# Patient Record
Sex: MALE | Race: WHITE | Employment: OTHER | URBAN - METROPOLITAN AREA
[De-identification: names, ages, dates, MRNs, and addresses within clinical notes are randomized per-mention and may not be internally consistent; named-entity substitution may affect disease eponyms.]

---

## 2017-06-28 RX ORDER — SIMVASTATIN 40 MG
TABLET ORAL
Qty: 90 TABLET | Refills: 3 | Status: SHIPPED | OUTPATIENT
Start: 2017-06-28 | End: 2018-07-03 | Stop reason: SDUPTHER

## 2017-08-11 ENCOUNTER — OFFICE VISIT (OUTPATIENT)
Dept: CARDIOLOGY CLINIC | Age: 47
End: 2017-08-11

## 2017-08-11 VITALS
DIASTOLIC BLOOD PRESSURE: 80 MMHG | HEART RATE: 70 BPM | BODY MASS INDEX: 20.05 KG/M2 | HEIGHT: 72 IN | OXYGEN SATURATION: 98 % | SYSTOLIC BLOOD PRESSURE: 130 MMHG | WEIGHT: 148 LBS

## 2017-08-11 DIAGNOSIS — E78.2 MIXED HYPERLIPIDEMIA: ICD-10-CM

## 2017-08-11 DIAGNOSIS — I73.9 PAD (PERIPHERAL ARTERY DISEASE) (HCC): ICD-10-CM

## 2017-08-11 DIAGNOSIS — I10 ESSENTIAL HYPERTENSION: ICD-10-CM

## 2017-08-11 DIAGNOSIS — I25.10 CORONARY ARTERY DISEASE INVOLVING NATIVE CORONARY ARTERY OF NATIVE HEART WITHOUT ANGINA PECTORIS: Primary | ICD-10-CM

## 2017-08-11 PROCEDURE — 99214 OFFICE O/P EST MOD 30 MIN: CPT | Performed by: INTERNAL MEDICINE

## 2017-08-11 PROCEDURE — 93000 ELECTROCARDIOGRAM COMPLETE: CPT | Performed by: INTERNAL MEDICINE

## 2017-08-11 RX ORDER — LISINOPRIL 10 MG/1
10 TABLET ORAL DAILY
Qty: 30 TABLET | Refills: 6
Start: 2017-08-11 | End: 2019-04-22

## 2017-08-11 RX ORDER — METOCLOPRAMIDE 5 MG/1
5 TABLET ORAL PRN
COMMUNITY
End: 2019-11-15 | Stop reason: CLARIF

## 2018-07-03 RX ORDER — SIMVASTATIN 40 MG
TABLET ORAL
Qty: 90 TABLET | Refills: 3 | Status: SHIPPED | OUTPATIENT
Start: 2018-07-03 | End: 2019-04-22 | Stop reason: SDUPTHER

## 2018-07-16 ENCOUNTER — OFFICE VISIT (OUTPATIENT)
Dept: CARDIOLOGY CLINIC | Age: 48
End: 2018-07-16

## 2018-07-16 VITALS
WEIGHT: 159 LBS | OXYGEN SATURATION: 94 % | HEIGHT: 72 IN | BODY MASS INDEX: 21.54 KG/M2 | DIASTOLIC BLOOD PRESSURE: 82 MMHG | SYSTOLIC BLOOD PRESSURE: 174 MMHG | HEART RATE: 84 BPM

## 2018-07-16 DIAGNOSIS — E78.2 MIXED HYPERLIPIDEMIA: ICD-10-CM

## 2018-07-16 DIAGNOSIS — I10 ESSENTIAL HYPERTENSION: ICD-10-CM

## 2018-07-16 DIAGNOSIS — I73.9 PERIPHERAL VASCULAR DISEASE (HCC): ICD-10-CM

## 2018-07-16 DIAGNOSIS — I25.10 CORONARY ARTERY DISEASE INVOLVING NATIVE CORONARY ARTERY OF NATIVE HEART WITHOUT ANGINA PECTORIS: Primary | ICD-10-CM

## 2018-07-16 PROCEDURE — 99215 OFFICE O/P EST HI 40 MIN: CPT | Performed by: INTERNAL MEDICINE

## 2018-07-16 RX ORDER — AMLODIPINE BESYLATE 5 MG/1
5 TABLET ORAL DAILY
Qty: 30 TABLET | Refills: 6 | Status: SHIPPED | OUTPATIENT
Start: 2018-07-16 | End: 2019-01-30 | Stop reason: SDUPTHER

## 2018-07-16 RX ORDER — NITROGLYCERIN 0.4 MG/1
0.4 TABLET SUBLINGUAL EVERY 5 MIN PRN
Qty: 25 TABLET | Refills: 3 | Status: SHIPPED | OUTPATIENT
Start: 2018-07-16 | End: 2019-04-22 | Stop reason: SDUPTHER

## 2018-07-16 RX ORDER — CLOPIDOGREL BISULFATE 75 MG/1
75 TABLET ORAL DAILY
Qty: 30 TABLET | Refills: 6 | Status: SHIPPED | OUTPATIENT
Start: 2018-07-16 | End: 2019-01-30 | Stop reason: SDUPTHER

## 2018-07-16 ASSESSMENT — ENCOUNTER SYMPTOMS
ABDOMINAL DISTENTION: 0
WHEEZING: 0
COUGH: 0
EYE REDNESS: 0
BLOOD IN STOOL: 0
SHORTNESS OF BREATH: 0

## 2018-07-16 NOTE — PROGRESS NOTES
Ameena Garza is a 57 y.o. male    Reason for Visit: f/u CAD  CC: \"I am having foot and leg pain\"    HPI  Ameena Garza states that he is having left foot and distal left leg pain and swelling. He is only able to walk about 100 feet due to the pain. He also has left leg pain at night when sleeping with his leg elevated. Dangling his foot over the side of the bed improves his pain. His BP has also been elevated due to the pain. He denies chest pain, palpitations, dizziness, syncope, and increased dyspnea. Review of Systems   Constitutional: Negative for activity change, appetite change, chills, fatigue, fever and unexpected weight change. HENT: Negative for congestion, nosebleeds and tinnitus. Eyes: Negative for redness and visual disturbance. Respiratory: Negative for cough, shortness of breath and wheezing. Cardiovascular: Positive for leg swelling. Negative for chest pain and palpitations. Gastrointestinal: Negative for abdominal distention and blood in stool. Genitourinary: Negative for dysuria and hematuria. Musculoskeletal: Negative for gait problem and myalgias. Leg pain   Neurological: Negative for dizziness and speech difficulty. Hematological: Does not bruise/bleed easily. Psychiatric/Behavioral: Negative for behavioral problems and confusion. All other systems reviewed and are negative. Physical Exam   Constitutional: He is oriented to person, place, and time. He appears well-developed and well-nourished. HENT:   Head: Normocephalic and atraumatic. Eyes: Conjunctivae are normal. Right eye exhibits no discharge. Left eye exhibits no discharge. Neck: Normal range of motion. Neck supple. Cardiovascular: Normal rate, regular rhythm, normal heart sounds and intact distal pulses. Pulmonary/Chest: Effort normal and breath sounds normal.   Abdominal: Soft. Bowel sounds are normal.   Musculoskeletal: Normal range of motion. He exhibits no edema.    LLE cool to touch   Neurological: He is alert and oriented to person, place, and time. Skin: Skin is warm and dry. Psychiatric: He has a normal mood and affect. His behavior is normal.   Nursing note and vitals reviewed. Wt Readings from Last 3 Encounters:   07/16/18 159 lb (72.1 kg)   08/11/17 148 lb (67.1 kg)   06/10/16 156 lb 12.8 oz (71.1 kg)     BP Readings from Last 3 Encounters:   07/16/18 (!) 174/82   08/11/17 130/80   06/10/16 130/82     Pulse Readings from Last 3 Encounters:   07/16/18 84   08/11/17 70   06/10/16 75         Current Outpatient Prescriptions:     simvastatin (ZOCOR) 40 MG tablet, TAKE 1 TABLET NIGHTLY, Disp: 90 tablet, Rfl: 3    metoclopramide (REGLAN) 5 MG tablet, Take 5 mg by mouth as needed, Disp: , Rfl:     lisinopril (PRINIVIL;ZESTRIL) 10 MG tablet, Take 1 tablet by mouth daily (Patient taking differently: Take 10 mg by mouth as needed ), Disp: 30 tablet, Rfl: 6    methocarbamol (ROBAXIN) 750 MG tablet, Take 750 mg by mouth as needed, Disp: , Rfl:     topiramate (TOPAMAX) 50 MG tablet, Take 50 mg by mouth as needed, Disp: , Rfl:     metoprolol (LOPRESSOR) 25 MG tablet, Take 1 tablet by mouth 2 times daily, Disp: 180 tablet, Rfl: 3    buPROPion (WELLBUTRIN XL) 300 MG XL tablet, Take 300 mg by mouth every morning, Disp: , Rfl:     traZODone (DESYREL) 100 MG tablet, Take 100 mg by mouth as needed. , Disp: , Rfl:     aspirin 325 MG tablet, Take 325 mg by mouth daily. , Disp: , Rfl:     Past Medical History:   Diagnosis Date    Acute MI     inferior wall    Alcohol abuse     history     [facility]     CAD (coronary artery disease)     S/P IWMIi 4/10 w/ RCA FIONA X3, normal LVEF. Cath 11/10 nonobstructive LAD dz, RCA stent with moderate instent restenosis.  s/p OM fiona X2 12/10    Closed fracture of cervical spine ([facility]     2000; C2-3; fell off ladder    Depression     not on meds    GERD (gastroesophageal reflux disease)     managed by PCP    Hyperlipidemia     rec semi annual lipids with LDL goal <70    Hypertension     controlled    Nausea & vomiting     with anesthesia    PAD (peripheral artery disease) (HCC)     Aortogram 90% SFA followed by Dr. Saige Kunz user     cessation discussed; past 25 years; 0.5 ppd       Social History     Social History    Marital status:      Spouse name: N/A    Number of children: N/A    Years of education: N/A     Social History Main Topics    Smoking status: Former Smoker     Packs/day: 0.25     Years: 25.00     Quit date: 3/5/2014    Smokeless tobacco: Never Used      Comment: smokes a couple times a week    Alcohol use 21.0 oz/week     35 Cans of beer per week      Comment: 3 - 5 beers per day @ least.    Drug use: No    Sexual activity: Yes     Partners: Female     Other Topics Concern    None     Social History Narrative    None       Past Surgical History:   Procedure Laterality Date    CORONARY ANGIOPLASTY WITH STENT PLACEMENT  jan 2011    stent x 1    CORONARY ANGIOPLASTY WITH STENT PLACEMENT      IR FEMORAL POPLITEAL BYPASS GRAFT  7/21/11    Left    LEG SURGERY      stent  x 2; vascular dz       Family History   Problem Relation Age of Onset    Heart Disease Mother     Diabetes Father     High Blood Pressure Father     High Blood Pressure Brother     High Blood Pressure Paternal Uncle     Heart Disease Paternal Uncle     Stroke Paternal Uncle     Diabetes Paternal Uncle        No Known Allergies    Recent Labs and Imaging reviewed    Assessment        CAD (coronary artery disease)      S/P IWMI 4/10 w/ RCA SISSY X3, normal LVEF. Cath 11/10 nonobstructive LAD dz, RCA stent with moderate instent restenosis. s/p OM SISSY X2 12/10. Cardiac cath 3/2014 : revealed severe left main trunk stenosis sent immediately for CABGx3 on  3/11/2014 LIMA-LAD, SVG-OM, SVG-PDA    HYPERTENSION      uncontrolled. Taking ACE prn only due to hyperkalemia.      Hyperlipidemia       On statin, unable to tolerate high

## 2018-07-16 NOTE — LETTER
for management of PAD. Ongoing risk factor modification also discussed. Fasting lipid profile, CMP soon and prior to next visit. If you have questions, please do not hesitate to call me. I look forward to following Yani Bejarano along with you.     Sincerely,        Emiliana Levi MD

## 2018-07-19 NOTE — COMMUNICATION BODY
Assessment:       Plan:     HPI  Edwin Hamm states that he is having left foot and distal left leg pain and swelling. He is only able to walk about 100 feet due to the pain. He also has left leg pain at night when sleeping with his leg elevated. Dangling his foot over the side of the bed improves his pain. His BP has also been elevated due to the pain. He denies chest pain, palpitations, dizziness, syncope, and increased dyspnea. Assessment        CAD (coronary artery disease)      S/P IWMI 4/10 w/ RCA SISSY X3, normal LVEF. Cath 11/10 nonobstructive LAD dz, RCA stent with moderate instent restenosis. s/p OM SISSY X2 12/10. Cardiac cath 3/2014 : revealed severe left main trunk stenosis sent immediately for CABGx3 on  3/11/2014 LIMA-LAD, SVG-OM, SVG-PDA    HYPERTENSION      uncontrolled. Taking ACE prn only due to hyperkalemia.  Hyperlipidemia       On statin, unable to tolerate high intensity statin. LFT's elevated in past. No recent lipids.  PAD (peripheral artery disease) [place] dz      Aortogram 90% L SFA s/p stent. s/p vascular surgery left leg 7/11 Dr. Ddoie Bui     ETOH abuse in past       avoidance discussed    Tobacco user      Encouraged continued cessation discussed - quit smoking 2014     GERD (gastroesophageal reflux disease)      managed by PCP     Plan   No evidence of CHF. No angina. In view of uncontrolled HTN, will start norvasc 5mg daily. Continue ASA, statin and BB. In view of PAD, will start plavix 75mg daily. Recommend arterial doppler studies of lower extremities to evaluate claudication/progression of disease. Will refer to Dr. Ambika Short for management of PAD. Ongoing risk factor modification also discussed. Fasting lipid profile, CMP soon and prior to next visit.

## 2018-07-20 ENCOUNTER — OFFICE VISIT (OUTPATIENT)
Dept: SURGERY | Age: 48
End: 2018-07-20

## 2018-07-20 VITALS
HEIGHT: 72 IN | SYSTOLIC BLOOD PRESSURE: 164 MMHG | DIASTOLIC BLOOD PRESSURE: 80 MMHG | WEIGHT: 159 LBS | BODY MASS INDEX: 21.54 KG/M2

## 2018-07-20 DIAGNOSIS — I25.10 CORONARY ARTERY DISEASE INVOLVING NATIVE CORONARY ARTERY OF NATIVE HEART WITHOUT ANGINA PECTORIS: ICD-10-CM

## 2018-07-20 DIAGNOSIS — E78.2 MIXED HYPERLIPIDEMIA: ICD-10-CM

## 2018-07-20 DIAGNOSIS — I77.9 PAOD (PERIPHERAL ARTERIAL OCCLUSIVE DISEASE) (HCC): ICD-10-CM

## 2018-07-20 DIAGNOSIS — I10 ESSENTIAL HYPERTENSION: ICD-10-CM

## 2018-07-20 DIAGNOSIS — T82.868A THROMBOSIS OF FEMORO-POPLITEAL BYPASS GRAFT, INITIAL ENCOUNTER (HCC): Primary | ICD-10-CM

## 2018-07-20 DIAGNOSIS — I70.322 ATHEROSCLEROSIS OF BYPASS GRAFT OF LEFT LOWER EXTREMITY WITH REST PAIN (HCC): ICD-10-CM

## 2018-07-20 PROCEDURE — 99205 OFFICE O/P NEW HI 60 MIN: CPT | Performed by: SURGERY

## 2018-07-20 RX ORDER — OXYCODONE HYDROCHLORIDE AND ACETAMINOPHEN 5; 325 MG/1; MG/1
1 TABLET ORAL EVERY 6 HOURS PRN
Qty: 28 TABLET | Refills: 0 | Status: SHIPPED | OUTPATIENT
Start: 2018-07-20 | End: 2018-07-31 | Stop reason: SDUPTHER

## 2018-07-20 ASSESSMENT — ENCOUNTER SYMPTOMS
CHEST TIGHTNESS: 0
SHORTNESS OF BREATH: 0
ABDOMINAL PAIN: 0

## 2018-07-20 NOTE — Clinical Note
I saw Colby Allen today. He has an acutely ischemic left foot with thrombosis of a bypass graft that was constructed in 2011. Arrangements are being made for him to undergo an angiogram and if possible endovascular correction of the blood flow issues to the left leg and foot.   Will keep you updated Mariangel Abreu

## 2018-07-20 NOTE — PATIENT INSTRUCTIONS
The pain and numbness of the left leg and foot is from arterial bypass clotting and blocked arteries. Keep left foot dependent and down  Keep it wrapped in blanket or sock to keep it warm  Do not worry about the swelling in the foot and ankle  You will be scheduled for an angiogram at [facility] on Tuesday 7/24/18.   Stand by for notification of when to show up

## 2018-07-23 ENCOUNTER — TELEPHONE (OUTPATIENT)
Dept: SURGERY | Age: 48
End: 2018-07-23

## 2018-07-26 PROCEDURE — APPNB15 APP NON BILLABLE TIME 0-15 MINS: Performed by: NURSE PRACTITIONER

## 2018-07-27 ENCOUNTER — HOSPITAL ENCOUNTER (OUTPATIENT)
Dept: CARDIAC CATH/INVASIVE PROCEDURES | Age: 48
Discharge: HOME | End: 2018-07-27
Attending: SURGERY | Admitting: SURGERY

## 2018-07-27 VITALS — WEIGHT: 157.19 LBS | HEIGHT: 72 IN | BODY MASS INDEX: 21.29 KG/M2

## 2018-07-27 DIAGNOSIS — I73.9 PERIPHERAL VASCULAR DISEASE (HCC): Primary | ICD-10-CM

## 2018-07-27 LAB
ANION GAP SERPL CALCULATED.3IONS-SCNC: 12 MMOL/L (ref 3–16)
BUN BLDV-MCNC: 11 MG/DL (ref 7–20)
CALCIUM SERPL-MCNC: 9.2 MG/DL (ref 8.3–10.6)
CHLORIDE BLD-SCNC: 91 MMOL/L (ref 99–110)
CO2: 24 MMOL/L (ref 21–32)
CREAT SERPL-MCNC: 1 MG/DL (ref 0.9–1.3)
GFR AFRICAN AMERICAN: >60
GFR NON-AFRICAN AMERICAN: >60
GLUCOSE BLD-MCNC: 93 MG/DL (ref 70–99)
HCT VFR BLD CALC: 40.1 % (ref 40.5–52.5)
HEMOGLOBIN: 14 G/DL (ref 13.5–17.5)
MCH RBC QN AUTO: 34.8 PG (ref 26–34)
MCHC RBC AUTO-ENTMCNC: 35 G/DL (ref 31–36)
MCV RBC AUTO: 99.6 FL (ref 80–100)
PDW BLD-RTO: 13 % (ref 12.4–15.4)
PLATELET # BLD: 214 K/UL (ref 135–450)
PMV BLD AUTO: 6.4 FL (ref 5–10.5)
POTASSIUM SERPL-SCNC: 4.2 MMOL/L (ref 3.5–5.1)
RBC # BLD: 4.03 M/UL (ref 4.2–5.9)
SODIUM BLD-SCNC: 127 MMOL/L (ref 136–145)
WBC # BLD: 7.2 K/UL (ref 4–11)

## 2018-07-27 PROCEDURE — 75774 ARTERY X-RAY EACH VESSEL: CPT | Performed by: SURGERY

## 2018-07-27 PROCEDURE — 76937 US GUIDE VASCULAR ACCESS: CPT | Performed by: SURGERY

## 2018-07-27 PROCEDURE — 37221 PR REVSC OPN/PRQ ILIAC ART W/STNT PLMT & ANGIOPLSTY: CPT | Performed by: SURGERY

## 2018-07-27 PROCEDURE — 75625 CONTRAST EXAM ABDOMINL AORTA: CPT | Performed by: SURGERY

## 2018-07-27 PROCEDURE — 37184 PRIM ART M-THRMBC 1ST VSL: CPT | Performed by: SURGERY

## 2018-07-27 PROCEDURE — 75716 ARTERY X-RAYS ARMS/LEGS: CPT | Performed by: SURGERY

## 2018-07-27 PROCEDURE — 37227 PR REVSC OPN/PRQ FEM/POP W/STNT/ATHRC/ANGIOP SM VSL: CPT | Performed by: SURGERY

## 2018-07-27 PROCEDURE — 37228 PR REVSC OPN/PRQ TIB/PERO W/ANGIOPLASTY UNI: CPT | Performed by: SURGERY

## 2018-07-27 RX ORDER — 0.9 % SODIUM CHLORIDE 0.9 %
500 INTRAVENOUS SOLUTION INTRAVENOUS PRN
Status: DISCONTINUED | OUTPATIENT
Start: 2018-07-27 | End: 2018-07-28 | Stop reason: HOSPADM

## 2018-07-27 RX ORDER — ACETAMINOPHEN 325 MG/1
650 TABLET ORAL EVERY 4 HOURS PRN
Status: DISCONTINUED | OUTPATIENT
Start: 2018-07-27 | End: 2018-07-28 | Stop reason: HOSPADM

## 2018-07-27 RX ORDER — FENTANYL CITRATE 50 UG/ML
25 INJECTION, SOLUTION INTRAMUSCULAR; INTRAVENOUS
Status: ACTIVE | OUTPATIENT
Start: 2018-07-27 | End: 2018-07-27

## 2018-07-27 RX ORDER — SODIUM CHLORIDE 9 MG/ML
INJECTION, SOLUTION INTRAVENOUS CONTINUOUS
Status: DISCONTINUED | OUTPATIENT
Start: 2018-07-27 | End: 2018-07-28 | Stop reason: HOSPADM

## 2018-07-27 RX ORDER — SODIUM CHLORIDE 0.9 % (FLUSH) 0.9 %
10 SYRINGE (ML) INJECTION PRN
Status: DISCONTINUED | OUTPATIENT
Start: 2018-07-27 | End: 2018-07-27 | Stop reason: ALTCHOICE

## 2018-07-27 RX ORDER — SODIUM CHLORIDE 0.9 % (FLUSH) 0.9 %
10 SYRINGE (ML) INJECTION EVERY 12 HOURS SCHEDULED
Status: DISCONTINUED | OUTPATIENT
Start: 2018-07-27 | End: 2018-07-27 | Stop reason: ALTCHOICE

## 2018-07-27 RX ORDER — SODIUM CHLORIDE 9 MG/ML
INJECTION, SOLUTION INTRAVENOUS CONTINUOUS
Status: DISCONTINUED | OUTPATIENT
Start: 2018-07-27 | End: 2018-07-27 | Stop reason: ALTCHOICE

## 2018-07-27 RX ORDER — ALPRAZOLAM 0.25 MG/1
0.5 TABLET ORAL
Status: DISCONTINUED | OUTPATIENT
Start: 2018-07-27 | End: 2018-07-27 | Stop reason: ALTCHOICE

## 2018-07-27 RX ORDER — ONDANSETRON 2 MG/ML
4 INJECTION INTRAMUSCULAR; INTRAVENOUS EVERY 6 HOURS PRN
Status: DISCONTINUED | OUTPATIENT
Start: 2018-07-27 | End: 2018-07-28 | Stop reason: HOSPADM

## 2018-07-27 RX ORDER — MORPHINE SULFATE 10 MG/ML
2 INJECTION, SOLUTION INTRAMUSCULAR; INTRAVENOUS
Status: ACTIVE | OUTPATIENT
Start: 2018-07-27 | End: 2018-07-27

## 2018-07-27 NOTE — PROGRESS NOTES
disease      Assessment:     Patient Active Problem List   Diagnosis    CAD (coronary artery disease)    HTN (hypertension)    Hyperlipidemia    Peripheral vascular disease ([facility]    Mechanical complication of other vascular device, implant, and graft    Chest pain    Dyspnea       Arterial Exam Lower Extremity:  pedal pulses decreased or absent bilateral    Location Target Vessel Side: left    Vascular Exam of Planned Intervention: CLI - Ischemic Pain at Rest (Jhonny 4), Pt reported max distance: <200 meters (2 blocks), CLI Rest Pain: Yes, CLI Tissue Loss Location: Forefoot and Toes       Aneurysm below the Aortic Bifurcation: Aneurysm: No    Plan:     Planned procedure Angiogram of the aorta with possible revascularization. Written Patient Dismissal Instructions Given    The patient verbalized understanding of the risks associated with Peripheral Vascular Intervention and has agreed to the above plan.         Electronically signed by Mick Manzo MD on 7/27/2018 at 7:54 AM

## 2018-07-27 NOTE — H&P
Patient ID: Yesenia Card is a 57 y.o. male. Chief Complaint   Patient presents with    New Patient       Pt is here today, referred by Dr Jenelle Davidson, leg swelling.       HPI He has a painful, swelling left leg. In 2011 he had a left leg fem pop bypass graft using vein from the left leg. About one month ago he started having ambulatory discomfort in the left calf and also had radiation of pain into the left shin area. Over the past week the pain is much worse and runs along the left shin and ankle region and into the foot  There is numbness of the left foot but this has been present since the bypass graft in 2011 ( [address] Vikram Le)  He describes typical ischemic rest pain requiring him to hang the left leg over the bed to get some sleep at night. He has been requested to have peripheral vascular studies done and these have not yet been scheduled. He is getting desperate and is seeking for more urgent evaluation  In the office today he is accompanied by his wife, daughter, and a friend. Being treated for hypertension, hyperlipidemia, coronary artery disease  Stopped smoking cigarettes 4 years ago.     Review of Systems   Constitutional: Positive for activity change. Respiratory: Negative for chest tightness and shortness of breath. Cardiovascular: Positive for leg swelling (left foot and ankle). Negative for chest pain. Gastrointestinal: Negative for abdominal pain. Musculoskeletal:        Leg spasms; restless legs   Neurological: Positive for headaches (history of cluster headaches). Psychiatric/Behavioral: Positive for agitation (anxiety).       Objective:   Physical Exam   Constitutional: He appears well-developed and well-nourished. HENT:   Head: Normocephalic. Eyes: Pupils are equal, round, and reactive to light. Neck: Normal range of motion. Neck supple. Carotid bruit is not present. Cardiovascular: Normal rate, regular rhythm and normal heart sounds.     Pulses:       Carotid pulses are 2+ on the right side, and 2+ on the left side. Radial pulses are 2+ on the right side, and 2+ on the left side. Femoral pulses are 1+ on the right side, and 1+ on the left side with bruit. Popliteal pulses are 0 on the right side, and 0 on the left side. Dorsalis pedis pulses are 0 on the right side, and 0 on the left side. Posterior tibial pulses are 0 on the right side, and 0 on the left side. Doppler signals in right DP and PT with waveforms obtained  Very faint monophasic DP doppler signal in left foot  Left PT signal not audible  Swelling and dependent rubor to left foot and ankle  Skin remains intact  Varicose veins at the left ankle region   Pulmonary/Chest: Effort normal and breath sounds normal. No respiratory distress. He has no wheezes. He has no rales. Abdominal: He exhibits no distension and no mass. There is no tenderness. There is no guarding. Musculoskeletal: He exhibits no edema or tenderness. Neurological: No cranial nerve deficit or sensory deficit. Coordination and gait normal.   Skin: Skin is warm. Vitals reviewed.        Assessment:     Diagnosis Orders   1. Thrombosis of femoro-popliteal bypass graft, initial encounter ([facility]  oxyCODONE-acetaminophen (PERCOCET) 5-325 MG per tablet   2. Atherosclerosis of bypass graft of left lower extremity with rest pain (HCC)  oxyCODONE-acetaminophen (PERCOCET) 5-325 MG per tablet   3. PAOD (peripheral arterial occlusive disease) (HCC)      4. Essential hypertension      5. Mixed hyperlipidemia      6. Coronary artery disease involving native coronary artery of native heart without angina pectoris                     Discussed with Dr Sol Lewis about diagnostic and therapeutic angiogram.  Exploring possibility of intervening on Tuesday 7/24/2018  Plan:   The pain and numbness of the left leg and foot is from arterial bypass clotting and blocked arteries.   Keep left foot dependent and down  Keep it wrapped in blanket or sock to keep it warm  Do not worry about the swelling in the foot and ankle  You will be scheduled for an angiogram at [facility] on Tuesday 7/24/18.   Stand by for notification of when to show up  Rx for Percocet #28

## 2018-07-27 NOTE — PRE SEDATION
metoprolol (LOPRESSOR) 25 MG tablet Take 1 tablet by mouth 2 times daily 6/10/16  Yes Anh Pineda MD   buPROPion (WELLBUTRIN XL) 300 MG XL tablet Take 300 mg by mouth every morning   Yes Historical Provider, MD   aspirin 325 MG tablet Take 325 mg by mouth daily. Yes Historical Provider, MD   nitroGLYCERIN (NITROSTAT) 0.4 MG SL tablet Place 1 tablet under the tongue every 5 minutes as needed for Chest pain 7/16/18   Anh Pineda MD   metoclopramide (REGLAN) 5 MG tablet Take 5 mg by mouth as needed    Historical Provider, MD   lisinopril (PRINIVIL;ZESTRIL) 10 MG tablet Take 1 tablet by mouth daily  Patient taking differently: Take 10 mg by mouth as needed  8/11/17   Anh Pineda MD   methocarbamol (ROBAXIN) 750 MG tablet Take 750 mg by mouth as needed    Historical Provider, MD   topiramate (TOPAMAX) 50 MG tablet Take 50 mg by mouth as needed    Historical Provider, MD   traZODone (DESYREL) 100 MG tablet Take 100 mg by mouth as needed. Historical Provider, MD         Pre-Sedation Documentation and Exam:   I have personally completed a history, physical exam & review of systems for this patient (see notes).     Mallampati Airway Assessment:  normal    Prior History of Anesthesia Complications:   none    ASA Classification:  Class 3 - A patient with severe systemic disease that limits activity but is not incapacitating    Sedation/ Anesthesia Plan:   intravenous sedation    Medications Planned:   midazolam (Versed) intravenously and fentanyl intravenously    Patient is an appropriate candidate for plan of sedation: yes    Electronically signed by Russell Fitzpatrick MD on 7/27/2018 at 7:53 AM

## 2018-07-28 NOTE — PROCEDURES
[address] [place] [address]                                  PROCEDURE NOTE    PATIENT NAME: Jarad Renteria                   :        1970  MED REC NO:   [id number]                          ROOM:  ACCOUNT NO:   [de-identified]                          ADMIT DATE: 2018  PROVIDER:     Jorge Caro MD    ANGIOGRAPHY    DATE OF PROCEDURE:  2018    PREPROCEDURE DIAGNOSES:  1. Lower extremity atherosclerosis with rest pain of the left foot. 2.  Peripheral arterial disease. 3.  Arterial occlusion, left lower extremity. POSTPROCEDURE DIAGNOSES:  1. Lower extremity atherosclerosis with rest pain of the left foot. 2.  Peripheral arterial disease. 3.  Arterial occlusion, left lower extremity. PROCEDURES PERFORMED:  1. Ultrasound-guided access to the right common femoral artery. 2.  Abdominal aortogram.  3.  Angiogram of the bilateral lower extremities. 4.  Selective angiography of the left femoral artery bifurcation. 5.  Selective angiography of the left posterior tibial artery. 6.  Stent placement, left external iliac artery. 7.  Primary percutaneous mechanical arterial thrombectomy, left superficial  femoral and popliteal arteries. 8.  Laser atherectomy with stent placement, left superficial femoral and  popliteal arteries. 9.  Angioplasty of the left posterior tibial artery. SURGEON:  Jorge Caro MD    ANESTHESIA:  Local with IV sedation. INDICATIONS:  The patient is a 70-year-old male with severe peripheral  arterial disease and critical limb ischemia with ischemic rest pain of the  left foot. Angiography is indicated to determine if he is a candidate for  revascularization. Endovascular revascularization is indicated to provide  limb preservation and alleviate his symptoms of debilitating peripheral  artery disease.   He is aware of the risks, benefits, and alternatives of  the This was followed by laser atherectomy with a 2-mm  atherectomy device. A balloon angioplasty of the posterior tibial artery was performed with a 3 mm angioplasty balloon. Pre-angioplasty stenosis was 70% and post angioplasty stenosis was 0%. Post-laser atherectomy, plain balloon angioplasty was performed with a 5-mm  angioplasty balloon. A flow-limiting dissection was noted in the proximal  superficial femoral artery and this was stented with a 7 mm x 12 cm Zilver  PTX stent. Residual stenosis of greater than 50% was noted at the P2  segment of the popliteal artery. Balloon angioplasty was performed at this  location and this was followed by stenting of the popliteal artery with a  5.5 mm x 12 cm Supera stent. Completion angiogram revealed zero residual  stenosis. Mild extravasation of the popliteal artery was noted from  localized perforation. This was treated by giving the patient protamine  and prolonged balloon inflation of the popliteal artery. Completion  angiogram revealed minimal extravasation. Manual compression of the  popliteal fossa revealed no swelling and manual compression was used to  gain extra hemostasis. The pedal access sheath and the right groin sheath were removed. [employer]  technique was used to gain hemostasis at the right groin. The patient's  left foot had returned to normal color and had detectable Doppler signals. The Doppler signals on the left were not detectable preoperatively. FINDINGS:    Aorta: The celiac and superior mesenteric arteries are widely patent. The  right renal artery has a 90% ostial stenosis. The left renal artery is  widely patent. The infrarenal abdominal aorta is normal appearing. Right Lower Extremity:  The right common and internal iliac arteries are  patent. The external iliac artery has a 70% stenosis throughout. The  common and deep femoral arteries are patent.   The superficial femoral  artery has calcified 90% stenosis in the distal thigh. There is a stent  present in the mid thigh, which is patent, but has a 50% recurrent  stenosis. There is a stent in the popliteal artery, which is patent, but  with a residual 50% stenosis. There is diffuse tibial artery level  disease. Left Lower Extremity:  The left common and internal iliac arteries are  normal.  The external iliac artery has a 90% stenosis. The common and deep  femoral arteries are patent. The left femoral-popliteal bypass graft is  occluded. The native superficial femoral and popliteal arteries are  occluded. The runoff to the foot is via the posterior tibial and anterior  tibial arteries. IMPRESSION:  Good angiographic result post restoration of patency of the  native left superficial femoral and popliteal arteries.         Cat Francis MD    D: 07/27/2018 10:42:09       T: 07/27/2018 10:44:50     [email or web address]  Job#: [id number]     Doc#: [id number]    CC:

## 2018-07-30 ENCOUNTER — TELEPHONE (OUTPATIENT)
Dept: SURGERY | Age: 48
End: 2018-07-30

## 2018-07-31 ENCOUNTER — TELEPHONE (OUTPATIENT)
Dept: SURGERY | Age: 48
End: 2018-07-31

## 2018-07-31 DIAGNOSIS — I70.322 ATHEROSCLEROSIS OF BYPASS GRAFT OF LEFT LOWER EXTREMITY WITH REST PAIN (HCC): ICD-10-CM

## 2018-07-31 DIAGNOSIS — Z98.62 STATUS POST PERIPHERAL ARTERY ANGIOPLASTY: ICD-10-CM

## 2018-07-31 DIAGNOSIS — G89.18 PAIN FOLLOWING SURGERY OR PROCEDURE: Primary | ICD-10-CM

## 2018-07-31 DIAGNOSIS — T82.868A THROMBOSIS OF FEMORO-POPLITEAL BYPASS GRAFT, INITIAL ENCOUNTER (HCC): ICD-10-CM

## 2018-07-31 RX ORDER — OXYCODONE HYDROCHLORIDE AND ACETAMINOPHEN 5; 325 MG/1; MG/1
1 TABLET ORAL EVERY 6 HOURS PRN
Qty: 28 TABLET | Refills: 0 | Status: SHIPPED | OUTPATIENT
Start: 2018-07-31 | End: 2018-08-07

## 2018-08-13 ENCOUNTER — OFFICE VISIT (OUTPATIENT)
Dept: SURGERY | Age: 48
End: 2018-08-13

## 2018-08-13 VITALS
HEIGHT: 73 IN | RESPIRATION RATE: 16 BRPM | OXYGEN SATURATION: 98 % | BODY MASS INDEX: 20.46 KG/M2 | HEART RATE: 72 BPM | WEIGHT: 154.4 LBS | SYSTOLIC BLOOD PRESSURE: 168 MMHG | DIASTOLIC BLOOD PRESSURE: 84 MMHG

## 2018-08-13 DIAGNOSIS — E78.2 MIXED HYPERLIPIDEMIA: ICD-10-CM

## 2018-08-13 DIAGNOSIS — Z98.62 STATUS POST PERIPHERAL ARTERY ANGIOPLASTY: ICD-10-CM

## 2018-08-13 DIAGNOSIS — I73.9 PERIPHERAL VASCULAR DISEASE (HCC): Primary | ICD-10-CM

## 2018-08-13 DIAGNOSIS — I10 ESSENTIAL HYPERTENSION: ICD-10-CM

## 2018-08-13 DIAGNOSIS — G89.18 PAIN FOLLOWING SURGERY OR PROCEDURE: ICD-10-CM

## 2018-08-13 LAB
POC ACT LR: 338 SEC
POC ACT LR: >400 SEC
POC ACT LR: >400 SEC

## 2018-08-13 PROCEDURE — 99213 OFFICE O/P EST LOW 20 MIN: CPT | Performed by: SURGERY

## 2018-08-13 NOTE — PROGRESS NOTES
Musculoskeletal: He exhibits no edema or tenderness. Left lower leg: He exhibits swelling. Left foot: There is swelling. Neurological: A sensory deficit (hypersensitive from revascularization effect) is present. No cranial nerve deficit. Coordination and gait normal.   Skin: Skin is warm. Vitals reviewed. Assessment:       Diagnosis Orders   1. Peripheral vascular disease ([facility] [address]     2. Status post peripheral artery angioplasty     3. Pain following surgery or procedure     4. Essential hypertension     5. Mixed hyperlipidemia             Plan:      Left leg looks excellent  Swelling and shooting pains will resolve  Try wrapping the left leg with ACE bandage from forefoot to the knee to help to reduce the swelling  Remove wrap at night during sleep  Ok to try skin ointments to reduce discomfort but if they do not work, stop using them   Consider Aspercreme for pain reduction  Follow up visit in 1 month when we can discuss treatment on the right leg arteries.         Dino Samson MD

## 2018-09-17 ENCOUNTER — OFFICE VISIT (OUTPATIENT)
Dept: SURGERY | Age: 48
End: 2018-09-17

## 2018-09-17 VITALS
DIASTOLIC BLOOD PRESSURE: 91 MMHG | WEIGHT: 157 LBS | HEART RATE: 74 BPM | BODY MASS INDEX: 20.71 KG/M2 | SYSTOLIC BLOOD PRESSURE: 172 MMHG

## 2018-09-17 DIAGNOSIS — Z98.62 STATUS POST PERIPHERAL ARTERY ANGIOPLASTY: Primary | ICD-10-CM

## 2018-09-17 DIAGNOSIS — E78.2 MIXED HYPERLIPIDEMIA: ICD-10-CM

## 2018-09-17 DIAGNOSIS — I25.10 CORONARY ARTERY DISEASE INVOLVING NATIVE CORONARY ARTERY OF NATIVE HEART WITHOUT ANGINA PECTORIS: ICD-10-CM

## 2018-09-17 DIAGNOSIS — I10 ESSENTIAL HYPERTENSION: ICD-10-CM

## 2018-09-17 DIAGNOSIS — I73.9 PERIPHERAL VASCULAR DISEASE (HCC): ICD-10-CM

## 2018-09-17 PROCEDURE — 99213 OFFICE O/P EST LOW 20 MIN: CPT | Performed by: SURGERY

## 2018-09-18 ENCOUNTER — TELEPHONE (OUTPATIENT)
Dept: SURGERY | Age: 48
End: 2018-09-18

## 2018-09-18 DIAGNOSIS — I73.9 PAD (PERIPHERAL ARTERY DISEASE) (HCC): Primary | ICD-10-CM

## 2018-09-18 DIAGNOSIS — Z98.62 STATUS POST PERIPHERAL ARTERY ANGIOPLASTY: ICD-10-CM

## 2018-09-18 NOTE — TELEPHONE ENCOUNTER
Called spoke with patient and set up angiogram and advised patient to come have his blood drawn as well before the procedure.  Orders placed in epic

## 2018-09-21 ENCOUNTER — TELEPHONE (OUTPATIENT)
Dept: SURGERY | Age: 48
End: 2018-09-21

## 2018-10-08 PROCEDURE — APPNB30 APP NON BILLABLE TIME 0-30 MINS: Performed by: NURSE PRACTITIONER

## 2018-10-09 ENCOUNTER — HOSPITAL ENCOUNTER (OUTPATIENT)
Dept: CARDIAC CATH/INVASIVE PROCEDURES | Age: 48
Discharge: HOME | End: 2018-10-09
Payer: COMMERCIAL

## 2018-10-09 VITALS — BODY MASS INDEX: 20.93 KG/M2 | WEIGHT: 154.5 LBS | HEIGHT: 72 IN

## 2018-10-09 DIAGNOSIS — I73.9 PAD (PERIPHERAL ARTERY DISEASE) (HCC): Primary | ICD-10-CM

## 2018-10-09 LAB
CALCIUM IONIZED: 1.14 MMOL/L (ref 1.12–1.32)
GFR AFRICAN AMERICAN: >60
GFR NON-AFRICAN AMERICAN: >60
GLUCOSE BLD-MCNC: 105 MG/DL (ref 70–99)
HCT VFR BLD CALC: 41.7 % (ref 40.5–52.5)
HEMOGLOBIN: 14.4 G/DL (ref 13.5–17.5)
MCH RBC QN AUTO: 34.7 PG (ref 26–34)
MCHC RBC AUTO-ENTMCNC: 34.5 G/DL (ref 31–36)
MCV RBC AUTO: 100.4 FL (ref 80–100)
PDW BLD-RTO: 13 % (ref 12.4–15.4)
PERFORMED ON: ABNORMAL
PLATELET # BLD: 193 K/UL (ref 135–450)
PMV BLD AUTO: 6.3 FL (ref 5–10.5)
POC ACT LR: 336 SEC
POC CHLORIDE: 100 MMOL/L (ref 99–110)
POC CREATININE: 1 MG/DL (ref 0.9–1.3)
POC POTASSIUM: 4.3 MMOL/L (ref 3.5–5.1)
POC SAMPLE TYPE: ABNORMAL
POC SODIUM: 136 MMOL/L (ref 136–145)
RBC # BLD: 4.15 M/UL (ref 4.2–5.9)
WBC # BLD: 7.5 K/UL (ref 4–11)

## 2018-10-09 PROCEDURE — 82947 ASSAY GLUCOSE BLOOD QUANT: CPT

## 2018-10-09 PROCEDURE — 2709999900 HC NON-CHARGEABLE SUPPLY

## 2018-10-09 PROCEDURE — C1769 GUIDE WIRE: HCPCS

## 2018-10-09 PROCEDURE — 85027 COMPLETE CBC AUTOMATED: CPT

## 2018-10-09 PROCEDURE — 2780000010 HC IMPLANT OTHER

## 2018-10-09 PROCEDURE — 75710 ARTERY X-RAYS ARM/LEG: CPT | Performed by: SURGERY

## 2018-10-09 PROCEDURE — C1724 CATH, TRANS ATHEREC,ROTATION: HCPCS

## 2018-10-09 PROCEDURE — 37227 HC FEM/POPL REVASC STNT & ATHER: CPT | Performed by: SURGERY

## 2018-10-09 PROCEDURE — 99153 MOD SED SAME PHYS/QHP EA: CPT | Performed by: SURGERY

## 2018-10-09 PROCEDURE — 76937 US GUIDE VASCULAR ACCESS: CPT | Performed by: SURGERY

## 2018-10-09 PROCEDURE — 99152 MOD SED SAME PHYS/QHP 5/>YRS: CPT | Performed by: SURGERY

## 2018-10-09 PROCEDURE — C1887 CATHETER, GUIDING: HCPCS

## 2018-10-09 PROCEDURE — 84132 ASSAY OF SERUM POTASSIUM: CPT

## 2018-10-09 PROCEDURE — 82330 ASSAY OF CALCIUM: CPT

## 2018-10-09 PROCEDURE — C2623 CATH, TRANSLUMIN, DRUG-COAT: HCPCS

## 2018-10-09 PROCEDURE — 37229 HC TIB PER TERRITORY ATHER: CPT | Performed by: SURGERY

## 2018-10-09 PROCEDURE — C1874 STENT, COATED/COV W/DEL SYS: HCPCS

## 2018-10-09 PROCEDURE — 85347 COAGULATION TIME ACTIVATED: CPT

## 2018-10-09 PROCEDURE — 37227 PR REVSC OPN/PRQ FEM/POP W/STNT/ATHRC/ANGIOP SM VSL: CPT | Performed by: SURGERY

## 2018-10-09 PROCEDURE — 75774 ARTERY X-RAY EACH VESSEL: CPT | Performed by: SURGERY

## 2018-10-09 PROCEDURE — 82565 ASSAY OF CREATININE: CPT

## 2018-10-09 PROCEDURE — 37221 PR REVSC OPN/PRQ ILIAC ART W/STNT PLMT & ANGIOPLSTY: CPT | Performed by: SURGERY

## 2018-10-09 PROCEDURE — 37221 HC ILIAC TERRITORY PLASTY STENT: CPT | Performed by: SURGERY

## 2018-10-09 PROCEDURE — 82435 ASSAY OF BLOOD CHLORIDE: CPT

## 2018-10-09 PROCEDURE — 6360000004 HC RX CONTRAST MEDICATION: Performed by: SURGERY

## 2018-10-09 PROCEDURE — C1894 INTRO/SHEATH, NON-LASER: HCPCS

## 2018-10-09 PROCEDURE — 84295 ASSAY OF SERUM SODIUM: CPT

## 2018-10-09 PROCEDURE — C1725 CATH, TRANSLUMIN NON-LASER: HCPCS

## 2018-10-09 PROCEDURE — 37229 PR REVSC OPN/PRQ TIB/PERO W/ATHRC/ANGIOP SM VSL: CPT | Performed by: SURGERY

## 2018-10-09 RX ORDER — ALPRAZOLAM 0.5 MG/1
0.5 TABLET ORAL
Status: DISCONTINUED | OUTPATIENT
Start: 2018-10-09 | End: 2018-10-09 | Stop reason: ALTCHOICE

## 2018-10-09 RX ORDER — 0.9 % SODIUM CHLORIDE 0.9 %
500 INTRAVENOUS SOLUTION INTRAVENOUS PRN
Status: DISCONTINUED | OUTPATIENT
Start: 2018-10-09 | End: 2018-10-10 | Stop reason: HOSPADM

## 2018-10-09 RX ORDER — ONDANSETRON 2 MG/ML
4 INJECTION INTRAMUSCULAR; INTRAVENOUS EVERY 6 HOURS PRN
Status: DISCONTINUED | OUTPATIENT
Start: 2018-10-09 | End: 2018-10-10 | Stop reason: HOSPADM

## 2018-10-09 RX ORDER — ACETAMINOPHEN 325 MG/1
650 TABLET ORAL EVERY 4 HOURS PRN
Status: DISCONTINUED | OUTPATIENT
Start: 2018-10-09 | End: 2018-10-10 | Stop reason: HOSPADM

## 2018-10-09 RX ORDER — SODIUM CHLORIDE 9 MG/ML
INJECTION, SOLUTION INTRAVENOUS CONTINUOUS
Status: DISCONTINUED | OUTPATIENT
Start: 2018-10-09 | End: 2018-10-10 | Stop reason: HOSPADM

## 2018-10-09 RX ORDER — MORPHINE SULFATE 10 MG/ML
2 INJECTION, SOLUTION INTRAMUSCULAR; INTRAVENOUS
Status: ACTIVE | OUTPATIENT
Start: 2018-10-09 | End: 2018-10-09

## 2018-10-09 RX ORDER — SODIUM CHLORIDE 9 MG/ML
INJECTION, SOLUTION INTRAVENOUS CONTINUOUS
Status: DISCONTINUED | OUTPATIENT
Start: 2018-10-09 | End: 2018-10-09 | Stop reason: ALTCHOICE

## 2018-10-09 RX ORDER — FENTANYL CITRATE 50 UG/ML
25 INJECTION, SOLUTION INTRAMUSCULAR; INTRAVENOUS
Status: ACTIVE | OUTPATIENT
Start: 2018-10-09 | End: 2018-10-09

## 2018-10-09 RX ORDER — SODIUM CHLORIDE 0.9 % (FLUSH) 0.9 %
10 SYRINGE (ML) INJECTION PRN
Status: DISCONTINUED | OUTPATIENT
Start: 2018-10-09 | End: 2018-10-10 | Stop reason: HOSPADM

## 2018-10-09 RX ORDER — SODIUM CHLORIDE 0.9 % (FLUSH) 0.9 %
10 SYRINGE (ML) INJECTION EVERY 12 HOURS SCHEDULED
Status: DISCONTINUED | OUTPATIENT
Start: 2018-10-09 | End: 2018-10-10 | Stop reason: HOSPADM

## 2018-10-09 RX ADMIN — IOPAMIDOL 90 ML: 755 INJECTION, SOLUTION INTRAVENOUS at 09:24

## 2018-10-09 NOTE — H&P
Patient ID: Dia Emanuel is a 57 y.o. male. Chief Complaint   Patient presents with    Follow Up After Procedure       angiogram/plasty of the left leg. states that he continues to have some spasms in the left leg.        HPI  Patient notes that he is doing reasonably well. Walking comfortably but not overdoing it. Sleeping at night without foot pain. Some swelling still occurs in the left leg post revascularization     Review of Systems   Cardiovascular: Positive for leg swelling (left leg swelling). All other systems reviewed and are negative.        Objective:   Physical Exam   Constitutional: He appears well-developed and well-nourished. HENT:   Head: Normocephalic. Eyes: Pupils are equal, round, and reactive to light. Neck: Normal range of motion. Neck supple. Carotid bruit is not present. Cardiovascular: Normal rate, regular rhythm and normal heart sounds. Pulses:       Carotid pulses are 2+ on the right side, and 2+ on the left side. Radial pulses are 2+ on the right side, and 2+ on the left side. Femoral pulses are 1+ on the right side, and 2+ on the left side with bruit. Popliteal pulses are 1+ on the right side, and 2+ on the left side. Dorsalis pedis pulses are 0 on the right side, and 2+ on the left side. Posterior tibial pulses are 1+ on the right side, and 0 on the left side.    Doppler signals in right DP and PT with waveforms obtained  Normal multiphasic DP doppler signal in left foot  Left PT signal also multiphasic  Swelling of revascularization to left foot and ankle and lower leg  Skin remains intact  Varicose veins at the left ankle region      Ankle Brachial Index Report 8/13/18                                      Right               Left  Arm                             167                  168   Posterior Tibial          94                    WNO   Dorsalis Pedis           102                  160        LISA:

## 2018-10-10 NOTE — PROCEDURES
MD    D: 10/09/2018 9:15:09       T: 10/09/2018 11:48:15     [email or web address]  Job#: [id number]     Doc#: [id number]    CC:  MD TASH Ball MD

## 2019-01-30 RX ORDER — CLOPIDOGREL BISULFATE 75 MG/1
TABLET ORAL
Qty: 30 TABLET | Refills: 0 | Status: SHIPPED | OUTPATIENT
Start: 2019-01-30 | End: 2019-02-28 | Stop reason: SDUPTHER

## 2019-01-30 RX ORDER — AMLODIPINE BESYLATE 5 MG/1
TABLET ORAL
Qty: 30 TABLET | Refills: 6 | Status: SHIPPED | OUTPATIENT
Start: 2019-01-30 | End: 2019-04-22 | Stop reason: SDUPTHER

## 2019-02-28 RX ORDER — CLOPIDOGREL BISULFATE 75 MG/1
TABLET ORAL
Qty: 30 TABLET | Refills: 1 | Status: SHIPPED | OUTPATIENT
Start: 2019-02-28 | End: 2019-04-22 | Stop reason: SDUPTHER

## 2019-04-08 ENCOUNTER — TELEPHONE (OUTPATIENT)
Dept: CARDIOLOGY CLINIC | Age: 49
End: 2019-04-08

## 2019-04-22 ENCOUNTER — OFFICE VISIT (OUTPATIENT)
Dept: CARDIOLOGY CLINIC | Age: 49
End: 2019-04-22
Payer: COMMERCIAL

## 2019-04-22 VITALS
SYSTOLIC BLOOD PRESSURE: 130 MMHG | WEIGHT: 167 LBS | DIASTOLIC BLOOD PRESSURE: 80 MMHG | HEART RATE: 78 BPM | BODY MASS INDEX: 22.62 KG/M2 | OXYGEN SATURATION: 96 % | HEIGHT: 72 IN

## 2019-04-22 DIAGNOSIS — I10 ESSENTIAL HYPERTENSION: ICD-10-CM

## 2019-04-22 DIAGNOSIS — I25.10 CORONARY ARTERY DISEASE INVOLVING NATIVE CORONARY ARTERY OF NATIVE HEART WITHOUT ANGINA PECTORIS: Primary | ICD-10-CM

## 2019-04-22 DIAGNOSIS — E78.2 MIXED HYPERLIPIDEMIA: ICD-10-CM

## 2019-04-22 PROCEDURE — 99213 OFFICE O/P EST LOW 20 MIN: CPT | Performed by: INTERNAL MEDICINE

## 2019-04-22 RX ORDER — CLOPIDOGREL BISULFATE 75 MG/1
TABLET ORAL
Qty: 90 TABLET | Refills: 3 | Status: SHIPPED | OUTPATIENT
Start: 2019-04-22 | End: 2020-03-31 | Stop reason: SDUPTHER

## 2019-04-22 RX ORDER — NITROGLYCERIN 0.4 MG/1
0.4 TABLET SUBLINGUAL EVERY 5 MIN PRN
Qty: 25 TABLET | Refills: 3 | Status: SHIPPED | OUTPATIENT
Start: 2019-04-22

## 2019-04-22 RX ORDER — AMLODIPINE BESYLATE 5 MG/1
TABLET ORAL
Qty: 90 TABLET | Refills: 3 | Status: SHIPPED | OUTPATIENT
Start: 2019-04-22 | End: 2020-04-28 | Stop reason: SDUPTHER

## 2019-04-22 RX ORDER — SIMVASTATIN 40 MG
TABLET ORAL
Qty: 90 TABLET | Refills: 3 | Status: SHIPPED | OUTPATIENT
Start: 2019-04-22 | End: 2020-04-29 | Stop reason: SDUPTHER

## 2019-04-22 ASSESSMENT — ENCOUNTER SYMPTOMS
SHORTNESS OF BREATH: 0
ABDOMINAL DISTENTION: 0
COUGH: 0
EYE REDNESS: 0
BLOOD IN STOOL: 0
WHEEZING: 0

## 2019-04-22 NOTE — PATIENT INSTRUCTIONS
Patient Education        Learning About Coronary Artery Disease (CAD)  What is coronary artery disease? Coronary artery disease (CAD) occurs when plaque builds up in the arteries that bring oxygen-rich blood to your heart. Plaque is a fatty substance made of cholesterol, calcium, and other substances in the blood. This process is called hardening of the arteries, or atherosclerosis. What happens when you have coronary artery disease? · Plaque may narrow the coronary arteries. Narrowed arteries cause poor blood flow. This can lead to angina symptoms such as chest pain or discomfort. If blood flow is completely blocked, you could have a heart attack. · You can slow CAD and reduce the risk of future problems by making changes in your lifestyle. These include quitting smoking and eating heart-healthy foods. · Treatments for CAD, along with changes in your lifestyle, can help you live a longer and healthier life. How can you prevent coronary artery disease? · Do not smoke. It may be the best thing you can do to prevent heart disease. If you need help quitting, talk to your doctor about stop-smoking programs and medicines. These can increase your chances of quitting for good. · Be active. Get at least 30 minutes of exercise on most days of the week. Walking is a good choice. You also may want to do other activities, such as running, swimming, cycling, or playing tennis or team sports. · Eat heart-healthy foods. Eat more fruits and vegetables and less foods that contain saturated and trans fats. Limit alcohol, sodium, and sweets. · Stay at a healthy weight. Lose weight if you need to. · Manage other health problems such as diabetes, high blood pressure, and high cholesterol. · Manage stress. Stress can hurt your heart. To keep stress low, talk about your problems and feelings. Don't keep your feelings hidden. · If you have talked about it with your doctor, take a low-dose aspirin every day.  Aspirin can help certain people lower their risk of a heart attack or stroke. But taking aspirin isn't right for everyone, because it can cause serious bleeding. Do not start taking daily aspirin unless your doctor knows about it. How is coronary artery disease treated? · Your doctor will suggest that you make lifestyle changes. For example, your doctor may ask you to eat healthy foods, quit smoking, lose extra weight, and be more active. · You will have to take medicines. · Your doctor may suggest a procedure to open narrowed or blocked arteries. This is called angioplasty. Or your doctor may suggest using healthy blood vessels to create detours around narrowed or blocked arteries. This is called bypass surgery. Follow-up care is a key part of your treatment and safety. Be sure to make and go to all appointments, and call your doctor if you are having problems. It's also a good idea to know your test results and keep a list of the medicines you take. Where can you learn more? Go to https://[employer]. org and sign in to your [employer] account. Enter [phone] in the [employer] box to learn more about \"Learning About Coronary Artery Disease (CAD). \"     If you do not have an account, please click on the \"Sign Up Now\" link. Current as of: July 22, 2018  Content Version: 11.9  © 9318-7675 [employer]. Care instructions adapted under license by [facility] ([facility]). If you have questions about a medical condition or this instruction, always ask your healthcare professional. [place] [address] any warranty or liability for your use of this information.

## 2019-04-22 NOTE — PROGRESS NOTES
Heydi Hyde is a 54 y.o. male    Reason for Visit: f/u CAD      HPI  Heydi Hyde  denies chest pain, palpitations, dizziness, syncope, worsening L ankle swelling and increased dyspnea. Review of Systems   Constitutional: Negative for activity change, appetite change, chills, fatigue, fever and unexpected weight change. HENT: Negative for congestion, nosebleeds and tinnitus. Eyes: Negative for redness and visual disturbance. Respiratory: Negative for cough, shortness of breath and wheezing. Cardiovascular: Negative for chest pain, palpitations and leg swelling. Gastrointestinal: Negative for abdominal distention and blood in stool. Genitourinary: Negative for dysuria and hematuria. Musculoskeletal: Negative for gait problem and myalgias. Neurological: Negative for dizziness and speech difficulty. Hematological: Does not bruise/bleed easily. Psychiatric/Behavioral: Negative for behavioral problems and confusion. All other systems reviewed and are negative. Physical Exam   Constitutional: He is oriented to person, place, and time. He appears well-developed and well-nourished. HENT:   Head: Normocephalic and atraumatic. Eyes: Conjunctivae are normal. Right eye exhibits no discharge. Left eye exhibits no discharge. Neck: Normal range of motion. Neck supple. Cardiovascular: Normal rate, regular rhythm, normal heart sounds and intact distal pulses. Pulmonary/Chest: Effort normal and breath sounds normal.   Abdominal: Soft. Bowel sounds are normal.   Musculoskeletal: Normal range of motion. He exhibits no edema. Neurological: He is alert and oriented to person, place, and time. Skin: Skin is warm and dry. Psychiatric: He has a normal mood and affect. His behavior is normal.   Nursing note and vitals reviewed.       Wt Readings from Last 3 Encounters:   04/22/19 167 lb (75.8 kg)   10/09/18 154 lb 8 oz (70.1 kg)   09/17/18 157 lb (71.2 kg)     BP Readings from Last 3 Encounters:   04/22/19 130/80   09/17/18 (!) 172/91   08/13/18 (!) 168/84     Pulse Readings from Last 3 Encounters:   04/22/19 78   09/17/18 74   08/13/18 72         Current Outpatient Medications:     clopidogrel (PLAVIX) 75 MG tablet, TAKE 1 TABLET BY MOUTH EVERY DAY, Disp: 30 tablet, Rfl: 1    amLODIPine (NORVASC) 5 MG tablet, TAKE 1 TABLET BY MOUTH EVERY DAY, Disp: 30 tablet, Rfl: 6    metoprolol tartrate (LOPRESSOR) 25 MG tablet, Take 1 tablet by mouth 2 times daily, Disp: 180 tablet, Rfl: 3    nitroGLYCERIN (NITROSTAT) 0.4 MG SL tablet, Place 1 tablet under the tongue every 5 minutes as needed for Chest pain, Disp: 25 tablet, Rfl: 3    simvastatin (ZOCOR) 40 MG tablet, TAKE 1 TABLET NIGHTLY, Disp: 90 tablet, Rfl: 3    metoclopramide (REGLAN) 5 MG tablet, Take 5 mg by mouth as needed, Disp: , Rfl:     methocarbamol (ROBAXIN) 750 MG tablet, Take 750 mg by mouth as needed, Disp: , Rfl:     topiramate (TOPAMAX) 50 MG tablet, Take 50 mg by mouth as needed, Disp: , Rfl:     buPROPion (WELLBUTRIN XL) 300 MG XL tablet, Take 150 mg by mouth every morning , Disp: , Rfl:     traZODone (DESYREL) 100 MG tablet, Take 100 mg by mouth as needed. , Disp: , Rfl:     aspirin 325 MG tablet, Take 325 mg by mouth daily. , Disp: , Rfl:     Past Medical History:   Diagnosis Date    Acute MI ([facility]     inferior wall    Alcohol abuse     history     [facility]     CAD (coronary artery disease)     S/P IWMIi 4/10 w/ RCA FIONA X3, normal LVEF. Cath 11/10 nonobstructive LAD dz, RCA stent with moderate instent restenosis.  s/p OM fiona X2 12/10    Closed fracture of cervical spine ([facility]     2000; C2-3; fell off ladder    Depression     not on meds    GERD (gastroesophageal reflux disease)     managed by PCP    Hyperlipidemia     rec semi annual lipids with LDL goal <70    Hypertension     controlled    Nausea & vomiting     with anesthesia    PAD (peripheral artery disease) (HCC)     Aortogram 90% SFA followed by Dr. Any Mosqueda user     cessation discussed; past 25 years; 0.5 ppd       Social History     Socioeconomic History    Marital status:      Spouse name: None    Number of children: None    Years of education: None    Highest education level: None   Occupational History    None   Social Needs    Financial resource strain: None    Food insecurity:     Worry: None     Inability: None    Transportation needs:     Medical: None     Non-medical: None   Tobacco Use    Smoking status: Former Smoker     Packs/day: 0.25     Years: 25.00     Pack years: 6.25     Last attempt to quit: 3/5/2014     Years since quittin.1    Smokeless tobacco: Never Used   Substance and Sexual Activity    Alcohol use:  Yes     Alcohol/week: 28.8 oz     Types: 48 Cans of beer per week     Comment: 8 beers per day @ least.    Drug use: No    Sexual activity: Yes     Partners: Female   Lifestyle    Physical activity:     Days per week: None     Minutes per session: None    Stress: None   Relationships    Social connections:     Talks on phone: None     Gets together: None     Attends Jain service: None     Active member of club or organization: None     Attends meetings of clubs or organizations: None     Relationship status: None    Intimate partner violence:     Fear of current or ex partner: None     Emotionally abused: None     Physically abused: None     Forced sexual activity: None   Other Topics Concern    None   Social History Narrative    None       Past Surgical History:   Procedure Laterality Date    CORONARY ANGIOPLASTY WITH STENT PLACEMENT  2011    stent x 1    CORONARY ANGIOPLASTY WITH STENT PLACEMENT      IR FEMORAL POPLITEAL BYPASS GRAFT  11    Left    LEG SURGERY Left 2018    stent  x 2; vascular dz; Left EIA angioplasty:  Left SFA/Pop atherectomy/angioplasty of native arteries       Family History   Problem Relation Age of Onset    Heart Disease Mother     Diabetes Father     High Blood Pressure Father     High Blood Pressure Brother     High Blood Pressure Paternal Uncle     Heart Disease Paternal Uncle     Stroke Paternal Uncle     Diabetes Paternal Uncle        No Known Allergies    Recent Labs and Imaging reviewed    Assessment        CAD (coronary artery disease)      S/P IWMI 4/10 w/ RCA SISSY X3, normal LVEF. Cath 11/10 nonobstructive LAD dz, RCA stent with moderate instent restenosis. s/p OM SISSY X2 12/10. Cardiac cath 3/2014 : revealed severe left main trunk stenosis sent immediately for CABGx3 on  3/11/2014 LIMA-LAD, SVG-OM, SVG-PDA    HYPERTENSION      controlled. Taking ACE prn only due to hyperkalemia.  Hyperlipidemia       On statin, unable to tolerate high intensity statin. LFT's elevated in past. LDL 34, ,  4/2019    PAD (peripheral artery disease) Buergers dz      Aortogram 90% L SFA s/p stent. s/p vascular surgery left leg 7/11 Dr. Gt Santos. S/p PTA Dr. Danny Jonas. ETOH abuse in past       avoidance discussed    Tobacco user      Encouraged continued cessation discussed - quit smoking 2014     GERD (gastroesophageal reflux disease)      managed by PCP     Plan   No evidence of CHF. No angina. Continue ASA, statin, plavix (PAD), and BB. Ongoing risk factor modification also discussed. Fasting lipid profile, CMP prior to next visit. Return in about 6 months (around 10/22/2019). This note was scribed in the presence of the physician by Vandana Fonseca RN.'    The scribes documentation has been prepared under my direction and personally reviewed by me in its entirety. I confirm that the note above accurately reflects all work, treatment, procedures, and medical decision making performed by me.

## 2019-10-25 ENCOUNTER — OFFICE VISIT (OUTPATIENT)
Dept: SURGERY | Age: 49
End: 2019-10-25
Payer: COMMERCIAL

## 2019-10-25 ENCOUNTER — TELEPHONE (OUTPATIENT)
Dept: SURGERY | Age: 49
End: 2019-10-25

## 2019-10-25 VITALS
HEIGHT: 73 IN | WEIGHT: 174 LBS | HEART RATE: 78 BPM | DIASTOLIC BLOOD PRESSURE: 92 MMHG | SYSTOLIC BLOOD PRESSURE: 157 MMHG | BODY MASS INDEX: 23.06 KG/M2

## 2019-10-25 DIAGNOSIS — I73.9 PAD (PERIPHERAL ARTERY DISEASE) (HCC): Primary | ICD-10-CM

## 2019-10-25 DIAGNOSIS — M79.605 PAIN OF LEFT LOWER EXTREMITY: ICD-10-CM

## 2019-10-25 PROCEDURE — 99214 OFFICE O/P EST MOD 30 MIN: CPT | Performed by: SURGERY

## 2019-10-28 ENCOUNTER — PREP FOR PROCEDURE (OUTPATIENT)
Dept: VASCULAR SURGERY | Age: 49
End: 2019-10-28

## 2019-10-28 RX ORDER — ALPRAZOLAM 0.25 MG/1
0.5 TABLET ORAL
Status: CANCELLED | OUTPATIENT
Start: 2019-10-28 | End: 2019-10-28

## 2019-10-28 RX ORDER — SODIUM CHLORIDE 9 MG/ML
INJECTION, SOLUTION INTRAVENOUS CONTINUOUS
Status: CANCELLED | OUTPATIENT
Start: 2019-10-28

## 2019-10-28 RX ORDER — SODIUM CHLORIDE 0.9 % (FLUSH) 0.9 %
10 SYRINGE (ML) INJECTION EVERY 12 HOURS SCHEDULED
Status: CANCELLED | OUTPATIENT
Start: 2019-10-28

## 2019-10-28 RX ORDER — SODIUM CHLORIDE 0.9 % (FLUSH) 0.9 %
10 SYRINGE (ML) INJECTION PRN
Status: CANCELLED | OUTPATIENT
Start: 2019-10-28

## 2019-10-30 ENCOUNTER — HOSPITAL ENCOUNTER (OUTPATIENT)
Dept: CARDIAC CATH/INVASIVE PROCEDURES | Age: 49
Discharge: HOME | End: 2019-10-30
Payer: COMMERCIAL

## 2019-10-30 ENCOUNTER — TELEPHONE (OUTPATIENT)
Dept: SURGERY | Age: 49
End: 2019-10-30

## 2019-10-30 VITALS
BODY MASS INDEX: 22.03 KG/M2 | WEIGHT: 166.23 LBS | RESPIRATION RATE: 19 BRPM | SYSTOLIC BLOOD PRESSURE: 161 MMHG | HEART RATE: 78 BPM | OXYGEN SATURATION: 100 % | DIASTOLIC BLOOD PRESSURE: 93 MMHG | HEIGHT: 73 IN

## 2019-10-30 LAB — POC ACT LR: 319 SEC

## 2019-10-30 PROCEDURE — 99153 MOD SED SAME PHYS/QHP EA: CPT

## 2019-10-30 PROCEDURE — 6360000002 HC RX W HCPCS

## 2019-10-30 PROCEDURE — 2500000003 HC RX 250 WO HCPCS

## 2019-10-30 PROCEDURE — C1894 INTRO/SHEATH, NON-LASER: HCPCS

## 2019-10-30 PROCEDURE — C1760 CLOSURE DEV, VASC: HCPCS

## 2019-10-30 PROCEDURE — 75625 CONTRAST EXAM ABDOMINL AORTA: CPT

## 2019-10-30 PROCEDURE — 75774 ARTERY X-RAY EACH VESSEL: CPT

## 2019-10-30 PROCEDURE — 75625 CONTRAST EXAM ABDOMINL AORTA: CPT | Performed by: SURGERY

## 2019-10-30 PROCEDURE — 99152 MOD SED SAME PHYS/QHP 5/>YRS: CPT

## 2019-10-30 PROCEDURE — 75774 ARTERY X-RAY EACH VESSEL: CPT | Performed by: SURGERY

## 2019-10-30 PROCEDURE — 76937 US GUIDE VASCULAR ACCESS: CPT | Performed by: SURGERY

## 2019-10-30 PROCEDURE — 6360000004 HC RX CONTRAST MEDICATION: Performed by: SURGERY

## 2019-10-30 PROCEDURE — C1769 GUIDE WIRE: HCPCS

## 2019-10-30 PROCEDURE — C1887 CATHETER, GUIDING: HCPCS

## 2019-10-30 PROCEDURE — 36247 INS CATH ABD/L-EXT ART 3RD: CPT | Performed by: SURGERY

## 2019-10-30 PROCEDURE — 75716 ARTERY X-RAYS ARMS/LEGS: CPT

## 2019-10-30 PROCEDURE — 36247 INS CATH ABD/L-EXT ART 3RD: CPT

## 2019-10-30 PROCEDURE — 75716 ARTERY X-RAYS ARMS/LEGS: CPT | Performed by: SURGERY

## 2019-10-30 PROCEDURE — 85347 COAGULATION TIME ACTIVATED: CPT

## 2019-10-30 RX ORDER — SODIUM CHLORIDE 0.9 % (FLUSH) 0.9 %
10 SYRINGE (ML) INJECTION PRN
Status: DISCONTINUED | OUTPATIENT
Start: 2019-10-30 | End: 2019-10-31 | Stop reason: HOSPADM

## 2019-10-30 RX ORDER — SODIUM CHLORIDE 0.9 % (FLUSH) 0.9 %
10 SYRINGE (ML) INJECTION EVERY 12 HOURS SCHEDULED
Status: DISCONTINUED | OUTPATIENT
Start: 2019-10-30 | End: 2019-10-31 | Stop reason: HOSPADM

## 2019-10-30 RX ORDER — ACETAMINOPHEN 325 MG/1
650 TABLET ORAL EVERY 4 HOURS PRN
Status: DISCONTINUED | OUTPATIENT
Start: 2019-10-30 | End: 2019-10-31 | Stop reason: HOSPADM

## 2019-10-30 RX ORDER — ALPRAZOLAM 0.5 MG/1
0.5 TABLET ORAL
Status: DISCONTINUED | OUTPATIENT
Start: 2019-10-30 | End: 2019-10-30 | Stop reason: ALTCHOICE

## 2019-10-30 RX ORDER — SODIUM CHLORIDE 9 MG/ML
INJECTION, SOLUTION INTRAVENOUS CONTINUOUS
Status: DISCONTINUED | OUTPATIENT
Start: 2019-10-30 | End: 2019-10-31 | Stop reason: HOSPADM

## 2019-10-30 RX ORDER — SODIUM CHLORIDE 0.9 % (FLUSH) 0.9 %
10 SYRINGE (ML) INJECTION PRN
Status: DISCONTINUED | OUTPATIENT
Start: 2019-10-30 | End: 2019-10-30 | Stop reason: ALTCHOICE

## 2019-10-30 RX ORDER — SODIUM CHLORIDE 0.9 % (FLUSH) 0.9 %
10 SYRINGE (ML) INJECTION EVERY 12 HOURS SCHEDULED
Status: DISCONTINUED | OUTPATIENT
Start: 2019-10-30 | End: 2019-10-30 | Stop reason: ALTCHOICE

## 2019-10-30 RX ADMIN — IOPAMIDOL 74 ML: 755 INJECTION, SOLUTION INTRAVENOUS at 13:17

## 2019-11-15 ENCOUNTER — OFFICE VISIT (OUTPATIENT)
Dept: SURGERY | Age: 49
End: 2019-11-15
Payer: COMMERCIAL

## 2019-11-15 ENCOUNTER — PROCEDURE VISIT (OUTPATIENT)
Dept: SURGERY | Age: 49
End: 2019-11-15
Payer: COMMERCIAL

## 2019-11-15 VITALS
DIASTOLIC BLOOD PRESSURE: 89 MMHG | WEIGHT: 175.4 LBS | BODY MASS INDEX: 23.14 KG/M2 | HEART RATE: 80 BPM | SYSTOLIC BLOOD PRESSURE: 149 MMHG

## 2019-11-15 DIAGNOSIS — Z98.62 STATUS POST PERIPHERAL ARTERY ANGIOPLASTY: ICD-10-CM

## 2019-11-15 DIAGNOSIS — Z01.818 PREOP EXAMINATION: ICD-10-CM

## 2019-11-15 DIAGNOSIS — I73.9 PAD (PERIPHERAL ARTERY DISEASE) (HCC): Primary | ICD-10-CM

## 2019-11-15 DIAGNOSIS — I73.9 PAD (PERIPHERAL ARTERY DISEASE) (HCC): ICD-10-CM

## 2019-11-15 DIAGNOSIS — M79.605 PAIN OF LEFT LOWER EXTREMITY: Primary | ICD-10-CM

## 2019-11-15 PROCEDURE — 99214 OFFICE O/P EST MOD 30 MIN: CPT | Performed by: SURGERY

## 2019-11-15 PROCEDURE — 93971 EXTREMITY STUDY: CPT | Performed by: SURGERY

## 2019-11-27 ENCOUNTER — OFFICE VISIT (OUTPATIENT)
Dept: CARDIOLOGY CLINIC | Age: 49
End: 2019-11-27
Payer: COMMERCIAL

## 2019-11-27 VITALS
OXYGEN SATURATION: 99 % | HEIGHT: 73 IN | DIASTOLIC BLOOD PRESSURE: 80 MMHG | HEART RATE: 80 BPM | SYSTOLIC BLOOD PRESSURE: 130 MMHG | WEIGHT: 172.6 LBS | BODY MASS INDEX: 22.88 KG/M2

## 2019-11-27 DIAGNOSIS — E78.2 MIXED HYPERLIPIDEMIA: ICD-10-CM

## 2019-11-27 DIAGNOSIS — I25.10 CORONARY ARTERY DISEASE INVOLVING NATIVE CORONARY ARTERY OF NATIVE HEART WITHOUT ANGINA PECTORIS: ICD-10-CM

## 2019-11-27 DIAGNOSIS — Z01.810 PREOP CARDIOVASCULAR EXAM: Primary | ICD-10-CM

## 2019-11-27 PROCEDURE — 99215 OFFICE O/P EST HI 40 MIN: CPT | Performed by: INTERNAL MEDICINE

## 2019-11-27 PROCEDURE — 93000 ELECTROCARDIOGRAM COMPLETE: CPT | Performed by: INTERNAL MEDICINE

## 2019-11-27 ASSESSMENT — ENCOUNTER SYMPTOMS
BLOOD IN STOOL: 0
ABDOMINAL DISTENTION: 0
SHORTNESS OF BREATH: 0
COUGH: 0
EYE REDNESS: 0
WHEEZING: 0

## 2019-12-11 ENCOUNTER — HOSPITAL ENCOUNTER (OUTPATIENT)
Dept: NON INVASIVE DIAGNOSTICS | Age: 49
Discharge: HOME | End: 2019-12-11
Payer: COMMERCIAL

## 2019-12-11 DIAGNOSIS — I25.10 CORONARY ARTERY DISEASE INVOLVING NATIVE CORONARY ARTERY OF NATIVE HEART WITHOUT ANGINA PECTORIS: ICD-10-CM

## 2019-12-11 DIAGNOSIS — Z01.810 PREOP CARDIOVASCULAR EXAM: ICD-10-CM

## 2019-12-11 LAB
LV EF: 70 %
LVEF MODALITY: NORMAL

## 2019-12-11 PROCEDURE — 78452 HT MUSCLE IMAGE SPECT MULT: CPT

## 2019-12-11 PROCEDURE — 6360000002 HC RX W HCPCS: Performed by: INTERNAL MEDICINE

## 2019-12-11 PROCEDURE — A9502 TC99M TETROFOSMIN: HCPCS | Performed by: INTERNAL MEDICINE

## 2019-12-11 PROCEDURE — 3430000000 HC RX DIAGNOSTIC RADIOPHARMACEUTICAL: Performed by: INTERNAL MEDICINE

## 2019-12-11 PROCEDURE — 93017 CV STRESS TEST TRACING ONLY: CPT

## 2019-12-11 RX ADMIN — TETROFOSMIN 10 MILLICURIE: 1.38 INJECTION, POWDER, LYOPHILIZED, FOR SOLUTION INTRAVENOUS at 09:45

## 2019-12-11 RX ADMIN — REGADENOSON 0.4 MG: 0.08 INJECTION, SOLUTION INTRAVENOUS at 11:05

## 2019-12-11 RX ADMIN — TETROFOSMIN 30 MILLICURIE: 1.38 INJECTION, POWDER, LYOPHILIZED, FOR SOLUTION INTRAVENOUS at 11:09

## 2020-01-21 ENCOUNTER — OFFICE VISIT (OUTPATIENT)
Dept: SURGERY | Age: 50
End: 2020-01-21
Payer: MEDICARE

## 2020-01-21 ENCOUNTER — PROCEDURE VISIT (OUTPATIENT)
Dept: SURGERY | Age: 50
End: 2020-01-21
Payer: MEDICARE

## 2020-01-21 VITALS
HEIGHT: 72 IN | HEART RATE: 86 BPM | DIASTOLIC BLOOD PRESSURE: 90 MMHG | WEIGHT: 165 LBS | BODY MASS INDEX: 22.35 KG/M2 | SYSTOLIC BLOOD PRESSURE: 166 MMHG

## 2020-01-21 PROCEDURE — 99214 OFFICE O/P EST MOD 30 MIN: CPT | Performed by: SURGERY

## 2020-01-21 PROCEDURE — 93880 EXTRACRANIAL BILAT STUDY: CPT | Performed by: SURGERY

## 2020-01-21 RX ORDER — CEPHALEXIN 500 MG/1
500 CAPSULE ORAL 2 TIMES DAILY
Qty: 20 CAPSULE | Refills: 0 | Status: SHIPPED | OUTPATIENT
Start: 2020-01-21 | End: 2020-01-31

## 2020-01-21 RX ORDER — OXYCODONE HYDROCHLORIDE AND ACETAMINOPHEN 5; 325 MG/1; MG/1
1 TABLET ORAL NIGHTLY
Qty: 14 TABLET | Refills: 0 | Status: SHIPPED | OUTPATIENT
Start: 2020-01-21 | End: 2020-02-04

## 2020-01-21 NOTE — PROGRESS NOTES
native arteries       Social History     Socioeconomic History    Marital status:      Spouse name: Not on file    Number of children: Not on file    Years of education: Not on file    Highest education level: Not on file   Occupational History    Not on file   Social Needs    Financial resource strain: Not on file    Food insecurity:     Worry: Not on file     Inability: Not on file    Transportation needs:     Medical: Not on file     Non-medical: Not on file   Tobacco Use    Smoking status: Former Smoker     Packs/day: 0.25     Years: 25.00     Pack years: 6.25     Last attempt to quit: 3/5/2014     Years since quittin.8    Smokeless tobacco: Never Used   Substance and Sexual Activity    Alcohol use:  Yes     Alcohol/week: 48.0 standard drinks     Types: 48 Cans of beer per week     Comment: 8 beers per day @ least.    Drug use: No    Sexual activity: Yes     Partners: Female   Lifestyle    Physical activity:     Days per week: Not on file     Minutes per session: Not on file    Stress: Not on file   Relationships    Social connections:     Talks on phone: Not on file     Gets together: Not on file     Attends Zoroastrian service: Not on file     Active member of club or organization: Not on file     Attends meetings of clubs or organizations: Not on file     Relationship status: Not on file    Intimate partner violence:     Fear of current or ex partner: Not on file     Emotionally abused: Not on file     Physically abused: Not on file     Forced sexual activity: Not on file   Other Topics Concern    Not on file   Social History Narrative    Not on file       Family History   Problem Relation Age of Onset    Heart Disease Mother     Diabetes Father     High Blood Pressure Father     High Blood Pressure Brother     High Blood Pressure Paternal Uncle     Heart Disease Paternal Uncle     Stroke Paternal Uncle     Diabetes Paternal Uncle          Current Outpatient Medications:    metoprolol tartrate (LOPRESSOR) 25 MG tablet, TAKE 1 TABLET BY MOUTH TWICE A DAY, Disp: 180 tablet, Rfl: 3    clopidogrel (PLAVIX) 75 MG tablet, TAKE 1 TABLET BY MOUTH EVERY DAY, Disp: 90 tablet, Rfl: 3    amLODIPine (NORVASC) 5 MG tablet, TAKE 1 TABLET BY MOUTH EVERY DAY, Disp: 90 tablet, Rfl: 3    simvastatin (ZOCOR) 40 MG tablet, TAKE 1 TABLET NIGHTLY, Disp: 90 tablet, Rfl: 3    nitroGLYCERIN (NITROSTAT) 0.4 MG SL tablet, Place 1 tablet under the tongue every 5 minutes as needed for Chest pain, Disp: 25 tablet, Rfl: 3    aspirin 325 MG tablet, Take 325 mg by mouth daily. , Disp: , Rfl:     Patient has no known allergies. Vitals:    01/21/20 0924   BP: (!) 166/90   Pulse: 86   Weight: 165 lb (74.8 kg)   Height: 6' (1.829 m)       Hospital Outpatient Visit on 12/11/2019   Component Date Value Ref Range Status    Left Ventricular Ejection Fraction 12/11/2019 70   Final-Edited    LVEF MODALITY 12/11/2019 Nuclear   Final-Edited       Review of Systems   Eyes: Positive for visual disturbance (wears glasses). Cardiovascular: Positive for leg swelling (left leg swelling). All other systems reviewed and are negative. Physical Exam  Vitals signs reviewed. Constitutional:       Appearance: He is well-developed. HENT:      Head: Normocephalic. Eyes:      Pupils: Pupils are equal, round, and reactive to light. Neck:      Musculoskeletal: Normal range of motion and neck supple. Vascular: No carotid bruit. Cardiovascular:      Rate and Rhythm: Normal rate and regular rhythm. Pulses:           Carotid pulses are 2+ on the right side with bruit and 2+ on the left side. Radial pulses are 2+ on the right side and 2+ on the left side. Femoral pulses are 2+ on the right side and 2+ on the left side. Popliteal pulses are 1+ on the right side and 2+ on the left side. Dorsalis pedis pulses are 2+ on the right side and 0 on the left side.         Posterior tibial pulses disease) ([facility] - Primary          PLAN:    A wound culture was taken of his left leg. He also had a carotid duplex study done today. Results were normal, so a letter will be sent to [facility] to schedule him for surgery 2/5/2020. The nonhealing ulcer on his shin may represent a significant threat one that is with his ischemia is gone to be hard to get it to heal and to infection may get into our calf or groin or arm incisions  Surgery would be: Left femoral to posterial tibial artery using left lesser saphenous reversed and left arm cephalic vein reversed and spliced together. This is a six hour surgery. I recommended getting meta honey and putting it on his ulcer daily  I Kalyani Alvarez MA am scribing for and in the presence of Lauryn Hamm MD on this date of 01/21/20    I Ramone Mistry MD personally performed the services described in this documentation as scribed by the Medical Assistant Kalyani Alvarez in my presence and it is both accurate and complete.         Electronically signed by Lauryn Hamm MD on 1/21/2020 at 9:43 AM

## 2020-01-23 LAB
GRAM STAIN RESULT: ABNORMAL
ORGANISM: ABNORMAL
ORGANISM: ABNORMAL
WOUND/ABSCESS: ABNORMAL
WOUND/ABSCESS: ABNORMAL

## 2020-01-27 ENCOUNTER — TELEPHONE (OUTPATIENT)
Dept: SURGERY | Age: 50
End: 2020-01-27

## 2020-01-27 NOTE — PROGRESS NOTES
toes      For your safety, please do not wear any jewelry or body piercing's on the day of surgery. All jewelry must be removed. If you have dentures, they will be removed before going to operating room. For your convenience, we will provide you with a container. If you wear contact lenses or glasses, they will be removed, please bring a case for them. If you have a living will and a durable power of  for healthcare, please bring in a copy. As part of our patient safety program to minimize surgical site infections, we ask you to do the following:    · Please notify your surgeon if you develop any illness between         now and the  day of your surgery. · This includes a cough, cold, fever, sore throat, nausea,         or vomiting, and diarrhea, etc.  ·  Please notify your surgeon if you experience dizziness, shortness         of breath or blurred vision between now and the time of your surgery. Do not shave your operative site 96 hours prior to surgery. For face and neck surgery, men may use an electric razor 48 hours   prior to surgery. You may shower the night before surgery or the morning of   your surgery with an antibacterial soap. You will need to bring a photo ID and insurance card    [facility] has an onsite pharmacy, would you like to utilize our pharmacy     If you will be staying overnight and use a C-pap machine, please bring   your C-pap to hospital     Our goal is to provide you with excellent care, therefore, visitors will be limited to two(2) in the room at a time so that we may focus on providing this care for you. Please contact pre-admission testing if you have any further questions. [facility] phone number:  [phone] PAT fax number:  [phone]  Please note these are generalized instructions for all surgical cases, you may be provided with more specific instructions according to your surgery.

## 2020-01-27 NOTE — PROGRESS NOTES
C-Difficile admission screening and protocol:     * Admitted with diarrhea? YES____    NO___X__     *Prior history of C-Diff. In last 3 months? YES____   NO__X___     *Antibiotic use in the past 6-8 weeks? NO______YES__X____                 If yes which  ANTIBIOTIC AND REASON___leg wound___     *Prior hospitalization or nursing home in the last month?  YES____   NO__X__

## 2020-02-04 ENCOUNTER — ANESTHESIA EVENT (OUTPATIENT)
Dept: OPERATING ROOM | Age: 50
DRG: 253 | End: 2020-02-04
Payer: COMMERCIAL

## 2020-02-05 ENCOUNTER — HOSPITAL ENCOUNTER (INPATIENT)
Age: 50
LOS: 2 days | Discharge: HOME | DRG: 253 | End: 2020-02-07
Attending: SURGERY | Admitting: SURGERY
Payer: COMMERCIAL

## 2020-02-05 ENCOUNTER — ANESTHESIA (OUTPATIENT)
Dept: OPERATING ROOM | Age: 50
DRG: 253 | End: 2020-02-05
Payer: COMMERCIAL

## 2020-02-05 VITALS
SYSTOLIC BLOOD PRESSURE: 114 MMHG | RESPIRATION RATE: 4 BRPM | OXYGEN SATURATION: 99 % | TEMPERATURE: 97.5 F | DIASTOLIC BLOOD PRESSURE: 72 MMHG

## 2020-02-05 PROBLEM — I99.8 ISCHEMIC PAIN OF LEFT FOOT: Status: ACTIVE | Noted: 2020-02-05

## 2020-02-05 PROBLEM — M79.672 ISCHEMIC PAIN OF LEFT FOOT: Status: ACTIVE | Noted: 2020-02-05

## 2020-02-05 LAB
ABO/RH: NORMAL
ANION GAP SERPL CALCULATED.3IONS-SCNC: 14 MMOL/L (ref 3–16)
ANTIBODY SCREEN: NORMAL
APTT: 44.9 SEC (ref 24.2–36.2)
BUN BLDV-MCNC: 9 MG/DL (ref 7–20)
CALCIUM SERPL-MCNC: 9.2 MG/DL (ref 8.3–10.6)
CHLORIDE BLD-SCNC: 93 MMOL/L (ref 99–110)
CO2: 23 MMOL/L (ref 21–32)
CREAT SERPL-MCNC: 0.8 MG/DL (ref 0.9–1.3)
GFR AFRICAN AMERICAN: >60
GFR NON-AFRICAN AMERICAN: >60
GLUCOSE BLD-MCNC: 99 MG/DL (ref 70–99)
HCT VFR BLD CALC: 42 % (ref 40.5–52.5)
HEMOGLOBIN: 14.8 G/DL (ref 13.5–17.5)
MCH RBC QN AUTO: 34.2 PG (ref 26–34)
MCHC RBC AUTO-ENTMCNC: 35.2 G/DL (ref 31–36)
MCV RBC AUTO: 97.3 FL (ref 80–100)
PDW BLD-RTO: 12.8 % (ref 12.4–15.4)
PLATELET # BLD: 201 K/UL (ref 135–450)
PMV BLD AUTO: 6.4 FL (ref 5–10.5)
POTASSIUM REFLEX MAGNESIUM: 4.3 MMOL/L (ref 3.5–5.1)
RBC # BLD: 4.31 M/UL (ref 4.2–5.9)
SODIUM BLD-SCNC: 130 MMOL/L (ref 136–145)
WBC # BLD: 6.4 K/UL (ref 4–11)

## 2020-02-05 PROCEDURE — 35566 ART BYP FEM-ANT-POST TIB/PRL: CPT | Performed by: SURGERY

## 2020-02-05 PROCEDURE — 7100000001 HC PACU RECOVERY - ADDTL 15 MIN: Performed by: SURGERY

## 2020-02-05 PROCEDURE — 6360000002 HC RX W HCPCS: Performed by: SURGERY

## 2020-02-05 PROCEDURE — 85027 COMPLETE CBC AUTOMATED: CPT

## 2020-02-05 PROCEDURE — 6360000002 HC RX W HCPCS: Performed by: ANESTHESIOLOGY

## 2020-02-05 PROCEDURE — 6360000002 HC RX W HCPCS: Performed by: NURSE ANESTHETIST, CERTIFIED REGISTERED

## 2020-02-05 PROCEDURE — 06BQ0ZZ EXCISION OF LEFT SAPHENOUS VEIN, OPEN APPROACH: ICD-10-PCS | Performed by: SURGERY

## 2020-02-05 PROCEDURE — 86850 RBC ANTIBODY SCREEN: CPT

## 2020-02-05 PROCEDURE — 2580000003 HC RX 258: Performed by: ANESTHESIOLOGY

## 2020-02-05 PROCEDURE — 2709999900 HC NON-CHARGEABLE SUPPLY: Performed by: SURGERY

## 2020-02-05 PROCEDURE — 86901 BLOOD TYPING SEROLOGIC RH(D): CPT

## 2020-02-05 PROCEDURE — 7100000000 HC PACU RECOVERY - FIRST 15 MIN: Performed by: SURGERY

## 2020-02-05 PROCEDURE — 2720000010 HC SURG SUPPLY STERILE: Performed by: SURGERY

## 2020-02-05 PROCEDURE — 35500 HARVEST VEIN FOR BYPASS: CPT | Performed by: SURGERY

## 2020-02-05 PROCEDURE — 94761 N-INVAS EAR/PLS OXIMETRY MLT: CPT

## 2020-02-05 PROCEDURE — C1894 INTRO/SHEATH, NON-LASER: HCPCS | Performed by: SURGERY

## 2020-02-05 PROCEDURE — 041L09N BYPASS LEFT FEMORAL ARTERY TO POSTERIOR TIBIAL ARTERY WITH AUTOLOGOUS VENOUS TISSUE, OPEN APPROACH: ICD-10-PCS | Performed by: SURGERY

## 2020-02-05 PROCEDURE — 3700000000 HC ANESTHESIA ATTENDED CARE: Performed by: SURGERY

## 2020-02-05 PROCEDURE — C1757 CATH, THROMBECTOMY/EMBOLECT: HCPCS | Performed by: SURGERY

## 2020-02-05 PROCEDURE — 2580000003 HC RX 258: Performed by: NURSE ANESTHETIST, CERTIFIED REGISTERED

## 2020-02-05 PROCEDURE — 3600000004 HC SURGERY LEVEL 4 BASE: Performed by: SURGERY

## 2020-02-05 PROCEDURE — 86900 BLOOD TYPING SEROLOGIC ABO: CPT

## 2020-02-05 PROCEDURE — 85730 THROMBOPLASTIN TIME PARTIAL: CPT

## 2020-02-05 PROCEDURE — 3700000001 HC ADD 15 MINUTES (ANESTHESIA): Performed by: SURGERY

## 2020-02-05 PROCEDURE — 2780000010 HC IMPLANT OTHER: Performed by: SURGERY

## 2020-02-05 PROCEDURE — 2000000000 HC ICU R&B

## 2020-02-05 PROCEDURE — 80048 BASIC METABOLIC PNL TOTAL CA: CPT

## 2020-02-05 PROCEDURE — 2580000003 HC RX 258: Performed by: SURGERY

## 2020-02-05 PROCEDURE — 2500000003 HC RX 250 WO HCPCS: Performed by: NURSE ANESTHETIST, CERTIFIED REGISTERED

## 2020-02-05 PROCEDURE — 2700000000 HC OXYGEN THERAPY PER DAY

## 2020-02-05 PROCEDURE — 36415 COLL VENOUS BLD VENIPUNCTURE: CPT

## 2020-02-05 PROCEDURE — 6370000000 HC RX 637 (ALT 250 FOR IP): Performed by: SURGERY

## 2020-02-05 PROCEDURE — 3600000014 HC SURGERY LEVEL 4 ADDTL 15MIN: Performed by: SURGERY

## 2020-02-05 RX ORDER — OXYCODONE HYDROCHLORIDE AND ACETAMINOPHEN 5; 325 MG/1; MG/1
1 TABLET ORAL PRN
Status: DISCONTINUED | OUTPATIENT
Start: 2020-02-05 | End: 2020-02-05 | Stop reason: HOSPADM

## 2020-02-05 RX ORDER — FENTANYL CITRATE 50 UG/ML
INJECTION, SOLUTION INTRAMUSCULAR; INTRAVENOUS PRN
Status: DISCONTINUED | OUTPATIENT
Start: 2020-02-05 | End: 2020-02-05 | Stop reason: SDUPTHER

## 2020-02-05 RX ORDER — SODIUM CHLORIDE 0.9 % (FLUSH) 0.9 %
10 SYRINGE (ML) INJECTION PRN
Status: DISCONTINUED | OUTPATIENT
Start: 2020-02-05 | End: 2020-02-07 | Stop reason: HOSPADM

## 2020-02-05 RX ORDER — ONDANSETRON 2 MG/ML
4 INJECTION INTRAMUSCULAR; INTRAVENOUS
Status: DISCONTINUED | OUTPATIENT
Start: 2020-02-05 | End: 2020-02-05 | Stop reason: HOSPADM

## 2020-02-05 RX ORDER — MIDAZOLAM HYDROCHLORIDE 1 MG/ML
INJECTION INTRAMUSCULAR; INTRAVENOUS PRN
Status: DISCONTINUED | OUTPATIENT
Start: 2020-02-05 | End: 2020-02-05 | Stop reason: SDUPTHER

## 2020-02-05 RX ORDER — ONDANSETRON 2 MG/ML
INJECTION INTRAMUSCULAR; INTRAVENOUS PRN
Status: DISCONTINUED | OUTPATIENT
Start: 2020-02-05 | End: 2020-02-05 | Stop reason: SDUPTHER

## 2020-02-05 RX ORDER — SODIUM CHLORIDE 9 MG/ML
INJECTION, SOLUTION INTRAVENOUS CONTINUOUS
Status: DISCONTINUED | OUTPATIENT
Start: 2020-02-05 | End: 2020-02-05

## 2020-02-05 RX ORDER — SODIUM CHLORIDE 0.9 % (FLUSH) 0.9 %
10 SYRINGE (ML) INJECTION PRN
Status: DISCONTINUED | OUTPATIENT
Start: 2020-02-05 | End: 2020-02-05 | Stop reason: HOSPADM

## 2020-02-05 RX ORDER — MAGNESIUM HYDROXIDE 1200 MG/15ML
LIQUID ORAL CONTINUOUS PRN
Status: COMPLETED | OUTPATIENT
Start: 2020-02-05 | End: 2020-02-05

## 2020-02-05 RX ORDER — PHENYLEPHRINE HCL IN 0.9% NACL 1 MG/10 ML
SYRINGE (ML) INTRAVENOUS PRN
Status: DISCONTINUED | OUTPATIENT
Start: 2020-02-05 | End: 2020-02-05 | Stop reason: SDUPTHER

## 2020-02-05 RX ORDER — SODIUM CHLORIDE 0.9 % (FLUSH) 0.9 %
10 SYRINGE (ML) INJECTION EVERY 12 HOURS SCHEDULED
Status: DISCONTINUED | OUTPATIENT
Start: 2020-02-05 | End: 2020-02-07 | Stop reason: HOSPADM

## 2020-02-05 RX ORDER — ACETAMINOPHEN 325 MG/1
650 TABLET ORAL EVERY 4 HOURS PRN
Status: DISCONTINUED | OUTPATIENT
Start: 2020-02-05 | End: 2020-02-07 | Stop reason: HOSPADM

## 2020-02-05 RX ORDER — EPHEDRINE SULFATE/0.9% NACL/PF 50 MG/5 ML
SYRINGE (ML) INTRAVENOUS PRN
Status: DISCONTINUED | OUTPATIENT
Start: 2020-02-05 | End: 2020-02-05 | Stop reason: SDUPTHER

## 2020-02-05 RX ORDER — DEXAMETHASONE SODIUM PHOSPHATE 4 MG/ML
INJECTION, SOLUTION INTRA-ARTICULAR; INTRALESIONAL; INTRAMUSCULAR; INTRAVENOUS; SOFT TISSUE PRN
Status: DISCONTINUED | OUTPATIENT
Start: 2020-02-05 | End: 2020-02-05 | Stop reason: SDUPTHER

## 2020-02-05 RX ORDER — CLOPIDOGREL BISULFATE 75 MG/1
75 TABLET ORAL DAILY
Status: DISCONTINUED | OUTPATIENT
Start: 2020-02-05 | End: 2020-02-07 | Stop reason: HOSPADM

## 2020-02-05 RX ORDER — FENTANYL CITRATE 50 UG/ML
25 INJECTION, SOLUTION INTRAMUSCULAR; INTRAVENOUS EVERY 5 MIN PRN
Status: DISCONTINUED | OUTPATIENT
Start: 2020-02-05 | End: 2020-02-07 | Stop reason: HOSPADM

## 2020-02-05 RX ORDER — ROCURONIUM BROMIDE 10 MG/ML
INJECTION, SOLUTION INTRAVENOUS PRN
Status: DISCONTINUED | OUTPATIENT
Start: 2020-02-05 | End: 2020-02-05 | Stop reason: SDUPTHER

## 2020-02-05 RX ORDER — FENTANYL CITRATE 50 UG/ML
25 INJECTION, SOLUTION INTRAMUSCULAR; INTRAVENOUS EVERY 5 MIN PRN
Status: DISCONTINUED | OUTPATIENT
Start: 2020-02-05 | End: 2020-02-05 | Stop reason: HOSPADM

## 2020-02-05 RX ORDER — SIMVASTATIN 40 MG
40 TABLET ORAL NIGHTLY
Status: DISCONTINUED | OUTPATIENT
Start: 2020-02-05 | End: 2020-02-07 | Stop reason: HOSPADM

## 2020-02-05 RX ORDER — OXYCODONE HYDROCHLORIDE AND ACETAMINOPHEN 5; 325 MG/1; MG/1
1 TABLET ORAL EVERY 4 HOURS PRN
Status: ON HOLD | COMMUNITY
End: 2020-02-07 | Stop reason: HOSPADM

## 2020-02-05 RX ORDER — SODIUM CHLORIDE 0.9 % (FLUSH) 0.9 %
10 SYRINGE (ML) INJECTION EVERY 12 HOURS SCHEDULED
Status: DISCONTINUED | OUTPATIENT
Start: 2020-02-05 | End: 2020-02-05 | Stop reason: HOSPADM

## 2020-02-05 RX ORDER — OXYCODONE HYDROCHLORIDE AND ACETAMINOPHEN 5; 325 MG/1; MG/1
2 TABLET ORAL EVERY 4 HOURS PRN
Status: DISCONTINUED | OUTPATIENT
Start: 2020-02-05 | End: 2020-02-07 | Stop reason: HOSPADM

## 2020-02-05 RX ORDER — AMLODIPINE BESYLATE 5 MG/1
5 TABLET ORAL DAILY
Status: DISCONTINUED | OUTPATIENT
Start: 2020-02-06 | End: 2020-02-07 | Stop reason: HOSPADM

## 2020-02-05 RX ORDER — GLYCOPYRROLATE 0.2 MG/ML
INJECTION INTRAMUSCULAR; INTRAVENOUS PRN
Status: DISCONTINUED | OUTPATIENT
Start: 2020-02-05 | End: 2020-02-05 | Stop reason: SDUPTHER

## 2020-02-05 RX ORDER — SODIUM CHLORIDE 450 MG/100ML
INJECTION, SOLUTION INTRAVENOUS CONTINUOUS
Status: DISCONTINUED | OUTPATIENT
Start: 2020-02-05 | End: 2020-02-06

## 2020-02-05 RX ORDER — CEPHALEXIN 500 MG/1
500 CAPSULE ORAL 2 TIMES DAILY
Status: ON HOLD | COMMUNITY
End: 2020-02-05

## 2020-02-05 RX ORDER — MORPHINE SULFATE 2 MG/ML
2 INJECTION, SOLUTION INTRAMUSCULAR; INTRAVENOUS EVERY 4 HOURS PRN
Status: DISCONTINUED | OUTPATIENT
Start: 2020-02-05 | End: 2020-02-07 | Stop reason: HOSPADM

## 2020-02-05 RX ORDER — APREPITANT 40 MG/1
40 CAPSULE ORAL ONCE
Status: COMPLETED | OUTPATIENT
Start: 2020-02-05 | End: 2020-02-05

## 2020-02-05 RX ORDER — ONDANSETRON 2 MG/ML
4 INJECTION INTRAMUSCULAR; INTRAVENOUS EVERY 6 HOURS PRN
Status: DISCONTINUED | OUTPATIENT
Start: 2020-02-05 | End: 2020-02-07 | Stop reason: HOSPADM

## 2020-02-05 RX ORDER — OXYCODONE HYDROCHLORIDE AND ACETAMINOPHEN 5; 325 MG/1; MG/1
2 TABLET ORAL PRN
Status: DISCONTINUED | OUTPATIENT
Start: 2020-02-05 | End: 2020-02-05 | Stop reason: HOSPADM

## 2020-02-05 RX ORDER — PROPOFOL 10 MG/ML
INJECTION, EMULSION INTRAVENOUS PRN
Status: DISCONTINUED | OUTPATIENT
Start: 2020-02-05 | End: 2020-02-05 | Stop reason: SDUPTHER

## 2020-02-05 RX ORDER — LIDOCAINE HYDROCHLORIDE 20 MG/ML
INJECTION, SOLUTION EPIDURAL; INFILTRATION; INTRACAUDAL; PERINEURAL PRN
Status: DISCONTINUED | OUTPATIENT
Start: 2020-02-05 | End: 2020-02-05 | Stop reason: SDUPTHER

## 2020-02-05 RX ORDER — HEPARIN SODIUM 10000 [USP'U]/ML
INJECTION, SOLUTION INTRAVENOUS; SUBCUTANEOUS PRN
Status: DISCONTINUED | OUTPATIENT
Start: 2020-02-05 | End: 2020-02-05 | Stop reason: SDUPTHER

## 2020-02-05 RX ADMIN — FAMOTIDINE 20 MG: 10 INJECTION, SOLUTION INTRAVENOUS at 07:40

## 2020-02-05 RX ADMIN — SIMVASTATIN 40 MG: 40 TABLET, FILM COATED ORAL at 20:21

## 2020-02-05 RX ADMIN — Medication 5 MG: at 09:57

## 2020-02-05 RX ADMIN — LIDOCAINE HYDROCHLORIDE 100 MG: 20 INJECTION, SOLUTION EPIDURAL; INFILTRATION; INTRACAUDAL; PERINEURAL at 07:34

## 2020-02-05 RX ADMIN — CLOPIDOGREL BISULFATE 75 MG: 75 TABLET ORAL at 17:42

## 2020-02-05 RX ADMIN — Medication 5 MG: at 11:50

## 2020-02-05 RX ADMIN — Medication 100 MCG: at 08:03

## 2020-02-05 RX ADMIN — OXYCODONE HYDROCHLORIDE AND ACETAMINOPHEN 2 TABLET: 5; 325 TABLET ORAL at 17:07

## 2020-02-05 RX ADMIN — Medication 200 MCG: at 07:49

## 2020-02-05 RX ADMIN — HYDROMORPHONE HYDROCHLORIDE 0.5 MG: 1 INJECTION, SOLUTION INTRAMUSCULAR; INTRAVENOUS; SUBCUTANEOUS at 14:46

## 2020-02-05 RX ADMIN — ENOXAPARIN SODIUM 40 MG: 40 INJECTION SUBCUTANEOUS at 17:42

## 2020-02-05 RX ADMIN — ROCURONIUM BROMIDE 20 MG: 10 INJECTION INTRAVENOUS at 08:07

## 2020-02-05 RX ADMIN — HYDROMORPHONE HYDROCHLORIDE 0.5 MG: 1 INJECTION, SOLUTION INTRAMUSCULAR; INTRAVENOUS; SUBCUTANEOUS at 14:28

## 2020-02-05 RX ADMIN — GLYCOPYRROLATE 0.2 MG: 0.2 INJECTION, SOLUTION INTRAMUSCULAR; INTRAVENOUS at 07:29

## 2020-02-05 RX ADMIN — ONDANSETRON 4 MG: 2 INJECTION INTRAMUSCULAR; INTRAVENOUS at 15:59

## 2020-02-05 RX ADMIN — CEFAZOLIN 2 G: 1 INJECTION, POWDER, FOR SOLUTION INTRAMUSCULAR; INTRAVENOUS at 18:02

## 2020-02-05 RX ADMIN — PHENYLEPHRINE HYDROCHLORIDE 25 MCG/MIN: 10 INJECTION INTRAVENOUS at 08:11

## 2020-02-05 RX ADMIN — Medication 5 MG: at 10:34

## 2020-02-05 RX ADMIN — HYDROMORPHONE HYDROCHLORIDE 0.5 MG: 1 INJECTION, SOLUTION INTRAMUSCULAR; INTRAVENOUS; SUBCUTANEOUS at 13:22

## 2020-02-05 RX ADMIN — Medication 100 MCG: at 08:19

## 2020-02-05 RX ADMIN — SODIUM CHLORIDE: 9 INJECTION, SOLUTION INTRAVENOUS at 10:00

## 2020-02-05 RX ADMIN — METOPROLOL TARTRATE 25 MG: 25 TABLET ORAL at 20:21

## 2020-02-05 RX ADMIN — ONDANSETRON 4 MG: 2 INJECTION INTRAMUSCULAR; INTRAVENOUS at 07:40

## 2020-02-05 RX ADMIN — Medication 5 MG: at 09:48

## 2020-02-05 RX ADMIN — SODIUM CHLORIDE: 9 INJECTION, SOLUTION INTRAVENOUS at 10:01

## 2020-02-05 RX ADMIN — APREPITANT 40 MG: 40 CAPSULE ORAL at 06:49

## 2020-02-05 RX ADMIN — SODIUM CHLORIDE: 9 INJECTION, SOLUTION INTRAVENOUS at 07:29

## 2020-02-05 RX ADMIN — Medication 10 ML: at 20:22

## 2020-02-05 RX ADMIN — DEXAMETHASONE SODIUM PHOSPHATE 8 MG: 4 INJECTION, SOLUTION INTRAMUSCULAR; INTRAVENOUS at 11:40

## 2020-02-05 RX ADMIN — SODIUM CHLORIDE: 4.5 INJECTION, SOLUTION INTRAVENOUS at 17:58

## 2020-02-05 RX ADMIN — HYDROMORPHONE HYDROCHLORIDE 0.5 MG: 1 INJECTION, SOLUTION INTRAMUSCULAR; INTRAVENOUS; SUBCUTANEOUS at 12:43

## 2020-02-05 RX ADMIN — CEFAZOLIN 2 G: 1 INJECTION, POWDER, FOR SOLUTION INTRAMUSCULAR; INTRAVENOUS at 11:15

## 2020-02-05 RX ADMIN — Medication 5 MG: at 11:41

## 2020-02-05 RX ADMIN — HYDROMORPHONE HYDROCHLORIDE 0.5 MG: 1 INJECTION, SOLUTION INTRAMUSCULAR; INTRAVENOUS; SUBCUTANEOUS at 14:55

## 2020-02-05 RX ADMIN — Medication 5 MG: at 09:00

## 2020-02-05 RX ADMIN — Medication 5 MG: at 10:45

## 2020-02-05 RX ADMIN — Medication 5 MG: at 12:03

## 2020-02-05 RX ADMIN — CEFAZOLIN 2 G: 1 INJECTION, POWDER, FOR SOLUTION INTRAMUSCULAR; INTRAVENOUS at 07:29

## 2020-02-05 RX ADMIN — FENTANYL CITRATE 50 MCG: 50 INJECTION INTRAMUSCULAR; INTRAVENOUS at 07:34

## 2020-02-05 RX ADMIN — SODIUM CHLORIDE: 9 INJECTION, SOLUTION INTRAVENOUS at 06:47

## 2020-02-05 RX ADMIN — SODIUM CHLORIDE: 9 INJECTION, SOLUTION INTRAVENOUS at 12:00

## 2020-02-05 RX ADMIN — Medication 5 MG: at 08:16

## 2020-02-05 RX ADMIN — Medication 5 MG: at 09:15

## 2020-02-05 RX ADMIN — MIDAZOLAM 2 MG: 1 INJECTION INTRAMUSCULAR; INTRAVENOUS at 07:23

## 2020-02-05 RX ADMIN — OXYCODONE HYDROCHLORIDE AND ACETAMINOPHEN 2 TABLET: 5; 325 TABLET ORAL at 21:22

## 2020-02-05 RX ADMIN — HYDROMORPHONE HYDROCHLORIDE 0.5 MG: 1 INJECTION, SOLUTION INTRAMUSCULAR; INTRAVENOUS; SUBCUTANEOUS at 14:36

## 2020-02-05 RX ADMIN — MORPHINE SULFATE 2 MG: 2 INJECTION, SOLUTION INTRAMUSCULAR; INTRAVENOUS at 18:23

## 2020-02-05 RX ADMIN — PROPOFOL 200 MG: 10 INJECTION, EMULSION INTRAVENOUS at 07:34

## 2020-02-05 RX ADMIN — FENTANYL CITRATE 50 MCG: 50 INJECTION INTRAMUSCULAR; INTRAVENOUS at 08:09

## 2020-02-05 RX ADMIN — HEPARIN SODIUM 6000 UNITS: 10000 INJECTION, SOLUTION INTRAVENOUS; SUBCUTANEOUS at 11:23

## 2020-02-05 ASSESSMENT — PULMONARY FUNCTION TESTS
PIF_VALUE: 8
PIF_VALUE: 5
PIF_VALUE: 11
PIF_VALUE: 8
PIF_VALUE: 24
PIF_VALUE: 18
PIF_VALUE: 4
PIF_VALUE: 17
PIF_VALUE: 15
PIF_VALUE: 5
PIF_VALUE: 17
PIF_VALUE: 18
PIF_VALUE: 17
PIF_VALUE: 19
PIF_VALUE: 16
PIF_VALUE: 4
PIF_VALUE: 7
PIF_VALUE: 8
PIF_VALUE: 17
PIF_VALUE: 18
PIF_VALUE: 15
PIF_VALUE: 21
PIF_VALUE: 17
PIF_VALUE: 2
PIF_VALUE: 20
PIF_VALUE: 15
PIF_VALUE: 17
PIF_VALUE: 17
PIF_VALUE: 8
PIF_VALUE: 18
PIF_VALUE: 17
PIF_VALUE: 18
PIF_VALUE: 19
PIF_VALUE: 15
PIF_VALUE: 19
PIF_VALUE: 20
PIF_VALUE: 1
PIF_VALUE: 6
PIF_VALUE: 6
PIF_VALUE: 20
PIF_VALUE: 2
PIF_VALUE: 7
PIF_VALUE: 17
PIF_VALUE: 20
PIF_VALUE: 7
PIF_VALUE: 19
PIF_VALUE: 18
PIF_VALUE: 19
PIF_VALUE: 17
PIF_VALUE: 15
PIF_VALUE: 7
PIF_VALUE: 17
PIF_VALUE: 19
PIF_VALUE: 17
PIF_VALUE: 18
PIF_VALUE: 18
PIF_VALUE: 8
PIF_VALUE: 18
PIF_VALUE: 18
PIF_VALUE: 16
PIF_VALUE: 7
PIF_VALUE: 6
PIF_VALUE: 17
PIF_VALUE: 7
PIF_VALUE: 16
PIF_VALUE: 14
PIF_VALUE: 15
PIF_VALUE: 17
PIF_VALUE: 2
PIF_VALUE: 21
PIF_VALUE: 18
PIF_VALUE: 20
PIF_VALUE: 17
PIF_VALUE: 8
PIF_VALUE: 18
PIF_VALUE: 5
PIF_VALUE: 7
PIF_VALUE: 18
PIF_VALUE: 20
PIF_VALUE: 18
PIF_VALUE: 7
PIF_VALUE: 14
PIF_VALUE: 19
PIF_VALUE: 8
PIF_VALUE: 17
PIF_VALUE: 18
PIF_VALUE: 15
PIF_VALUE: 17
PIF_VALUE: 16
PIF_VALUE: 7
PIF_VALUE: 18
PIF_VALUE: 14
PIF_VALUE: 18
PIF_VALUE: 17
PIF_VALUE: 15
PIF_VALUE: 2
PIF_VALUE: 20
PIF_VALUE: 18
PIF_VALUE: 18
PIF_VALUE: 16
PIF_VALUE: 15
PIF_VALUE: 17
PIF_VALUE: 17
PIF_VALUE: 20
PIF_VALUE: 7
PIF_VALUE: 8
PIF_VALUE: 16
PIF_VALUE: 3
PIF_VALUE: 19
PIF_VALUE: 18
PIF_VALUE: 17
PIF_VALUE: 17
PIF_VALUE: 2
PIF_VALUE: 25
PIF_VALUE: 19
PIF_VALUE: 17
PIF_VALUE: 7
PIF_VALUE: 20
PIF_VALUE: 15
PIF_VALUE: 18
PIF_VALUE: 5
PIF_VALUE: 18
PIF_VALUE: 15
PIF_VALUE: 17
PIF_VALUE: 18
PIF_VALUE: 17
PIF_VALUE: 7
PIF_VALUE: 0
PIF_VALUE: 3
PIF_VALUE: 7
PIF_VALUE: 7
PIF_VALUE: 20
PIF_VALUE: 15
PIF_VALUE: 17
PIF_VALUE: 7
PIF_VALUE: 16
PIF_VALUE: 8
PIF_VALUE: 13
PIF_VALUE: 0
PIF_VALUE: 7
PIF_VALUE: 7
PIF_VALUE: 18
PIF_VALUE: 17
PIF_VALUE: 15
PIF_VALUE: 20
PIF_VALUE: 7
PIF_VALUE: 6
PIF_VALUE: 18
PIF_VALUE: 17
PIF_VALUE: 6
PIF_VALUE: 17
PIF_VALUE: 17
PIF_VALUE: 20
PIF_VALUE: 4
PIF_VALUE: 6
PIF_VALUE: 17
PIF_VALUE: 15
PIF_VALUE: 20
PIF_VALUE: 2
PIF_VALUE: 14
PIF_VALUE: 18
PIF_VALUE: 6
PIF_VALUE: 7
PIF_VALUE: 0
PIF_VALUE: 0
PIF_VALUE: 7
PIF_VALUE: 19
PIF_VALUE: 7
PIF_VALUE: 15
PIF_VALUE: 20
PIF_VALUE: 8
PIF_VALUE: 8
PIF_VALUE: 5
PIF_VALUE: 8
PIF_VALUE: 7
PIF_VALUE: 19
PIF_VALUE: 18
PIF_VALUE: 7
PIF_VALUE: 4
PIF_VALUE: 18
PIF_VALUE: 19
PIF_VALUE: 17
PIF_VALUE: 16
PIF_VALUE: 17
PIF_VALUE: 4
PIF_VALUE: 17
PIF_VALUE: 7
PIF_VALUE: 11
PIF_VALUE: 19
PIF_VALUE: 18
PIF_VALUE: 7
PIF_VALUE: 17
PIF_VALUE: 16
PIF_VALUE: 20
PIF_VALUE: 2
PIF_VALUE: 8
PIF_VALUE: 16
PIF_VALUE: 19
PIF_VALUE: 16
PIF_VALUE: 7
PIF_VALUE: 18
PIF_VALUE: 19
PIF_VALUE: 17
PIF_VALUE: 6
PIF_VALUE: 16
PIF_VALUE: 7
PIF_VALUE: 20
PIF_VALUE: 20
PIF_VALUE: 6
PIF_VALUE: 6
PIF_VALUE: 17
PIF_VALUE: 27
PIF_VALUE: 7
PIF_VALUE: 8
PIF_VALUE: 16
PIF_VALUE: 20
PIF_VALUE: 19
PIF_VALUE: 14
PIF_VALUE: 20
PIF_VALUE: 15
PIF_VALUE: 18
PIF_VALUE: 16
PIF_VALUE: 7
PIF_VALUE: 22
PIF_VALUE: 19
PIF_VALUE: 17
PIF_VALUE: 17
PIF_VALUE: 19
PIF_VALUE: 17
PIF_VALUE: 15
PIF_VALUE: 12
PIF_VALUE: 7
PIF_VALUE: 6
PIF_VALUE: 18
PIF_VALUE: 17
PIF_VALUE: 17
PIF_VALUE: 18
PIF_VALUE: 19
PIF_VALUE: 19
PIF_VALUE: 4
PIF_VALUE: 20
PIF_VALUE: 18
PIF_VALUE: 7
PIF_VALUE: 18
PIF_VALUE: 8
PIF_VALUE: 14
PIF_VALUE: 5
PIF_VALUE: 17
PIF_VALUE: 18
PIF_VALUE: 18
PIF_VALUE: 20
PIF_VALUE: 18
PIF_VALUE: 7
PIF_VALUE: 20
PIF_VALUE: 6
PIF_VALUE: 19
PIF_VALUE: 20
PIF_VALUE: 20
PIF_VALUE: 7
PIF_VALUE: 8
PIF_VALUE: 17
PIF_VALUE: 14
PIF_VALUE: 20
PIF_VALUE: 7
PIF_VALUE: 7
PIF_VALUE: 14
PIF_VALUE: 20
PIF_VALUE: 17
PIF_VALUE: 16
PIF_VALUE: 17
PIF_VALUE: 7
PIF_VALUE: 7
PIF_VALUE: 8
PIF_VALUE: 17
PIF_VALUE: 7
PIF_VALUE: 8
PIF_VALUE: 7
PIF_VALUE: 19
PIF_VALUE: 0
PIF_VALUE: 16
PIF_VALUE: 19
PIF_VALUE: 19
PIF_VALUE: 16
PIF_VALUE: 17
PIF_VALUE: 18
PIF_VALUE: 18
PIF_VALUE: 6
PIF_VALUE: 17
PIF_VALUE: 4
PIF_VALUE: 18
PIF_VALUE: 18
PIF_VALUE: 15
PIF_VALUE: 17
PIF_VALUE: 18
PIF_VALUE: 16
PIF_VALUE: 17
PIF_VALUE: 20
PIF_VALUE: 17
PIF_VALUE: 0
PIF_VALUE: 16
PIF_VALUE: 19
PIF_VALUE: 7
PIF_VALUE: 4
PIF_VALUE: 16
PIF_VALUE: 15
PIF_VALUE: 20
PIF_VALUE: 18
PIF_VALUE: 15
PIF_VALUE: 15
PIF_VALUE: 5
PIF_VALUE: 17
PIF_VALUE: 7
PIF_VALUE: 18
PIF_VALUE: 0
PIF_VALUE: 8
PIF_VALUE: 12
PIF_VALUE: 8
PIF_VALUE: 15
PIF_VALUE: 0
PIF_VALUE: 19
PIF_VALUE: 11
PIF_VALUE: 8
PIF_VALUE: 17
PIF_VALUE: 7
PIF_VALUE: 18
PIF_VALUE: 7
PIF_VALUE: 17
PIF_VALUE: 19
PIF_VALUE: 20
PIF_VALUE: 6
PIF_VALUE: 7
PIF_VALUE: 17
PIF_VALUE: 20
PIF_VALUE: 6
PIF_VALUE: 0
PIF_VALUE: 19
PIF_VALUE: 18
PIF_VALUE: 4
PIF_VALUE: 20
PIF_VALUE: 17
PIF_VALUE: 17
PIF_VALUE: 19
PIF_VALUE: 14
PIF_VALUE: 15
PIF_VALUE: 15
PIF_VALUE: 17
PIF_VALUE: 6
PIF_VALUE: 0
PIF_VALUE: 7
PIF_VALUE: 17
PIF_VALUE: 17
PIF_VALUE: 7
PIF_VALUE: 16
PIF_VALUE: 7
PIF_VALUE: 18
PIF_VALUE: 17
PIF_VALUE: 15
PIF_VALUE: 6
PIF_VALUE: 8
PIF_VALUE: 19
PIF_VALUE: 18
PIF_VALUE: 7
PIF_VALUE: 15
PIF_VALUE: 7
PIF_VALUE: 18
PIF_VALUE: 16
PIF_VALUE: 7
PIF_VALUE: 17
PIF_VALUE: 18
PIF_VALUE: 18
PIF_VALUE: 19
PIF_VALUE: 18
PIF_VALUE: 4
PIF_VALUE: 0
PIF_VALUE: 15
PIF_VALUE: 4
PIF_VALUE: 18
PIF_VALUE: 16
PIF_VALUE: 20
PIF_VALUE: 17
PIF_VALUE: 8
PIF_VALUE: 18
PIF_VALUE: 19
PIF_VALUE: 14
PIF_VALUE: 7
PIF_VALUE: 8

## 2020-02-05 ASSESSMENT — PAIN DESCRIPTION - ORIENTATION
ORIENTATION: LEFT

## 2020-02-05 ASSESSMENT — PAIN DESCRIPTION - DESCRIPTORS
DESCRIPTORS: ACHING;THROBBING
DESCRIPTORS: CONSTANT
DESCRIPTORS: CONSTANT
DESCRIPTORS: ACHING;THROBBING;BURNING
DESCRIPTORS: THROBBING;BURNING;ACHING
DESCRIPTORS: ACHING

## 2020-02-05 ASSESSMENT — PAIN - FUNCTIONAL ASSESSMENT
PAIN_FUNCTIONAL_ASSESSMENT: 0-10
PAIN_FUNCTIONAL_ASSESSMENT: PREVENTS OR INTERFERES SOME ACTIVE ACTIVITIES AND ADLS

## 2020-02-05 ASSESSMENT — PAIN DESCRIPTION - LOCATION
LOCATION: ARM;LEG;FOOT
LOCATION: ARM;LEG;FOOT
LOCATION: ARM;FOOT;LEG
LOCATION: LEG
LOCATION: LEG
LOCATION: ARM;FOOT

## 2020-02-05 ASSESSMENT — PAIN SCALES - GENERAL
PAINLEVEL_OUTOF10: 8
PAINLEVEL_OUTOF10: 6
PAINLEVEL_OUTOF10: 7
PAINLEVEL_OUTOF10: 0
PAINLEVEL_OUTOF10: 8
PAINLEVEL_OUTOF10: 8
PAINLEVEL_OUTOF10: 6
PAINLEVEL_OUTOF10: 6
PAINLEVEL_OUTOF10: 8
PAINLEVEL_OUTOF10: 6
PAINLEVEL_OUTOF10: 7
PAINLEVEL_OUTOF10: 6
PAINLEVEL_OUTOF10: 8

## 2020-02-05 ASSESSMENT — ENCOUNTER SYMPTOMS: SHORTNESS OF BREATH: 1

## 2020-02-05 ASSESSMENT — PAIN DESCRIPTION - ONSET
ONSET: AWAKENED FROM SLEEP
ONSET: ON-GOING

## 2020-02-05 ASSESSMENT — PAIN DESCRIPTION - PROGRESSION
CLINICAL_PROGRESSION: NOT CHANGED
CLINICAL_PROGRESSION: GRADUALLY WORSENING
CLINICAL_PROGRESSION: NOT CHANGED
CLINICAL_PROGRESSION: NOT CHANGED
CLINICAL_PROGRESSION: GRADUALLY WORSENING

## 2020-02-05 ASSESSMENT — PAIN DESCRIPTION - FREQUENCY
FREQUENCY: CONTINUOUS

## 2020-02-05 ASSESSMENT — PAIN DESCRIPTION - PAIN TYPE
TYPE: SURGICAL PAIN

## 2020-02-05 ASSESSMENT — LIFESTYLE VARIABLES: SMOKING_STATUS: 0

## 2020-02-05 NOTE — PROGRESS NOTES
Pt alert and oriented, vitals within normal limits. Pt's wife at bedside. Pt's pain 10/10, states it gets worse when he is up walking. Pain is described as throbbing. Left leg is red, iwona. Left toes dark, appear necrotic. Able to move left foot, not full ROM. Pt hit shin on coffee table a few months ago, still has non-healed wound- covered by dressing. Belongings taken to PACU.

## 2020-02-05 NOTE — ANESTHESIA PRE PROCEDURE
atherectomy/angioplasty of native arteries    VASCULAR SURGERY Left 2011    IR FEMORAL POPLITEAL BYPASS GRAFT      Social History     Tobacco Use    Smoking status: Former Smoker     Packs/day: 0.25     Years: 25.00     Pack years: 6.25     Types: Cigarettes     Last attempt to quit: 3/5/2014     Years since quittin.9    Smokeless tobacco: Never Used   Substance Use Topics    Alcohol use: Yes     Alcohol/week: 48.0 standard drinks     Types: 48 Cans of beer per week     Comment: patient states drinks at least 8 beers/day    Drug use: Never     Medications  No current facility-administered medications on file prior to encounter. Current Outpatient Medications on File Prior to Encounter   Medication Sig Dispense Refill    oxyCODONE-acetaminophen (PERCOCET) 5-325 MG per tablet Take 1 tablet by mouth every 4 hours as needed for Pain.  cephALEXin (KEFLEX) 500 MG capsule Take 500 mg by mouth 2 times daily      metoprolol tartrate (LOPRESSOR) 25 MG tablet TAKE 1 TABLET BY MOUTH TWICE A  tablet 3    clopidogrel (PLAVIX) 75 MG tablet TAKE 1 TABLET BY MOUTH EVERY DAY 90 tablet 3    amLODIPine (NORVASC) 5 MG tablet TAKE 1 TABLET BY MOUTH EVERY DAY 90 tablet 3    simvastatin (ZOCOR) 40 MG tablet TAKE 1 TABLET NIGHTLY 90 tablet 3    aspirin 325 MG tablet Take 325 mg by mouth daily.         nitroGLYCERIN (NITROSTAT) 0.4 MG SL tablet Place 1 tablet under the tongue every 5 minutes as needed for Chest pain 25 tablet 3     Current Facility-Administered Medications   Medication Dose Route Frequency Provider Last Rate Last Dose    ceFAZolin (ANCEF) 2 g in dextrose 5 % 100 mL IVPB  2 g Intravenous Once Jacqueline Bustillo MD        0.9 % sodium chloride infusion   Intravenous Continuous Darby Li MD        sodium chloride flush 0.9 % injection 10 mL  10 mL Intravenous 2 times per day Darby Li MD        sodium chloride flush 0.9 % injection 10 mL  10 mL Intravenous PRN Royal Julio C MD         Vital Signs (Current)   Vitals:    20 1510 20   BP:  (!) 152/83   Pulse:  74   Resp:  17   Temp:  97.5 °F (36.4 °C)   TempSrc:  Oral   SpO2:  99%   Weight: 170 lb (77.1 kg) 171 lb 13.6 oz (78 kg)   Height: 6' (1.829 m) 6' (1.829 m)                                          BP Readings from Last 3 Encounters:   20 (!) 152/83   20 (!) 166/90   19 130/80     Vital Signs Statistics (for past 48 hrs)     Temp  Av.5 °F (36.4 °C)  Min: 97.5 °F (36.4 °C)   Min taken time: 20  Max: 97.5 °F (36.4 °C)   Max taken time: 20  Pulse  Av  Min: 74   Min taken time: 20 0614  Max: 76   Max taken time: 2014  Resp  Av  Min: 16   Min taken time: 2014  Max: 16   Max taken time: 2014  BP  Min: 152/83   Min taken time: 2014  Max: 152/83   Max taken time: 20  SpO2  Av %  Min: 99 %   Min taken time: 20  Max: 99 %   Max taken time: 20  BP Readings from Last 3 Encounters:   20 (!) 152/83   20 (!) 166/90   19 130/80       BMI  Body mass index is 23.31 kg/m². Estimated body mass index is 23.31 kg/m² as calculated from the following:    Height as of this encounter: 6' (1.829 m). Weight as of this encounter: 171 lb 13.6 oz (78 kg).     CBC   Lab Results   Component Value Date    WBC 7.5 10/09/2018    RBC 4.15 10/09/2018    HGB 14.4 10/09/2018    HCT 41.7 10/09/2018    .4 10/09/2018    RDW 13.0 10/09/2018     10/09/2018     CMP    Lab Results   Component Value Date     2019    K 4.0 2019    CL 99 2019    CO2 28.0 2019    BUN 8 2019    CREATININE 0.90 2019    GFRAA >90 2019    GFRAA >60 2011    AGRATIO 1.6 2011    LABGLOM 90 2019    GLUCOSE 109 2019    PROT 7.8 2019    CALCIUM 9.1 2019    CALCIUM 9.3 2019    BILITOT 0.5 2019    ALKPHOS 94 04/06/2019     04/06/2019     04/06/2019     BMP    Lab Results   Component Value Date     04/06/2019    K 4.0 04/06/2019    CL 99 04/06/2019    CO2 28.0 04/06/2019    BUN 8 04/06/2019    CREATININE 0.90 04/06/2019    CALCIUM 9.1 04/06/2019    CALCIUM 9.3 04/06/2019    GFRAA >90 04/06/2019    GFRAA >60 11/23/2011    LABGLOM 90 04/06/2019    GLUCOSE 109 04/06/2019     POCGlucose  No results for input(s): GLUCOSE in the last 72 hours. Coags    Lab Results   Component Value Date    PROTIME 12.0 03/15/2014    INR 1.07 03/15/2014    APTT 66.0 14/12/1008     HCG (If Applicable) No results found for: PREGTESTUR, PREGSERUM, HCG, HCGQUANT   ABGs   Lab Results   Component Value Date    PHART 7.334 03/11/2014    PO2ART 96 03/11/2014    NDP8GEN 44 03/11/2014    NYE6JZB 23.6 03/11/2014    BEART -3 03/11/2014    Z2GZKQJQ 97 03/11/2014      Type & Screen (If Applicable)  Lab Results   Component Value Date    LABABO O 07/20/2011    LABRH Positive 07/20/2011                            BMI: Wt Readings from Last 3 Encounters:       NPO Status:                          Anesthesia Evaluation  Patient summary reviewed   history of anesthetic complications: PONV. Airway: Mallampati: III  TM distance: >3 FB   Neck ROM: full  Mouth opening: < 3 FB Dental:          Pulmonary:   (+) shortness of breath:      (-) not a current smoker                           Cardiovascular:    (+) hypertension:, past MI:, CAD:, hyperlipidemia            Echocardiogram reviewed  Stress test reviewed                Neuro/Psych:   (+) psychiatric history:   (-) seizures and CVA           GI/Hepatic/Renal:   (+) GERD:,      (-) liver disease, no renal disease and bowel prep (heavy EtOH)       Endo/Other:        (-) diabetes mellitus, hypothyroidism, hyperthyroidism               Abdominal:           Vascular:   + PVD, aortic or cerebral, .  - DVT and PE.                                 Anesthesia Plan      general     ASA 3       Induction:

## 2020-02-06 LAB
ANION GAP SERPL CALCULATED.3IONS-SCNC: 13 MMOL/L (ref 3–16)
BASOPHILS ABSOLUTE: 0 K/UL (ref 0–0.2)
BASOPHILS RELATIVE PERCENT: 0.1 %
BUN BLDV-MCNC: 10 MG/DL (ref 7–20)
CALCIUM SERPL-MCNC: 8.4 MG/DL (ref 8.3–10.6)
CHLORIDE BLD-SCNC: 96 MMOL/L (ref 99–110)
CO2: 24 MMOL/L (ref 21–32)
CREAT SERPL-MCNC: 0.8 MG/DL (ref 0.9–1.3)
EOSINOPHILS ABSOLUTE: 0 K/UL (ref 0–0.6)
EOSINOPHILS RELATIVE PERCENT: 0 %
GFR AFRICAN AMERICAN: >60
GFR NON-AFRICAN AMERICAN: >60
GLUCOSE BLD-MCNC: 168 MG/DL (ref 70–99)
HCT VFR BLD CALC: 34.5 % (ref 40.5–52.5)
HEMOGLOBIN: 11.9 G/DL (ref 13.5–17.5)
LYMPHOCYTES ABSOLUTE: 0.5 K/UL (ref 1–5.1)
LYMPHOCYTES RELATIVE PERCENT: 5.3 %
MCH RBC QN AUTO: 34.1 PG (ref 26–34)
MCHC RBC AUTO-ENTMCNC: 34.6 G/DL (ref 31–36)
MCV RBC AUTO: 98.6 FL (ref 80–100)
MONOCYTES ABSOLUTE: 0.6 K/UL (ref 0–1.3)
MONOCYTES RELATIVE PERCENT: 6 %
NEUTROPHILS ABSOLUTE: 8.9 K/UL (ref 1.7–7.7)
NEUTROPHILS RELATIVE PERCENT: 88.6 %
PDW BLD-RTO: 12.7 % (ref 12.4–15.4)
PLATELET # BLD: 172 K/UL (ref 135–450)
PMV BLD AUTO: 6.6 FL (ref 5–10.5)
POTASSIUM SERPL-SCNC: 4.4 MMOL/L (ref 3.5–5.1)
RBC # BLD: 3.5 M/UL (ref 4.2–5.9)
SODIUM BLD-SCNC: 133 MMOL/L (ref 136–145)
WBC # BLD: 10.1 K/UL (ref 4–11)

## 2020-02-06 PROCEDURE — 97530 THERAPEUTIC ACTIVITIES: CPT

## 2020-02-06 PROCEDURE — 97116 GAIT TRAINING THERAPY: CPT

## 2020-02-06 PROCEDURE — 94760 N-INVAS EAR/PLS OXIMETRY 1: CPT

## 2020-02-06 PROCEDURE — 2580000003 HC RX 258: Performed by: INTERNAL MEDICINE

## 2020-02-06 PROCEDURE — 97166 OT EVAL MOD COMPLEX 45 MIN: CPT

## 2020-02-06 PROCEDURE — 97535 SELF CARE MNGMENT TRAINING: CPT

## 2020-02-06 PROCEDURE — 80048 BASIC METABOLIC PNL TOTAL CA: CPT

## 2020-02-06 PROCEDURE — 99024 POSTOP FOLLOW-UP VISIT: CPT | Performed by: SURGERY

## 2020-02-06 PROCEDURE — 6370000000 HC RX 637 (ALT 250 FOR IP): Performed by: SURGERY

## 2020-02-06 PROCEDURE — 2500000003 HC RX 250 WO HCPCS: Performed by: INTERNAL MEDICINE

## 2020-02-06 PROCEDURE — 85025 COMPLETE CBC W/AUTO DIFF WBC: CPT

## 2020-02-06 PROCEDURE — 2580000003 HC RX 258: Performed by: SURGERY

## 2020-02-06 PROCEDURE — 36415 COLL VENOUS BLD VENIPUNCTURE: CPT

## 2020-02-06 PROCEDURE — 6360000002 HC RX W HCPCS: Performed by: INTERNAL MEDICINE

## 2020-02-06 PROCEDURE — 1200000000 HC SEMI PRIVATE

## 2020-02-06 PROCEDURE — 6360000002 HC RX W HCPCS: Performed by: SURGERY

## 2020-02-06 PROCEDURE — 97162 PT EVAL MOD COMPLEX 30 MIN: CPT

## 2020-02-06 RX ADMIN — CLOPIDOGREL BISULFATE 75 MG: 75 TABLET ORAL at 08:52

## 2020-02-06 RX ADMIN — METOPROLOL TARTRATE 25 MG: 25 TABLET ORAL at 08:53

## 2020-02-06 RX ADMIN — CEFAZOLIN 2 G: 1 INJECTION, POWDER, FOR SOLUTION INTRAMUSCULAR; INTRAVENOUS at 03:19

## 2020-02-06 RX ADMIN — OXYCODONE HYDROCHLORIDE AND ACETAMINOPHEN 2 TABLET: 5; 325 TABLET ORAL at 08:53

## 2020-02-06 RX ADMIN — OXYCODONE HYDROCHLORIDE AND ACETAMINOPHEN 2 TABLET: 5; 325 TABLET ORAL at 17:03

## 2020-02-06 RX ADMIN — CEFAZOLIN 2 G: 1 INJECTION, POWDER, FOR SOLUTION INTRAMUSCULAR; INTRAVENOUS at 18:14

## 2020-02-06 RX ADMIN — Medication 10 ML: at 20:02

## 2020-02-06 RX ADMIN — FOLIC ACID: 5 INJECTION, SOLUTION INTRAMUSCULAR; INTRAVENOUS; SUBCUTANEOUS at 12:48

## 2020-02-06 RX ADMIN — SIMVASTATIN 40 MG: 40 TABLET, FILM COATED ORAL at 20:01

## 2020-02-06 RX ADMIN — ENOXAPARIN SODIUM 40 MG: 40 INJECTION SUBCUTANEOUS at 08:53

## 2020-02-06 RX ADMIN — METOPROLOL TARTRATE 25 MG: 25 TABLET ORAL at 20:01

## 2020-02-06 RX ADMIN — OXYCODONE HYDROCHLORIDE AND ACETAMINOPHEN 2 TABLET: 5; 325 TABLET ORAL at 03:23

## 2020-02-06 RX ADMIN — CEFAZOLIN 2 G: 1 INJECTION, POWDER, FOR SOLUTION INTRAMUSCULAR; INTRAVENOUS at 11:05

## 2020-02-06 RX ADMIN — AMLODIPINE BESYLATE 5 MG: 5 TABLET ORAL at 08:53

## 2020-02-06 ASSESSMENT — PAIN - FUNCTIONAL ASSESSMENT
PAIN_FUNCTIONAL_ASSESSMENT: PREVENTS OR INTERFERES SOME ACTIVE ACTIVITIES AND ADLS
PAIN_FUNCTIONAL_ASSESSMENT: ACTIVITIES ARE NOT PREVENTED

## 2020-02-06 ASSESSMENT — PAIN DESCRIPTION - LOCATION
LOCATION: ARM;FOOT;LEG
LOCATION: ARM;FOOT;LEG

## 2020-02-06 ASSESSMENT — PAIN SCALES - GENERAL
PAINLEVEL_OUTOF10: 6
PAINLEVEL_OUTOF10: 0
PAINLEVEL_OUTOF10: 5
PAINLEVEL_OUTOF10: 3
PAINLEVEL_OUTOF10: 4
PAINLEVEL_OUTOF10: 3
PAINLEVEL_OUTOF10: 4

## 2020-02-06 ASSESSMENT — PAIN DESCRIPTION - PAIN TYPE
TYPE: SURGICAL PAIN
TYPE: SURGICAL PAIN

## 2020-02-06 ASSESSMENT — PAIN DESCRIPTION - PROGRESSION
CLINICAL_PROGRESSION: GRADUALLY IMPROVING
CLINICAL_PROGRESSION: GRADUALLY IMPROVING

## 2020-02-06 ASSESSMENT — PAIN DESCRIPTION - ONSET
ONSET: ON-GOING
ONSET: ON-GOING

## 2020-02-06 ASSESSMENT — PAIN DESCRIPTION - DESCRIPTORS: DESCRIPTORS: ACHING

## 2020-02-06 ASSESSMENT — PAIN DESCRIPTION - ORIENTATION
ORIENTATION: LEFT
ORIENTATION: LEFT

## 2020-02-06 ASSESSMENT — PAIN DESCRIPTION - FREQUENCY
FREQUENCY: CONTINUOUS
FREQUENCY: CONTINUOUS

## 2020-02-06 NOTE — PLAN OF CARE
Problem: Risk for Impaired Skin Integrity  Goal: Tissue integrity - skin and mucous membranes  Description  Structural intactness and normal physiological function of skin and  mucous membranes. Outcome: Ongoing  Note:   Reposition was performed every two hours and PRN basis. Skin care was provided. Surgical incision in his LUE and LLE covered with gauze,no secretion,no bleeding. Problem: Falls - Risk of:  Goal: Will remain free from falls  Description  Will remain free from falls  Outcome: Ongoing  Note:   Side rails up x 3. Bed locked and low position. Falling star program remains in place. Call light and personal belongings within reach. Frequent visual monitoring continues. Toileting program inplace. Patient assisted in turning/repositioning at least once every 2 hours, and on a prn basis.

## 2020-02-06 NOTE — OP NOTE
ultrasound under anesthesia and his arm looked to be maybe  long enough by itself that is our plan is. OPERATIVE PROCEDURE:  The patient was placed on the table in the supine  position, prepped and draped in the usual sterile fashion, the arm and  the leg. Antibiotics were given. Compression boot was on the other  leg. We started with the arm where we had marked it with the  ultrasound. We marked the lesser saphenous with the ultrasound. We  marked the cephalic vein. We started above the elbow, got down to the  vein, extended it all the way up into the shoulder, followed the  cephalic vein down to where it started to go into the muscle and  further. Then on the other end, we followed it in the forearm all the  way across to the wrist and we dissected it free, ligated tributaries  and then eventually divided it distally, worked it and removed it from  the hand across the elbow all the way up to the shoulder. We flushed it  with heparinized saline, put it in heparinized saline bucket and then  dried the wound and closed with 3-0 and 4-0 Vicryl followed by the glue  and tape closure. In the meantime, we went to the groin. We had marked the deep femoral_____ where  this started, he is complicated in that he has stents everywhere and we  planned on not clamping the common femoral artery. We dissected out the  common femoral, deep femoral, there were two of them, and superficial  femoral as well as the old saphenous vein itself. We got control of  everything, then dissected and put a wet sponge in it and went down to  the old incision below the knee and we had followed the posterior tibial  up from the ankle with the ultrasound knowing we tried to get in high so  that the vein would reach. We dissected down through the skin and  subcutaneous fatty tissue, took the muscle off the bone, identified the  posterior tibial artery, divided some veins that were in the way and  then left it there.     We then tunneled with the Phil tunneler from the knee in the subcu  and then down into the muscle in the groin up to the groin. We then  reversed the vein, hooked it to the tunneler, pulled it through the  tunneler, pulled the tunneler out, leaving enough for them to do  anastomosis on both sides. This worked well. We heparinized and waited  3 minutes, and we clamped the deep branches_____ and tried to use the Satinsky on the  common femoral.  This did not work because of the calcium. We had a 6  Marva with a stopcock ready,  used this to occlude the inflow because  we did not want to clamp the stent. Then, we did our anastomosis with  6-0 Prolene cutting the side of the vein making a nice frye and  finishing it, this was where most of the blood loss came from, about 100  plus in this area. We then used an Esmarch bandage and the tourniquet and the thigh and  blew it up to 300 and this worked pretty well, did not completely empty  the arteries, a little bit of trickle through and we did our distal  anastomosis with 6-0 Prolene, heel then toe. A very calcified vessel,  hard to get the needle through in some areas because of the  calcification. We then released the tourniquet and had good pulsatile  flow in the vein graft and good Doppler in the vein graft and in the  distal artery and in the artery at the ankle in the posterior tibial.   We then worked on drying it. We reversed the heparin. We did use some  Fibrillar. We used some FloSeal.  We looked and found small areas that  were bleeding. Once we were satisfied, we closed the groin in several layers with 2-0  and 3-0 Vicryl.   We had extended the incision on the groin because we  had some bleeding in the tunnel, we found a hematoma but no bleeding, so  then we again closed the groin in several layers being careful not to  pinch the graft, closed the leg in several layers as well 3-0 and 4-0  Vicryl and applied the Skin Affix glue and a MAIA dressing

## 2020-02-06 NOTE — CARE COORDINATION
INITIAL CASE MANAGEMENT ASSESSMENT    Reviewed chart, met with patient to assess possible discharge needs. Explained Case Management role/services. Living Situation: Patient lives with his wife in a mobile home with 3 EUGENIO. Confirmed mailing address is [address] address [address] [place], [place] [address]    ADLs: Independent     DME: RW, cane, wheelchair. Plans to use the RW     PT/OT Recs: PT rec continue to assess. OT rec home with assist PRN     Active Services: None     Transportation: Active . Reports his wife will transport at discharge     Medications: No barriers to taking/obtaining medications. Pharmacy is [facility] in [place], [place]. PCP: None. List of PCPs through [facility] as they are closest to patient's home address. HD/PD: N/A    Substance Use: Discussed alcohol use. Patient reports he has been through multiple treatment programs in the past including AA. He is not interested in treatment information at this time    PLAN/COMMENTS: List of local PCPs given. Denied substance use information. No additional needs at this time    SW/CM provided contact information for patient or family to call with any questions. SW/CM will follow and assist as needed.       Electronically signed by JUSTYN Heredia on 2/6/2020 at 2:37 PM Recent TTE 2/2018 showed EF of 50% with Severe pulmonary HTN.   Not in exacerbation currently -/p [facility] PPM placement  Recent interrogation negative for any salient events   -pt not on any home anticoagulation due to bleeding risk

## 2020-02-06 NOTE — PROGRESS NOTES
2/5/2020:  2000:Pt alert,oriented x 4,follow commands,at room air,no signs of respiratory distress. It is noted a PIV in his Right hand and frey catheter patent with urine yellow color. Skin:LUE:surgical incision covered with gauze and ACE wrap,pulse palpated sensation present, skin cold and able to move extremitie;left groin:surgical incision covered with gauze and tega derm connected to MAIA drain with green light flushing ; LLE:surgical incision dry and intact,no secretion,no bleeding,pedal and tibial pulse present,sensation:present and able to move LLE,skin:warm. Call bell in reach. Side rails up x 3. Bed locked and low position. Cardiac monitor:NSR. Continue monitoring. 2122; Pt complaining of pain. It was given 2 tab of Oxycodone 5-325 mg.  2/6/2020:  0000:Reassessment was performed. no change from last assess. 0063: It was given 2 tablets of Percocet for complaining of pain. 0437:Phlebotomist in room drawing blood for lab purposes.

## 2020-02-06 NOTE — PROGRESS NOTES
CO2 24  --    BUN 10  --    CREATININE 0.8*  --    CALCIUM 8.4  --      CBC:   Lab Results   Component Value Date    WBC 10.1 02/06/2020    RBC 3.50 02/06/2020    HGB 11.9 02/06/2020    HCT 34.5 02/06/2020    MCV 98.6 02/06/2020    MCH 34.1 02/06/2020    MCHC 34.6 02/06/2020    RDW 12.7 02/06/2020     02/06/2020    MPV 6.6 02/06/2020     BMP:    Lab Results   Component Value Date     02/06/2020    K 4.4 02/06/2020    K 4.3 02/05/2020    CL 96 02/06/2020    CO2 24 02/06/2020    BUN 10 02/06/2020    LABALBU 4.3 04/06/2019    CREATININE 0.8 02/06/2020    CALCIUM 8.4 02/06/2020    GFRAA >60 02/06/2020    GFRAA >60 11/23/2011    LABGLOM >60 02/06/2020    GLUCOSE 168 02/06/2020    GLUCOSE 109 04/06/2019         Mae Pond  Electronically signed 2/6/2020 at 7:25 AM

## 2020-02-06 NOTE — PROGRESS NOTES
disease) ([facility] [address] PONV (postoperative nausea and vomiting), and Wears glasses. has a past surgical history that includes Leg Surgery (Left, 07/28/2018); vascular surgery (Left, 07/21/2011); Coronary angioplasty; Cardiac surgery; Cardiac catheterization; and Femoral-tibial Bypass Graft (Left, 2/5/2020). Restrictions  Restrictions/Precautions  Restrictions/Precautions: Fall Risk  Position Activity Restriction  Other position/activity restrictions: L Groin MAIA Drain. ETOH at least 6 pk/day. Vision/Hearing  Vision: Impaired  Vision Exceptions: Wears glasses at all times  Hearing: Within functional limits     Subjective  General  Chart Reviewed: Yes  Patient assessed for rehabilitation services?: Yes  Additional Pertinent Hx: 53 y/o male admit 2/5/2020 with Ischemic Rest Pain, Traumatic Ulceration/Nonheal L LE.  2/5/2020 S/P Reversed Cephalic Vein Fem to Post Tib Bypass. PMH as noted including CAD, MI, CABG, C-Spine Fx (C2-C3, 2000, Fall off Ladder), Bueger's Disease, PVD/Surg (B LEs). Family / Caregiver Present: No  Referring Practitioner: Dr. Shereen Garza  Diagnosis:  Ischemic Rest Pain, Traumatic Ulceration/Nonheal L LE.  2/5/2020 S/P Reversed Cephalic Vein Fem to Post Tib Bypass. Follows Commands: Within Functional Limits  Subjective  Subjective: Pt agreeable to PT Eval/Rx. Pain Screening  Patient Currently in Pain: Yes          Orientation  Orientation  Overall Orientation Status: Within Functional Limits  Social/Functional History  Social/Functional History  Lives With: Spouse(Wife Armen Velasco). )  Type of Home: Mobile home  Home Layout: One level  Home Access: Stairs to enter with rails(3 steps to enter. )  Bathroom Shower/Tub: Tub only, Walk-in shower  Bathroom Toilet: Standard  Bathroom Equipment: Built-in shower seat, Toilet raiser  Bathroom Accessibility: Accessible  Home Equipment: Rolling walker, Cane, [employer]  ADL Assistance: Independent  Homemaking Assistance: Independent  Ambulation

## 2020-02-06 NOTE — CONSULTS
Hospital Medicine  Consult History & Physical        Chief Complaint: Alcohol withdrawal    Date of Service: Pt seen/examined in consultation on 2/6/2020    History Of Present Illness:      52 y.o. male coronary artery disease GERD hypertension who we are asked to see/evaluate by Janette Joya MD for medical management of medical issues along with alcohol withdrawal.  Patient has peripheral vascular disease with nonhealing ulcer and underwent reverse cephalic femoral to popliteal tibial bypass. Patient states he drinks anywhere between 10-15 beers a day. At present patient is tremulous. Not hallucinating. Past Medical History:        Diagnosis Date    Acute MI ([address] 2010    inferior wall    Buerger's disease ([facility] [address]     CAD (coronary artery disease)     S/P IWMIi 4/10 w/ RCA FIONA X3, normal LVEF. Cath 11/10 nonobstructive LAD dz, RCA stent with moderate instent restenosis.  s/p OM fiona X2 12/10    Closed fracture of cervical spine ([facility] [address]     2000; C2-3; fell off ladder    Depression     not on meds    GERD (gastroesophageal reflux disease)     managed by PCP    Hyperlipidemia     rec semi annual lipids with LDL goal <70    Hypertension     controlled    PAD (peripheral artery disease) ([facility])     Aortogram 90% SFA followed by Dr. Nicole WASSERMAN (postoperative nausea and vomiting)     after CABG    Wears glasses     reading       Past Surgical History:        Procedure Laterality Date    CARDIAC CATHETERIZATION      multiple    CARDIAC SURGERY      CABG--2 vessels    CORONARY ANGIOPLASTY      multiple cardiac stents    FEMORAL-TIBIAL BYPASS GRAFT Left 2/5/2020    LEFT FEMORAL TO POSTERIOR TIBIAL BYPASS WITH REVERSED LEFT ARM CEPHALIC VEIN performed by Janette Joya MD at [facility] Left 07/28/2018    stent  x 2; vascular dz; Left EIA angioplasty:  Left SFA/Pop atherectomy/angioplasty of native arteries    VASCULAR SURGERY Left 07/21/2011    IR FEMORAL POPLITEAL BYPASS GRAFT Medications Prior to Admission:    Prior to Admission medications    Medication Sig Start Date End Date Taking? Authorizing Provider   oxyCODONE-acetaminophen (PERCOCET) 5-325 MG per tablet Take 1 tablet by mouth every 4 hours as needed for Pain. Yes Historical Provider, MD   metoprolol tartrate (LOPRESSOR) 25 MG tablet TAKE 1 TABLET BY MOUTH TWICE A DAY 10/23/19  Yes Evi Childers MD   clopidogrel (PLAVIX) 75 MG tablet TAKE 1 TABLET BY MOUTH EVERY DAY 4/22/19  Yes Evi Childers MD   amLODIPine (NORVASC) 5 MG tablet TAKE 1 TABLET BY MOUTH EVERY DAY 4/22/19  Yes Evi Childers MD   simvastatin (ZOCOR) 40 MG tablet TAKE 1 TABLET NIGHTLY 4/22/19  Yes Evi Childers MD   aspirin 325 MG tablet Take 325 mg by mouth daily. Yes Historical Provider, MD   nitroGLYCERIN (NITROSTAT) 0.4 MG SL tablet Place 1 tablet under the tongue every 5 minutes as needed for Chest pain 4/22/19   Evi Childers MD       Allergies:  Patient has no known allergies. Social History:       TOBACCO:   reports that he quit smoking about 5 years ago. His smoking use included cigarettes. He has a 6.25 pack-year smoking history. He has never used smokeless tobacco.  ETOH:   reports current alcohol use of about 48.0 standard drinks of alcohol per week. Family History:      Reviewed in detail and negative for DM, CAD, Cancer, CVA. Positive as follows:        Problem Relation Age of Onset    Heart Disease Mother     Diabetes Father     High Blood Pressure Father     High Blood Pressure Brother     High Blood Pressure Paternal Uncle     Heart Disease Paternal Uncle     Stroke Paternal Uncle     Diabetes Paternal Uncle        REVIEW OF SYSTEMS:   Pertinent positives as noted in the HPI. All other systems reviewed and negative.     PHYSICAL EXAM PERFORMED:  BP (!) 148/83   Pulse 68   Temp 98.2 °F (36.8 °C) (Oral)   Resp 20   Ht 6' (1.829 m)   Wt 176 lb 12.9 oz (80.2 kg)   SpO2 100%   BMI 23.98 kg/m²   General

## 2020-02-06 NOTE — FLOWSHEET NOTE
0730 Dr Shereen Garza in unit, updated by night shift nurse, new orders rec/d. Dr rai Silva drsg. 0815 Pt up walking in roman with PT/OT, tolerated well. PT reports pt visibly shaking. Pt has a history of daily beer consumption. 0820 Assessment done. Pt up in chair with alarm in place. Pt fully oriented, admits to level 5 pain in legs and wants pain med, denies sob, nausea. Admits to daily beer consumption, last drink Tuesday night at 9pm. States due to L foot pain was up to 10 beers a day. CIWA score 6. Has visible tremors and feels sl anxious. /sys, u/o good, monitor SR.     0840 Call out to Dr Shereen Garza concerning above. 9239 Dr Shereen Garza updated and orders for hospitalist consult. Pt given scheduled meds and pain pills. Collins d/cd per order. Portion of ICU monitoring d/cd as pt can transfer. Pt states last time he was here was given ativan for alcohol problem. 0930 Ate all of breakfast. Voices n/c.    1030 Dr Sky Forde here, updated, into see pt, new orders rec/d. Pt admitted to MD he's drinking 15 beers/day. 1135 Pt drinking 2 Bud lights. Pain level is 3. Family visiting.    1300 Rally bag hung. Pt states \"I feel much better\" since beer. Voided. No change in vascular checks. L foot is warm today with loud doppler signals but not palpable. Wants to remain in chair. 1700 Remains up in chair, VSS, vascular checks unchanged, Medicated for arm and L ankle pain, level 4. Has mild tremor in hands only. Remains in chair watching tv, talking on phone. 1815 Eating dinner, pain decreased.  Electronically signed by Radhames Fletcher RN on 2/6/2020 at 6:19 PM

## 2020-02-06 NOTE — PROGRESS NOTES
artery disease), Closed fracture of cervical spine ([address] Depression, GERD (gastroesophageal reflux disease), Hyperlipidemia, Hypertension, PAD (peripheral artery disease) ([address] PONV (postoperative nausea and vomiting), and Wears glasses. has a past surgical history that includes Leg Surgery (Left, 07/28/2018); vascular surgery (Left, 07/21/2011); Coronary angioplasty; Cardiac surgery; Cardiac catheterization; and Femoral-tibial Bypass Graft (Left, 2/5/2020). Restrictions  Restrictions/Precautions  Restrictions/Precautions: Fall Risk  Position Activity Restriction  Other position/activity restrictions: L Groin MAIA Drain. ETOH at least 6 pk/day. Subjective   General  Chart Reviewed: Yes  Patient assessed for rehabilitation services?: Yes  Additional Pertinent Hx: 53 y/o male s/p LEFT FEMORAL TO POSTERIOR TIBIAL BYPASS WITH REVERSED LEFT ARM CEPHALIC VEIN 5/6/1748  Family / Caregiver Present: No  Referring Practitioner: Veronika Gaytan MD  Subjective  Subjective: Pt seen at bedside and agreeable to therapy  General Comment  Comments: Per RN ok for therapy     Social/Functional History  Social/Functional History  Lives With: Spouse(Wife Tato Lainez). )  Type of Home: Mobile home  Home Layout: One level  Home Access: Stairs to enter with rails(3 steps to enter. 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    Additional Comments: Pt reports he sleeps in a recliner        Objective   Vision: Impaired  Vision Exceptions: Wears glasses at all times  Hearing: Within functional limits Orientation  Overall Orientation Status: Within Normal Limits  Observation/Palpation  Observation: Dressing L UE and L LE. MAIA Drain L Groin. L Foot Erythema, Rubor.     Balance  Sitting Balance: Stand by assistance  Standing Balance: Contact guard assistance  Standing Balance  Time: 3-4 min for grooming tasks with CGA  Functional Mobility  Functional - Mobility Device: Rolling Walker  Activity: To/from bathroom  Assist Level: Contact guard assistance  Functional Mobility Comments: in roman and around room/bathroom with RW and CGA- cuing for walker safety  Toilet Transfers  Toilet - Technique: Ambulating  Equipment Used: Grab bars  Toilet Transfer: Contact guard assistance     ADL  Grooming: Contact guard assistance(standing sinkside to brush teeth)  Toileting: Dependent/Total(frey)  Additional Comments: anticipate pt will require min A for LB bathing/dressing, SBA for UB bathing/dressing based on balance, strength, ROM and endurance observed  Coordination  Coordination and Movement description: Resting tremors  Quality of Movement Other  Comment: pt reports tremors most likely related to not drinking since admit     Bed mobility  Supine to Sit: Contact guard assistance(HOB elevated)  Transfers  Sit to stand: Contact guard assistance  Stand to sit: Contact guard assistance  Transfer Comments: to/from RW        Perception  Overall Perceptual Status: WFL     Sensation  Overall Sensation Status: (Numb tingling hands/feet. )        LUE AROM (degrees)  LUE AROM : WFL  LUE General AROM: functional shoulder ROM, limited elbow/wrist ROM d/t vein harvest and ace wrap  Left Hand AROM (degrees)  Left Hand AROM: WFL  RUE AROM (degrees)  RUE AROM : WFL  Right Hand AROM (degrees)  Right Hand AROM: WFL     Plan   Plan  Times per week: 3-5  Current Treatment Recommendations: Strengthening, Gait Training, Safety Education & Training, Balance Training, Patient/Caregiver Education & Training, Self-Care / ADL, Functional Mobility Training, Endurance Training, Equipment Evaluation, Education, & procurement    AM-PAC Score  AM-PAC Inpatient Daily Activity Raw Score: 19 (02/06/20 0828)  AM-PAC Inpatient ADL T-Scale Score : 40.22 (02/06/20 0828)  ADL Inpatient CMS 0-100% Score: 42.8 (02/06/20 0828)  ADL Inpatient CMS G-Code Modifier : CK (02/06/20 5106)    Goals  Short term goals  Time Frame for Short term goals: Prior to DC: Short term goal 1: Pt will complete ADL transfers with supervision  Short term goal 2: Pt will complete LB dressing with supervision  Short term goal 3: Pt will complete toileting with supervision  Short term goal 4: Pt will tolerate standing > 5 min for functional task with supervision  Long term goals  Time Frame for Long term goals : stgs=ltgs  Patient Goals   Patient goals : to return home       Therapy Time   Individual Concurrent Group Co-treatment   Time In 0745         Time Out 0825         Minutes 40         Timed Code Treatment Minutes: 40 Minutes     This note to serve as OT d/c summary if pt is d/c-ed prior to next therapy session.     Milton Ash, OTR/L

## 2020-02-07 VITALS
WEIGHT: 177.25 LBS | HEIGHT: 72 IN | TEMPERATURE: 97.9 F | OXYGEN SATURATION: 100 % | BODY MASS INDEX: 24.01 KG/M2 | HEART RATE: 65 BPM | DIASTOLIC BLOOD PRESSURE: 76 MMHG | RESPIRATION RATE: 17 BRPM | SYSTOLIC BLOOD PRESSURE: 122 MMHG

## 2020-02-07 PROCEDURE — 2580000003 HC RX 258: Performed by: INTERNAL MEDICINE

## 2020-02-07 PROCEDURE — 6370000000 HC RX 637 (ALT 250 FOR IP): Performed by: SURGERY

## 2020-02-07 PROCEDURE — APPNB45 APP NON BILLABLE 31-45 MINUTES: Performed by: NURSE PRACTITIONER

## 2020-02-07 PROCEDURE — 2500000003 HC RX 250 WO HCPCS: Performed by: INTERNAL MEDICINE

## 2020-02-07 PROCEDURE — 2580000003 HC RX 258: Performed by: SURGERY

## 2020-02-07 PROCEDURE — 6360000002 HC RX W HCPCS: Performed by: SURGERY

## 2020-02-07 PROCEDURE — 97530 THERAPEUTIC ACTIVITIES: CPT

## 2020-02-07 PROCEDURE — 97535 SELF CARE MNGMENT TRAINING: CPT

## 2020-02-07 PROCEDURE — 99024 POSTOP FOLLOW-UP VISIT: CPT | Performed by: SURGERY

## 2020-02-07 PROCEDURE — 6360000002 HC RX W HCPCS: Performed by: INTERNAL MEDICINE

## 2020-02-07 PROCEDURE — 94760 N-INVAS EAR/PLS OXIMETRY 1: CPT

## 2020-02-07 PROCEDURE — 97116 GAIT TRAINING THERAPY: CPT

## 2020-02-07 RX ADMIN — FOLIC ACID: 5 INJECTION, SOLUTION INTRAMUSCULAR; INTRAVENOUS; SUBCUTANEOUS at 09:21

## 2020-02-07 RX ADMIN — ENOXAPARIN SODIUM 40 MG: 40 INJECTION SUBCUTANEOUS at 08:31

## 2020-02-07 RX ADMIN — CLOPIDOGREL BISULFATE 75 MG: 75 TABLET ORAL at 08:28

## 2020-02-07 RX ADMIN — METOPROLOL TARTRATE 25 MG: 25 TABLET ORAL at 08:28

## 2020-02-07 RX ADMIN — CEFAZOLIN 2 G: 1 INJECTION, POWDER, FOR SOLUTION INTRAMUSCULAR; INTRAVENOUS at 11:18

## 2020-02-07 RX ADMIN — AMLODIPINE BESYLATE 5 MG: 5 TABLET ORAL at 08:28

## 2020-02-07 RX ADMIN — CEFAZOLIN 2 G: 1 INJECTION, POWDER, FOR SOLUTION INTRAMUSCULAR; INTRAVENOUS at 03:50

## 2020-02-07 RX ADMIN — Medication 10 ML: at 08:31

## 2020-02-07 ASSESSMENT — PAIN SCALES - GENERAL: PAINLEVEL_OUTOF10: 0

## 2020-02-07 NOTE — PLAN OF CARE
Problem: Risk for Impaired Skin Integrity  Goal: Tissue integrity - skin and mucous membranes  Description  Structural intactness and normal physiological function of skin and  mucous membranes. Outcome: Ongoing  Note:   Reposition was performed every two hours and PRN basis. skin care was provided. No signs of new skin injury was noted. Problem: Falls - Risk of:  Goal: Will remain free from falls  Description  Will remain free from falls  Outcome: Ongoing  Note:   Side rails up x 3. Bed locked and low position. Falling star program remains in place. Call light and personal belongings within reach. Frequent visual monitoring continues. Toileting program inplace. Patient assisted in turning/repositioning at least once every 2 hours, and on a prn basis. Problem: Pain:  Goal: Pain level will decrease  Description  Pain level will decrease  Outcome: Ongoing  Note:   Pt was encourage to call this RN if she experiences pain. Numeric scale was used. No pain med was given during this shift.

## 2020-02-07 NOTE — PROGRESS NOTES
includes Leg Surgery (Left, 07/28/2018); vascular surgery (Left, 07/21/2011); Coronary angioplasty; Cardiac surgery; Cardiac catheterization; and Femoral-tibial Bypass Graft (Left, 2/5/2020). Restrictions  Restrictions/Precautions  Restrictions/Precautions: Fall Risk  Position Activity Restriction  Other position/activity restrictions: L Groin MAIA Drain. ETOH at least 6 pk/day. Subjective   General  Chart Reviewed: Yes  Patient assessed for rehabilitation services?: Yes  Additional Pertinent Hx: 53 y/o male s/p LEFT FEMORAL TO POSTERIOR TIBIAL BYPASS WITH REVERSED LEFT ARM CEPHALIC VEIN 4/9/9437  Family / Caregiver Present: No  Referring Practitioner: Dima Nick MD  Subjective  Subjective: Pt seen at bedside and agreeable to therapy.  pt reports feeling ready for home  General Comment  Comments: Per RN ok for therapy       Objective    ADL  Grooming: Supervision(standing sinkside)  LE Dressing: Supervision(simulated)  Toileting: Supervision        Balance  Sitting Balance: Supervision  Standing Balance: Supervision  Standing Balance  Time: 3-4 min for grooming tasks with SBA  Functional Mobility  Functional - Mobility Device: Rolling Walker  Activity: To/from bathroom  Assist Level: Supervision  Toilet Transfers  Toilet - Technique: Ambulating  Equipment Used: Grab bars  Toilet Transfer: Supervision     Bed mobility  Supine to Sit: Modified independent(HOB elevated)  Transfers  Sit to stand: Supervision  Stand to sit: Supervision  Transfer Comments: to/from [facility]  Overall Cognitive Status: [facility]     Plan   Plan  Times per week: DC acute OT  Current Treatment Recommendations: Strengthening, Gait Training, Safety Education & Training, Balance Training, Patient/Caregiver Education & Training, Self-Care / ADL, Functional Mobility Training, Endurance Training, Equipment Evaluation, Education, & procurement    AM-PAC Score  AM-PAC Inpatient Daily Activity Raw Score: 21 (02/07/20 1028)  AM-PAC

## 2020-02-07 NOTE — PLAN OF CARE
Problem: Falls - Risk of:  Goal: Absence of physical injury  Description  Absence of physical injury  Outcome: Met This Shift     Problem: Risk for Impaired Skin Integrity  Goal: Tissue integrity - skin and mucous membranes  Description  Structural intactness and normal physiological function of skin and  mucous membranes. Outcome: Completed     Problem: Falls - Risk of:  Goal: Will remain free from falls  Description  Will remain free from falls  Outcome: Completed  Note:   Patient ambulated in the hallway with PT, able to advance activity as tolerated. Problem: Pain:  Goal: Pain level will decrease  Description  Pain level will decrease  Outcome: Completed  Note:   Patient does not complain of any pain.

## 2020-02-07 NOTE — PROGRESS NOTES
Late entries:    1000: Report received from [facility], [facility]. Patient in chair, awake, VSS. Walked with PT while this RN received report. Tolerated well. 1118: ANCEF hung. 1240: Charge RN contacted Dr. Nika merino to discharge patient to home. 1300: PIV removed. Discharge teaching completed. MAIA drain intact and in place. Educated on MAIA drain care and to remove  in one week. Notified to call Dr. Nika Hinojosa for a follow up appointment in 2 weeks. Patient discharged in wheelchair in care of wife. No further questions from wife or patient.      Electronically signed by Oneal Stephen RN on 2/7/2020 at 2:34 PM

## 2020-02-07 NOTE — PROGRESS NOTES
1945:Pt sitting in chair,alert,oriented x 4,follow commands,at room air,no signs of respiratory distress. It is noted a PIV in his Right hand connected to banana bag. Skin:LUE:surgical incision covered with gauze and ACE wrap,pulse palpated sensation present, skin cold and able to move extremitie;left groin:surgical incision covered with gauze and tega derm connected to MAIA drain with green light flushing ; LLE:surgical incision dry and intact,pedal and tibial pulse present,sensation:present and able to move LLE,skin:warm. Call bell in reach. Urinal at beside. Continue monitoring.

## 2020-02-07 NOTE — PROGRESS NOTES
extremities. CN 2-12 tested, no defect noted. Psych: Oriented x 3. No anxiety. Awake. Alert. Intact judgement and insight. Data    LABS  CBC:   Recent Labs     02/05/20  0630 02/06/20  0437   WBC 6.4 10.1   HGB 14.8 11.9*   HCT 42.0 34.5*   MCV 97.3 98.6    172     BMP:   Recent Labs     02/05/20  0625 02/06/20  0436   * 133*   K 4.3 4.4   CL 93* 96*   CO2 23 24   BUN 9 10   CREATININE 0.8* 0.8*   GLUCOSE 99 168*     POC GLUCOSE:  No results for input(s): POCGLU in the last 72 hours. LIVER PROFILE: No results for input(s): AST, ALT, LIPASE, AMYLASE, LABALBU, BILIDIR, BILITOT, ALKPHOS in the last 72 hours. PT/INR: No results for input(s): PROTIME, INR in the last 72 hours. APTT:   Recent Labs     02/05/20  1435   APTT 44.9*     UA:No results for input(s): NITRITE, COLORU, PHUR, LABCAST, WBCUA, RBCUA, MUCUS, TRICHOMONAS, YEAST, BACTERIA, CLARITYU, SPECGRAV, LEUKOCYTESUR, UROBILINOGEN, BILIRUBINUR, BLOODU, GLUCOSEU, KETUA, AMORPHOUS in the last 72 hours. Microbiology:  Wound Culture: No results for input(s): WNDABS, ORG in the last 72 hours. Invalid input(s):  LABGRAM  Nasal Culture: No results for input(s): ORG, MRSAPCR in the last 72 hours. Blood Culture: No results for input(s): BC, BLOODCULT2 in the last 72 hours. Fungal Culture:   No results for input(s): FUNGSM in the last 72 hours. No results for input(s): FUNCXBLD in the last 72 hours. CSF Culture:  No results for input(s): COLORCSF, APPEARCSF, CFTUBE, CLOTCSF, WBCCSF, RBCCSF, NEUTCSF, NUMCELLSCSF, LYMPHSCSF, MONOCSF, GLUCCSF, VOLCSF in the last 72 hours. Respiratory Culture:  No results for input(s): Leala Expose in the last 72 hours. AFB:No results for input(s): AFBSMEAR in the last 72 hours. Urine Culture  No results for input(s): LABURIN in the last 72 hours.     RADIOLOGY:    No orders to display       CONSULTS:    IP CONSULT TO HOSPITALIST    ASSESSMENT AND PLAN:      Active Problems:    Ischemic ulcer of lower leg due to atherosclerotic disease ([facility] [address]    Ischemic pain of left foot  Resolved Problems:    * No resolved hospital problems. *    52 y.o. male coronary artery disease GERD hypertension who we are asked to see/evaluate by Meño Zhu MD for medical management of medical issues along with alcohol withdrawal.  Patient has peripheral vascular disease with nonhealing ulcer and underwent reverse cephalic femoral to popliteal tibial bypass    Alcohol abuse with early withdrawal- better   -Beer ordered with meals as patient is not interested in treatment  - banana bag      Coronary artery disease  -Continue cardiac meds     Hypertension  -Monitor blood pressure and continue BP meds     Hyperlipidemia  -Continue statin     PVD with femoral to posterior tibial bypass  -Management per vascular surgeon      Diet: DIET GENERAL;  Code Status: Full Code        Discharge plan -  Pt is stable , will sign off     The patient and / or the family were informed of the results of any tests, a time was given to answer questions, a plan was proposed and they agreed with plan. Discussed with consulting physicians, nursing and social work     The note was completed using EMR. Every effort was made to ensure accuracy; however, inadvertent computerized transcription errors may be present.        Oletha Dakins, MD

## 2020-02-07 NOTE — PROGRESS NOTES
Physical Therapy  Facility/Department: [address] ICU  Daily Treatment Note/Discharge Summary  NAME: Jay Mosqueda  : 1970  MRN: [id number]    Date of Service: 2020    Discharge Recommendations:  Home with assist PRN   PT Equipment Recommendations  Equipment Needed: No    Assessment   Body structures, Functions, Activity limitations: Decreased functional mobility ; Decreased safe awareness;Decreased endurance  Assessment: 53 y/o male admit 2020 with Ischemic Rest Pain, Traumatic Ulceration/Nonheal L LE.  2020 S/P Reversed Cephalic Vein Fem to Post Tib Bypass. PMH as noted including CAD, MI, CABG, C-Spine Fx (C2-C3, , Fall off Ladder), Bueger's Disease, PVD/Surg (B LEs). PTA pt living with wife in mobile home with few steps to enter. Pt reports adequate assist/support upon d/c. Pt samantha functional activities, gait alittle less antalgic with use of Walker. Advised pt to utilize Walker prn upon d/c (has access to [employer]). No further PT indicated at this time. Prognosis: Good  Decision Making: Medium Complexity  History: 53 y/o male admit 2020 with Ischemic Rest Pain, Traumatic Ulceration/Nonheal L LE.  2020 S/P Reversed Cephalic Vein Fem to Post Tib Bypass. PMH as noted including CAD, MI, CABG, C-Spine Fx (C2-C3, , Fall off Ladder), Bueger's Disease, PVD/Surg (B LEs). Exam: See above. Clinical Presentation: See above. Patient Education: Role of PT, POC, Need to call for assist, Safe use of Walker. Barriers to Learning: None. REQUIRES PT FOLLOW UP: No  Activity Tolerance  Activity Tolerance: Patient Tolerated treatment well     Patient Diagnosis(es): There were no encounter diagnoses.      has a past medical history of Acute MI ([address] Buerger's disease ([address] CAD (coronary artery disease), Closed fracture of cervical spine ([address] Depression, GERD (gastroesophageal reflux disease), Hyperlipidemia, Hypertension, PAD (peripheral artery disease) ([address] PONV Upon d/c acute care setting. Short term goal 1: Bed Mob Supervision. 2/7 Goal met. Short term goal 2: Amb with/without assist device 200' SBA.  2/7 Goal met. Short term goal 3: Stair Negotiation SBA/CGA.  2/7 Goal met. Patient Goals   Patient goals : Return home with Wife. Plan    Plan  Times per week: D/C Acute Care PT Services.    Safety Devices  Type of devices: Call light within reach, Chair alarm in place, Left in chair, Nurse notified     Therapy Time   Individual Concurrent Group Co-treatment   Time In 1000         Time Out 1025         Minutes 0744 [facility],

## 2020-02-07 NOTE — DISCHARGE INSTR - COC
and graft T82.598A    Chest pain R07.9    Dyspnea R06.00    Status post peripheral artery angioplasty Z98.62    Pain following surgery or procedure G89.18    Atherosclerosis of native arteries of extremities with rest pain, left leg (HCC) I70.222    Ischemic ulcer of lower leg due to atherosclerotic disease (HCC) I70.25, L97.209    Ischemic pain of left foot M79.672, I99.9       Isolation/Infection:   Isolation          No Isolation        Patient Infection Status     None to display          Nurse Assessment:  Last Vital Signs: /76   Pulse 65   Temp 97.9 °F (36.6 °C) (Oral)   Resp 17   Ht 6' (1.829 m)   Wt 177 lb 4 oz (80.4 kg)   SpO2 100%   BMI 24.04 kg/m²     Last documented pain score (0-10 scale): Pain Level: 0  Last Weight:   Wt Readings from Last 1 Encounters:   02/07/20 177 lb 4 oz (80.4 kg)     Mental Status:  {IP PT MENTAL STATUS:20030}    IV Access:  { SANTI IV ACCESS:197694835}    Nursing Mobility/ADLs:  Walking   {CHP DME WJGX:465925556}  Transfer  {CHP DME GQKN:975741082}  Bathing  {CHP DME PYME:891541139}  Dressing  {CHP DME QSLB:319118137}  Toileting  {CHP DME ZPDN:677294415}  Feeding  {CHP DME QCEJ:904570551}  Med Admin  {P DME RPAS:856008700}  Med Delivery   {[facility] MED Delivery:678823137}    Wound Care Documentation and Therapy:  Incision 07/21/11 Leg Left (Active)   Closure Surgical glue; Approximated 2/7/2020  9:00 AM   Drainage Amount None 2/7/2020  9:00 AM   Dressing/Treatment Dry dressing;Surgical glue 2/7/2020  9:00 AM   Dressing Changed Changed/New 2/7/2020  9:00 AM   Dressing Status Clean;Dry; Intact 2/7/2020  9:00 AM   Number of days: 3122       Wound 02/05/20 Coccyx 1 cm long, dried, straight, scabbed area (Active)   Wound Skin Tear 2/6/2020  7:45 PM   Dressing/Treatment Silicone border 5/0/2001  9:00 AM   Wound Length (cm) 1 cm 2/5/2020  3:30 PM   Wound Width (cm) 0.1 cm 2/5/2020  3:30 PM   Wound Surface Area (cm^2) 0.1 cm^2 2/5/2020  3:30 PM   Wound Assessment Clean;Dry; Intact; Other (Comment) 2020  9:00 AM   Drainage Amount None 2020  9:00 AM   Yellow%Wound Bed 100 2020  9:00 AM   Number of days: 1        Elimination:  Continence:   · Bowel: {YES / AE:36640}  · Bladder: {YES / IT:17943}  Urinary Catheter: {Urinary Catheter:974954050}   Colostomy/Ileostomy/Ileal Conduit: {YES / OZ:91066}       Date of Last BM: ***    Intake/Output Summary (Last 24 hours) at 2020 1257  Last data filed at 2020 1247  Gross per 24 hour   Intake 3807 ml   Output 3650 ml   Net 157 ml     I/O last 3 completed shifts:   In: 4189 [P.O.:2400; I.V.:1589; IV Piggyback:200]  Out: 1690 [Urine:3425]    Safety Concerns:     [address] Safety Concerns:619879381}    Impairments/Disabilities:      [address] Impairments/Disabilities:710555232}    Nutrition Therapy:  Current Nutrition Therapy:   [address] Diet List:995009258}    Routes of Feeding: {CHP DME Other Feedings:724369266}  Liquids: {Slp liquid thickness:31001}  Daily Fluid Restriction: {CHP DME Yes amt example:311366998}  Last Modified Barium Swallow with Video (Video Swallowing Test): {Done Not Done SLMB:422683163}    Treatments at the Time of Hospital Discharge:   Respiratory Treatments: ***  Oxygen Therapy:  {Therapy; copd oxygen:47938}  Ventilator:    { CC Vent IWNS:323706725}    Rehab Therapies: {THERAPEUTIC INTERVENTION:7419064046}  Weight Bearing Status/Restrictions: [address] [place]  Weight Bearin}  Other Medical Equipment (for information only, NOT a DME order):  {EQUIPMENT:935094379}  Other Treatments: ***    Patient's personal belongings (please select all that are sent with patient):  {CHP DME Belongings:191405192}    RN SIGNATURE:  {Esignature:131580921}    CASE MANAGEMENT/SOCIAL WORK SECTION    Inpatient Status Date: ***    Readmission Risk Assessment Score:  Readmission Risk              Risk of Unplanned Readmission:        9           Discharging to Facility/ Agency   · Name:   · Address:  · Phone:  · Fax:    Dialysis Facility (if applicable)   · Name:  · Address:  · Dialysis Schedule:  · Phone:  · Fax:    / signature: {Esignature:823079751}    PHYSICIAN SECTION    Prognosis: Good    Condition at Discharge: Stable    Rehab Potential (if transferring to Rehab): Good    Recommended Labs or Other Treatments After Discharge: Continue groin patricia VAC till next Tuesday and remove and discard    Update Admission H&P: No change in H&P    PHYSICIAN SIGNATURE:

## 2020-02-10 ENCOUNTER — TELEPHONE (OUTPATIENT)
Dept: SURGERY | Age: 50
End: 2020-02-10

## 2020-02-10 NOTE — TELEPHONE ENCOUNTER
PT had surgery on 2/5 for REVISION OF LEFT FEMORAL TO POSTERIAL TIBIAL ARTERY USING LEFT LESSER SAPHENOUS VEIN REVERSED AND HARVESTED LEFT ARM CEPHALIC VEIN REVERSED AND SPLICED TOGETHER. He needs to have a post op appt the week on 2/17. [facility] schedule is booked. Please call PT and advise when PT can be seen.

## 2020-02-11 NOTE — DISCHARGE SUMMARY
tablet  TAKE 1 TABLET NIGHTLY                  HPI and Hospital Course:     Esvin Chandler is a 52 y.o. male admitted for PAD and underwent REVISION OF LEFT FEMORAL TO POSTERIAL TIBIAL ARTERY USING LEFT LESSER SAPHENOUS VEIN REVERSED AND HARVESTED LEFT ARM CEPHALIC VEIN REVERSED AND SPLICED TOGETHER    (I am providing documentation only, pt seen bt Dr. Alyssa Reynoso day of discharge, information from EMR)    He tolerated this surgery and the post op period well. At time of dishcarge,warm left foot incisions doing well good Doppler dorsalis and posterior tibial, palpable graft pulse between incisions at the knee per Dr. Alyssa Reynoso     He is discharged to home with normal a temperature and vitals, tolerating an oral diet well, and having normal bowel activity. discharge instructions, restrictions, and reasons to call the office reviewed by MD and nursind  Follow up is planned, and all questions have been answered.         Roxie Solorio CNP (documentation only)

## 2020-02-18 ENCOUNTER — OFFICE VISIT (OUTPATIENT)
Dept: SURGERY | Age: 50
End: 2020-02-18

## 2020-02-18 VITALS
DIASTOLIC BLOOD PRESSURE: 82 MMHG | WEIGHT: 172 LBS | SYSTOLIC BLOOD PRESSURE: 142 MMHG | BODY MASS INDEX: 23.33 KG/M2 | HEART RATE: 81 BPM

## 2020-02-18 PROCEDURE — 99024 POSTOP FOLLOW-UP VISIT: CPT | Performed by: SURGERY

## 2020-02-18 ASSESSMENT — ENCOUNTER SYMPTOMS
ALLERGIC/IMMUNOLOGIC NEGATIVE: 1
EYE PAIN: 0
EYE REDNESS: 0
RESPIRATORY NEGATIVE: 1
GASTROINTESTINAL NEGATIVE: 1
EYE DISCHARGE: 0
PHOTOPHOBIA: 0
EYE ITCHING: 0

## 2020-02-18 NOTE — PROGRESS NOTES
Daily Progress Note   Lelo Navas MD      2/18/2020    Chief Complaint   Patient presents with    Post-Op Check     2 week post op check for Left leg Bypass surgery         HISTORY OF PRESENT ILLNESS:                The patient is a 52 y.o. male who presents with 2 week post op for left leg bypass surgery and left leg wound check. Blood flow sounds loud and good, and his left foot looks pink and he says it is not really painful like it was. Past Medical History:   Diagnosis Date    Acute MI ([facility] 2010    inferior wall    Buerger's disease ([facility] [facility]     CAD (coronary artery disease)     S/P IWMIi 4/10 w/ RCA FIONA X3, normal LVEF. Cath 11/10 nonobstructive LAD dz, RCA stent with moderate instent restenosis.  s/p OM fiona X2 12/10    Closed fracture of cervical spine ([facility] [address]     2000; C2-3; fell off ladder    Depression     not on meds    GERD (gastroesophageal reflux disease)     managed by PCP    Hyperlipidemia     rec semi annual lipids with LDL goal <70    Hypertension     controlled    PAD (peripheral artery disease) ([facility])     Aortogram 90% SFA followed by Dr. Fei WASSERMAN (postoperative nausea and vomiting)     after CABG    Wears glasses     reading       Past Surgical History:   Procedure Laterality Date    CARDIAC CATHETERIZATION      multiple    CARDIAC SURGERY      CABG--2 vessels    CORONARY ANGIOPLASTY      multiple cardiac stents    FEMORAL-TIBIAL BYPASS GRAFT Left 2/5/2020    LEFT FEMORAL TO POSTERIOR TIBIAL BYPASS WITH REVERSED LEFT ARM CEPHALIC VEIN performed by Robert Mojica MD at [address] [facility] Left 07/28/2018    stent  x 2; vascular dz; Left EIA angioplasty:  Left SFA/Pop atherectomy/angioplasty of native arteries    VASCULAR SURGERY Left 07/21/2011    IR FEMORAL POPLITEAL BYPASS GRAFT        Social History     Socioeconomic History    Marital status:      Spouse name: Not on file    Number of children: Not on file    Years of education: Not on file    Highest education level: Not on file   Occupational History    Not on file   Social Needs    Financial resource strain: Not on file    Food insecurity:     Worry: Not on file     Inability: Not on file    Transportation needs:     Medical: Not on file     Non-medical: Not on file   Tobacco Use    Smoking status: Former Smoker     Packs/day: 0.25     Years: 25.00     Pack years: 6.25     Types: Cigarettes     Last attempt to quit: 3/5/2014     Years since quittin.9    Smokeless tobacco: Never Used   Substance and Sexual Activity    Alcohol use:  Yes     Alcohol/week: 48.0 standard drinks     Types: 48 Cans of beer per week     Comment: patient states drinks at least 8 beers/day    Drug use: Never    Sexual activity: Yes     Partners: Female   Lifestyle    Physical activity:     Days per week: Not on file     Minutes per session: Not on file    Stress: Not on file   Relationships    Social connections:     Talks on phone: Not on file     Gets together: Not on file     Attends Amish service: Not on file     Active member of club or organization: Not on file     Attends meetings of clubs or organizations: Not on file     Relationship status: Not on file    Intimate partner violence:     Fear of current or ex partner: Not on file     Emotionally abused: Not on file     Physically abused: Not on file     Forced sexual activity: Not on file   Other Topics Concern    Not on file   Social History Narrative    Not on file       Family History   Problem Relation Age of Onset    Heart Disease Mother     Diabetes Father     High Blood Pressure Father     High Blood Pressure Brother     High Blood Pressure Paternal Uncle     Heart Disease Paternal Uncle     Stroke Paternal Uncle     Diabetes Paternal Uncle          Current Outpatient Medications:     metoprolol tartrate (LOPRESSOR) 25 MG tablet, TAKE 1 TABLET BY MOUTH TWICE A DAY, Disp: 180 tablet, Rfl: 3    clopidogrel (PLAVIX) 75 MG tablet, TAKE 1 TABLET BY MOUTH EVERY DAY, Disp: 90 tablet, Rfl: 3    amLODIPine (NORVASC) 5 MG tablet, TAKE 1 TABLET BY MOUTH EVERY DAY, Disp: 90 tablet, Rfl: 3    simvastatin (ZOCOR) 40 MG tablet, TAKE 1 TABLET NIGHTLY, Disp: 90 tablet, Rfl: 3    nitroGLYCERIN (NITROSTAT) 0.4 MG SL tablet, Place 1 tablet under the tongue every 5 minutes as needed for Chest pain, Disp: 25 tablet, Rfl: 3    aspirin 325 MG tablet, Take 325 mg by mouth daily. , Disp: , Rfl:     Patient has no known allergies. Vitals:    02/18/20 1532   BP: (!) 142/82   Site: Right Upper Arm   Pulse: 81   Weight: 172 lb (78 kg)       Admission on 02/05/2020, Discharged on 02/07/2020   Component Date Value Ref Range Status    WBC 02/05/2020 6.4  4.0 - 11.0 K/uL Final    RBC 02/05/2020 4.31  4.20 - 5.90 M/uL Final    Hemoglobin 02/05/2020 14.8  13.5 - 17.5 g/dL Final    Hematocrit 02/05/2020 42.0  40.5 - 52.5 % Final    MCV 02/05/2020 97.3  80.0 - 100.0 fL Final    MCH 02/05/2020 34.2* 26.0 - 34.0 pg Final    MCHC 02/05/2020 35.2  31.0 - 36.0 g/dL Final    RDW 02/05/2020 12.8  12.4 - 15.4 % Final    Platelets 42/32/7806 201  135 - 450 K/uL Final    MPV 02/05/2020 6.4  5.0 - 10.5 fL Final    Sodium 02/05/2020 130* 136 - 145 mmol/L Final    Potassium reflex Magnesium 02/05/2020 4.3  3.5 - 5.1 mmol/L Final    Comment: Specimen hemolysis has exceeded the interference as defined by Roche. Value may be falsely increased. Suggest recollection if clinically  indicated.  Chloride 02/05/2020 93* 99 - 110 mmol/L Final    CO2 02/05/2020 23  21 - 32 mmol/L Final    Anion Gap 02/05/2020 14  3 - 16 Final    Glucose 02/05/2020 99  70 - 99 mg/dL Final    BUN 02/05/2020 9  7 - 20 mg/dL Final    CREATININE 02/05/2020 0.8* 0.9 - 1.3 mg/dL Final    GFR Non- 02/05/2020 >60  >60 Final    Comment: >60 mL/min/1.73m2 EGFR, calc. for ages 25 and older using the  MDRD formula (not corrected for weight), is valid for stable  renal function.       GFR  02/05/2020 >60  >60 Final    Comment: Chronic Kidney Disease: less than 60 ml/min/1.73 sq.m. Kidney Failure: less than 15 ml/min/1.73 sq.m. Results valid for patients 18 years and older.  Calcium 02/05/2020 9.2  8.3 - 10.6 mg/dL Final    ABO/Rh 02/05/2020 O POS   Final    Antibody Screen 02/05/2020 NEG   Final    aPTT 02/05/2020 44.9* 24.2 - 36.2 sec Final    Comment: Therapeutic range: 49.0 - 76.0 sec    Effective 11-19-19 9:00am EST  Please note reference ranges have  changed for PTT Testing.       WBC 02/06/2020 10.1  4.0 - 11.0 K/uL Final    RBC 02/06/2020 3.50* 4.20 - 5.90 M/uL Final    Hemoglobin 02/06/2020 11.9* 13.5 - 17.5 g/dL Final    Hematocrit 02/06/2020 34.5* 40.5 - 52.5 % Final    MCV 02/06/2020 98.6  80.0 - 100.0 fL Final    MCH 02/06/2020 34.1* 26.0 - 34.0 pg Final    MCHC 02/06/2020 34.6  31.0 - 36.0 g/dL Final    RDW 02/06/2020 12.7  12.4 - 15.4 % Final    Platelets 50/91/6452 172  135 - 450 K/uL Final    MPV 02/06/2020 6.6  5.0 - 10.5 fL Final    Neutrophils % 02/06/2020 88.6  % Final    Lymphocytes % 02/06/2020 5.3  % Final    Monocytes % 02/06/2020 6.0  % Final    Eosinophils % 02/06/2020 0.0  % Final    Basophils % 02/06/2020 0.1  % Final    Neutrophils Absolute 02/06/2020 8.9* 1.7 - 7.7 K/uL Final    Lymphocytes Absolute 02/06/2020 0.5* 1.0 - 5.1 K/uL Final    Monocytes Absolute 02/06/2020 0.6  0.0 - 1.3 K/uL Final    Eosinophils Absolute 02/06/2020 0.0  0.0 - 0.6 K/uL Final    Basophils Absolute 02/06/2020 0.0  0.0 - 0.2 K/uL Final    Sodium 02/06/2020 133* 136 - 145 mmol/L Final    Potassium 02/06/2020 4.4  3.5 - 5.1 mmol/L Final    Chloride 02/06/2020 96* 99 - 110 mmol/L Final    CO2 02/06/2020 24  21 - 32 mmol/L Final    Anion Gap 02/06/2020 13  3 - 16 Final    Glucose 02/06/2020 168* 70 - 99 mg/dL Final    BUN 02/06/2020 10  7 - 20 mg/dL Final    CREATININE 02/06/2020 0.8* 0.9 - 1.3 mg/dL Final    GFR Non-African American 02/06/2020 >60  >60 Final    Comment: >60 mL/min/1.73m2 EGFR, calc. for ages 25 and older using the  MDRD formula (not corrected for weight), is valid for stable  renal function.  GFR  02/06/2020 >60  >60 Final    Comment: Chronic Kidney Disease: less than 60 ml/min/1.73 sq.m. Kidney Failure: less than 15 ml/min/1.73 sq.m. Results valid for patients 18 years and older.  Calcium 02/06/2020 8.4  8.3 - 10.6 mg/dL Final       Review of Systems   Constitutional: Negative. HENT: Negative. Eyes: Positive for visual disturbance (wears glasses). Negative for photophobia, pain, discharge, redness and itching. Respiratory: Negative. Cardiovascular: Positive for leg swelling (left leg swelling). Negative for chest pain and palpitations. Gastrointestinal: Negative. Endocrine: Negative. Genitourinary: Negative. Musculoskeletal: Negative. Skin: Negative. Allergic/Immunologic: Negative. Neurological: Negative. Hematological: Negative. Psychiatric/Behavioral: Negative. All other systems reviewed and are negative. Physical Exam  Vitals signs reviewed. Constitutional:       Appearance: He is well-developed. HENT:      Head: Normocephalic. Eyes:      Pupils: Pupils are equal, round, and reactive to light. Neck:      Musculoskeletal: Normal range of motion and neck supple. Vascular: No carotid bruit. Cardiovascular:      Rate and Rhythm: Normal rate and regular rhythm. Pulses:           Carotid pulses are 2+ on the right side with bruit and 2+ on the left side. Radial pulses are 2+ on the right side and 2+ on the left side. Femoral pulses are 2+ on the right side and 2+ on the left side. Popliteal pulses are 1+ on the right side and 2+ on the left side. Dorsalis pedis pulses are 2+ on the right side and 0 on the left side. Posterior tibial pulses are 2+ on the right side and 0 on the left side.       Heart

## 2020-03-03 ENCOUNTER — TELEPHONE (OUTPATIENT)
Dept: SURGERY | Age: 50
End: 2020-03-03

## 2020-03-04 NOTE — TELEPHONE ENCOUNTER
I talked to [facility] yesterday and changed his appointment again. His wife brings him, and her work schedule made it impossible for her to get him here on the previously scheduled appointments.

## 2020-03-31 RX ORDER — CLOPIDOGREL BISULFATE 75 MG/1
TABLET ORAL
Qty: 90 TABLET | Refills: 3 | Status: SHIPPED | OUTPATIENT
Start: 2020-03-31 | End: 2020-06-29 | Stop reason: SDUPTHER

## 2020-04-28 RX ORDER — AMLODIPINE BESYLATE 5 MG/1
TABLET ORAL
Qty: 90 TABLET | Refills: 3 | Status: SHIPPED | OUTPATIENT
Start: 2020-04-28 | End: 2020-06-29 | Stop reason: SDUPTHER

## 2020-04-29 RX ORDER — SIMVASTATIN 40 MG
TABLET ORAL
Qty: 90 TABLET | Refills: 3 | Status: SHIPPED | OUTPATIENT
Start: 2020-04-29 | End: 2020-06-29 | Stop reason: SDUPTHER

## 2020-06-22 ASSESSMENT — ENCOUNTER SYMPTOMS
ABDOMINAL DISTENTION: 0
SHORTNESS OF BREATH: 0
WHEEZING: 0
BLOOD IN STOOL: 0
COUGH: 0
EYE REDNESS: 0

## 2020-06-22 NOTE — PROGRESS NOTES
Albina Joseph is a 52 y.o. male    Reason for Visit:  f/u CAD                         HPI  Albina Joseph  denies chest pain, palpitations, dizziness, syncope, worsening L ankle swelling and increased dyspnea. He states that he is feeling well. He has LLE redness and is s/p vascular surgery. He does not have a PCP. Review of Systems   Constitutional: Negative for activity change, appetite change, chills, fatigue, fever and unexpected weight change. HENT: Negative for congestion, nosebleeds and tinnitus. Eyes: Negative for redness and visual disturbance. Respiratory: Negative for cough, shortness of breath and wheezing. Cardiovascular: Negative for chest pain, palpitations and leg swelling. Gastrointestinal: Negative for abdominal distention and blood in stool. Genitourinary: Negative for dysuria and hematuria. Musculoskeletal: Negative for gait problem and myalgias. Neurological: Negative for dizziness and speech difficulty. Hematological: Does not bruise/bleed easily. Psychiatric/Behavioral: Negative for behavioral problems and confusion. All other systems reviewed and are negative. Physical Exam   Constitutional: He is oriented to person, place, and time. He appears well-developed and well-nourished. HENT:   Head: Normocephalic and atraumatic. Eyes: Conjunctivae and EOM are normal.   Neck: Normal range of motion. Neck supple. Cardiovascular: Normal rate, regular rhythm, S1 normal and S2 normal. Exam reveals no gallop. No murmur heard. Pulmonary/Chest: Effort normal and breath sounds normal.   Abdominal: Soft. Bowel sounds are normal.   Musculoskeletal: Normal range of motion. General: Edema (LLE) present. Comments: Redness/cellulitis LLE   Neurological: He is alert and oriented to person, place, and time. Skin: Skin is warm and dry. Psychiatric: He has a normal mood and affect. His behavior is normal.   Nursing note and vitals reviewed.       Wt Highest education level: None   Occupational History    None   Social Needs    Financial resource strain: None    Food insecurity     Worry: None     Inability: None    Transportation needs     Medical: None     Non-medical: None   Tobacco Use    Smoking status: Former Smoker     Packs/day: 0.25     Years: 25.00     Pack years: 6.25     Types: Cigarettes     Last attempt to quit: 3/5/2014     Years since quittin.3    Smokeless tobacco: Never Used   Substance and Sexual Activity    Alcohol use:  Yes     Alcohol/week: 48.0 standard drinks     Types: 48 Cans of beer per week     Comment: patient states drinks at least 8 beers/day    Drug use: Never    Sexual activity: Yes     Partners: Female   Lifestyle    Physical activity     Days per week: None     Minutes per session: None    Stress: None   Relationships    Social connections     Talks on phone: None     Gets together: None     Attends Mormonism service: None     Active member of club or organization: None     Attends meetings of clubs or organizations: None     Relationship status: None    Intimate partner violence     Fear of current or ex partner: None     Emotionally abused: None     Physically abused: None     Forced sexual activity: None   Other Topics Concern    None   Social History Narrative    None       Past Surgical History:   Procedure Laterality Date    CARDIAC CATHETERIZATION      multiple    CARDIAC SURGERY      CABG--2 vessels    CORONARY ANGIOPLASTY      multiple cardiac stents    FEMORAL-TIBIAL BYPASS GRAFT Left 2020    LEFT FEMORAL TO POSTERIOR TIBIAL BYPASS WITH REVERSED LEFT ARM CEPHALIC VEIN performed by Levar Oneill MD at [facility] Left 2018    stent  x 2; vascular dz; Left EIA angioplasty:  Left SFA/Pop atherectomy/angioplasty of native arteries    VASCULAR SURGERY Left 2011    IR FEMORAL POPLITEAL BYPASS GRAFT        Family History   Problem Relation Age of Onset    Heart Disease by me.

## 2020-06-29 ENCOUNTER — OFFICE VISIT (OUTPATIENT)
Dept: CARDIOLOGY CLINIC | Age: 50
End: 2020-06-29
Payer: COMMERCIAL

## 2020-06-29 VITALS
WEIGHT: 177.6 LBS | BODY MASS INDEX: 23.54 KG/M2 | HEART RATE: 80 BPM | DIASTOLIC BLOOD PRESSURE: 86 MMHG | SYSTOLIC BLOOD PRESSURE: 138 MMHG | HEIGHT: 73 IN | OXYGEN SATURATION: 98 % | TEMPERATURE: 97.4 F

## 2020-06-29 PROCEDURE — 99214 OFFICE O/P EST MOD 30 MIN: CPT | Performed by: INTERNAL MEDICINE

## 2020-06-29 RX ORDER — CLOPIDOGREL BISULFATE 75 MG/1
TABLET ORAL
Qty: 90 TABLET | Refills: 3 | Status: SHIPPED | OUTPATIENT
Start: 2020-06-29 | End: 2021-08-06

## 2020-06-29 RX ORDER — AMLODIPINE BESYLATE 5 MG/1
TABLET ORAL
Qty: 90 TABLET | Refills: 3 | Status: SHIPPED | OUTPATIENT
Start: 2020-06-29 | End: 2021-08-06

## 2020-06-29 RX ORDER — SIMVASTATIN 40 MG
TABLET ORAL
Qty: 90 TABLET | Refills: 3 | Status: SHIPPED | OUTPATIENT
Start: 2020-06-29 | End: 2021-08-06

## 2020-07-09 ENCOUNTER — TELEPHONE (OUTPATIENT)
Dept: CARDIOLOGY CLINIC | Age: 50
End: 2020-07-09

## 2020-07-10 ENCOUNTER — OFFICE VISIT (OUTPATIENT)
Dept: SURGERY | Age: 50
End: 2020-07-10
Payer: COMMERCIAL

## 2020-07-10 VITALS
WEIGHT: 173 LBS | BODY MASS INDEX: 22.82 KG/M2 | HEART RATE: 80 BPM | DIASTOLIC BLOOD PRESSURE: 98 MMHG | SYSTOLIC BLOOD PRESSURE: 176 MMHG

## 2020-07-10 PROBLEM — L03.116 CELLULITIS OF LEFT LEG: Status: ACTIVE | Noted: 2020-07-10

## 2020-07-10 PROCEDURE — 99214 OFFICE O/P EST MOD 30 MIN: CPT | Performed by: SURGERY

## 2020-07-10 RX ORDER — SULFAMETHOXAZOLE AND TRIMETHOPRIM 800; 160 MG/1; MG/1
1 TABLET ORAL 2 TIMES DAILY
Qty: 20 TABLET | Refills: 0 | Status: SHIPPED | OUTPATIENT
Start: 2020-07-10 | End: 2020-07-20

## 2020-07-10 NOTE — PATIENT INSTRUCTIONS
Mary Grace Man should return in two weeks for a wound check. Today he will have a prescription for antibiotics, bactrim DS, one by mouth twice daily for 10 days. He should cover the wounds with gauze and use an ace wrap to cover his leg. Elevate the leg as much as possible, higher than the heart.

## 2020-07-24 ENCOUNTER — OFFICE VISIT (OUTPATIENT)
Dept: SURGERY | Age: 50
End: 2020-07-24
Payer: COMMERCIAL

## 2020-07-24 VITALS
HEART RATE: 68 BPM | WEIGHT: 178 LBS | SYSTOLIC BLOOD PRESSURE: 150 MMHG | BODY MASS INDEX: 23.48 KG/M2 | DIASTOLIC BLOOD PRESSURE: 94 MMHG

## 2020-07-24 PROBLEM — R21 RASH OF UNKNOWN ETIOLOGY: Status: ACTIVE | Noted: 2020-07-24

## 2020-07-24 PROCEDURE — 99214 OFFICE O/P EST MOD 30 MIN: CPT | Performed by: SURGERY

## 2020-07-24 ASSESSMENT — ENCOUNTER SYMPTOMS
GASTROINTESTINAL NEGATIVE: 1
ALLERGIC/IMMUNOLOGIC NEGATIVE: 1
RESPIRATORY NEGATIVE: 1
EYE DISCHARGE: 0
EYE ITCHING: 0
PHOTOPHOBIA: 0
EYE REDNESS: 0
EYE PAIN: 0

## 2020-07-24 NOTE — PATIENT INSTRUCTIONS
Get an OTC cortisone cream to use on his legs, something similar to Cortaid. Apply at night, and again in the morning after showering. Juan Estrella will need to schedule a left leg graft scan and office visit in one month.

## 2020-07-24 NOTE — PROGRESS NOTES
Daily Progress Note   Nimco Robb MD      7/24/2020    Chief Complaint   Patient presents with    Leg Swelling     Patient here for 2 week LLE wound check. Patient says it itches & is painful at times. Patient has been ACE wrapping it at home. HISTORY OF PRESENT ILLNESS:                The patient is a 52 y.o. male who presents with a 2 week follow up for wound care. Today he says he has finished his antibiotics, and while his leg looks slightly better, but is very itchy. Juan Estrella says he is able to walk about a block and back to the post office without needing to stop. He knows of no other time, or area where he has ever had such a rash/red type area. There is no family history of psoriasis. Past Medical History:   Diagnosis Date    Acute MI ([facility] [address] 2010    inferior wall    Buerger's disease ([facility] [facility]     CAD (coronary artery disease)     S/P IWMIi 4/10 w/ RCA FIONA X3, normal LVEF. Cath 11/10 nonobstructive LAD dz, RCA stent with moderate instent restenosis.  s/p OM fiona X2 12/10    Closed fracture of cervical spine ([facility] [address]     2000; C2-3; fell off ladder    Depression     not on meds    GERD (gastroesophageal reflux disease)     managed by PCP    Hyperlipidemia     rec semi annual lipids with LDL goal <70    Hypertension     controlled    PAD [facility]per[facility] artery [facility]) ([facility])     Aortogram 90% SFA followed by Dr. Balta WASSERMAN (postoperative nausea and vomiting)     after CABG    Wears glasses     reading       Past Surgical History:   Procedure Laterality Date    CARDIAC CATHETERIZATION      multiple    CARDIAC SURGERY      CABG--2 vessels    CORONARY ANGIOPLASTY      multiple cardiac stents    FEMORAL-TIBIAL BYPASS GRAFT Left 2/5/2020    LEFT FEMORAL TO POSTERIOR TIBIAL BYPASS WITH REVERSED LEFT ARM CEPHALIC VEIN performed by Govind [facility] Left 07/28/2018    stent  x 2; vascular dz; Left EIA angioplasty:  Left SFA/Pop atherectomy/angioplasty of native arteries    VASCULAR SURGERY Left 2011    IR FEMORAL POPLITEAL BYPASS GRAFT        Social History     Socioeconomic History    Marital status:      Spouse name: Not on file    Number of children: Not on file    Years of education: Not on file    Highest education level: Not on file   Occupational History    Not on file   Social Needs    Financial resource strain: Not on file    Food insecurity     Worry: Not on file     Inability: Not on file    Transportation needs     Medical: Not on file     Non-medical: Not on file   Tobacco Use    Smoking status: Former Smoker     Packs/day: 0.25     Years: 25.00     Pack years: 6.25     Types: Cigarettes     Last attempt to quit: 3/5/2014     Years since quittin.3    Smokeless tobacco: Never Used   Substance and Sexual Activity    Alcohol use:  Yes     Alcohol/week: 48.0 standard drinks     Types: 48 Cans of beer per week     Comment: patient states drinks at least 8 beers/day    Drug use: Never    Sexual activity: Yes     Partners: Female   Lifestyle    Physical activity     Days per week: Not on file     Minutes per session: Not on file    Stress: Not on file   Relationships    Social connections     Talks on phone: Not on file     Gets together: Not on file     Attends Muslim service: Not on file     Active member of club or organization: Not on file     Attends meetings of clubs or organizations: Not on file     Relationship status: Not on file    Intimate partner violence     Fear of current or ex partner: Not on file     Emotionally abused: Not on file     Physically abused: Not on file     Forced sexual activity: Not on file   Other Topics Concern    Not on file   Social History Narrative    Not on file       Family History   Problem Relation Age of Onset    Heart Disease Mother     Diabetes Father     High Blood Pressure Father     High Blood Pressure Brother     High Blood Pressure Paternal Uncle     Heart Disease Paternal Uncle     Stroke Paternal Uncle     Diabetes Paternal Uncle          Current Outpatient Medications:     simvastatin (ZOCOR) 40 MG tablet, TAKE 1 TABLET NIGHTLY, Disp: 90 tablet, Rfl: 3    metoprolol tartrate (LOPRESSOR) 25 MG tablet, TAKE 1 TABLET BY MOUTH TWICE A DAY, Disp: 180 tablet, Rfl: 3    clopidogrel (PLAVIX) 75 MG tablet, TAKE 1 TABLET BY MOUTH EVERY DAY, Disp: 90 tablet, Rfl: 3    amLODIPine (NORVASC) 5 MG tablet, TAKE 1 TABLET BY MOUTH EVERY DAY, Disp: 90 tablet, Rfl: 3    nitroGLYCERIN (NITROSTAT) 0.4 MG SL tablet, Place 1 tablet under the tongue every 5 minutes as needed for Chest pain (Patient taking differently: Place 0.4 mg under the tongue every 5 minutes as needed for Chest pain ), Disp: 25 tablet, Rfl: 3    aspirin 325 MG tablet, Take 325 mg by mouth daily. , Disp: , Rfl:     Patient has no known allergies.     Vitals:    07/24/20 1050   BP: (!) 150/94   Site: Left Upper Arm   Pulse: 68   Weight: 178 lb (80.7 kg)       Orders Only on 07/09/2020   Component Date Value Ref Range Status    Cholesterol, Total 07/09/2020 184  125-200^125^200 mg/dL Final    HDL 07/09/2020 109  >=40^>=40 mg/dL Final    Comment: National Cholesterol Education Program Adult Treatment Panel Guidelines (ATPIII):  TOTAL CHOLESTEROL:                      LESS THAN 200 mg/dL Desirable                     200-239 mg/dL Borderline                     GREATER OR EQUAL  mg/dL High  TRIGLYCERIDE:                      LESS THAN 150 mg/dL Normal                     150-199 mg/dL Borderline High                     200-499 mg/dL High                     GREATER OR EQUAL  mg/dL Very High  HDL CHOLESTEROL:                      LESS THAN 40 mg/dL Low                     GREATER THAN 60 mg/dL Desirable  LDL CHOLESTEROL:                     LESS THAN 100 mg/dL Optimal                     100-129 mg/dL Near optimal/above optimal                     130-159 mg/dL Borderline High                     160-189 mg/dL High GREATER OR EQUAL  mg/dL Very High  NOTE: CLINICAL DECISION MAKING, INCLUDING POSSIBLE TREATMENT, WILL DEPEND ON RISK FACTOR   ANALYSIS AND CONSULTATION WITH A PHYSICIAN.  LDL Calculated 07/09/2020 58  <=100^^<=100 mg/dL Final    Triglycerides 07/09/2020 83  <=150^^<=150 mg/dL Final       Review of Systems   Constitutional: Negative. HENT: Negative. Eyes: Positive for visual disturbance (wears glasses). Negative for photophobia, pain, discharge, redness and itching. Respiratory: Negative. Cardiovascular: Positive for leg swelling (left leg swelling). Negative for chest pain and palpitations. Gastrointestinal: Negative. Endocrine: Negative. Genitourinary: Negative. Musculoskeletal: Negative. Skin: Negative. Allergic/Immunologic: Negative. Neurological: Negative. Hematological: Negative. Psychiatric/Behavioral: Negative. All other systems reviewed and are negative. Physical Exam  Vitals signs reviewed. Constitutional:       Appearance: He is well-developed. HENT:      Head: Normocephalic. Eyes:      Pupils: Pupils are equal, round, and reactive to light. Neck:      Musculoskeletal: Normal range of motion and neck supple. Vascular: No carotid bruit. Cardiovascular:      Rate and Rhythm: Normal rate and regular rhythm. Pulses:           Carotid pulses are 2+ on the right side with bruit and 2+ on the left side. Radial pulses are 2+ on the right side and 2+ on the left side. Femoral pulses are 2+ on the right side and 2+ on the left side. Popliteal pulses are 1+ on the right side and 2+ on the left side. Dorsalis pedis pulses are 1+ on the right side and 1+ on the left side. Posterior tibial pulses are 2+ on the right side and 2+ on the left side. Heart sounds: Normal heart sounds.       Comments: MEASUREMENTS 7/24/2020:    RIGHT ANKLE: 19.8 cm   RIGHT CALF: 34.2 cm     LEFT ANKLE: 23.7 cm Swelling present. Skin:     General: Skin is warm. Neurological:      Cranial Nerves: No cranial nerve deficit. Sensory: Sensory deficit (hypersensitive from revascularization effect) present. Coordination: Coordination normal.      Gait: Gait normal.           ASSESSMENT:    Problem List Items Addressed This Visit     Rash of unknown etiology    PAD (peripheral artery disease) ([facility] - Primary          PLAN:    Get an OTC cortisone cream to use on his legs, something similar to Cortaid. Apply at night, and again in the morning after showering. Almost looks like psoriasis but there is no history of psoriasis I think he should get a consult from dermatology  Biggest complaint is that it itches it is scaly some cracks in the skin around the ankle. Does have significant swelling left versus right do not use Ace wrap but stops short of the top of the calf or the graft crosses  Sarah Day will need to schedule a left leg graft scan and office visit in one month. Sid García MA am scribing for and in the presence of Vijaya Torres MD on this date of 07/24/20    I Ann Hatfield MD personally performed the services described in this documentation as scribed by the Medical Assistant Goldie Alvarez in my presence and it is both accurate and complete.       Electronically signed by Vijaya Torres MD on 7/24/2020 at 11:41 AM

## 2020-08-25 ENCOUNTER — PROCEDURE VISIT (OUTPATIENT)
Dept: SURGERY | Age: 50
End: 2020-08-25
Payer: COMMERCIAL

## 2020-08-25 ENCOUNTER — OFFICE VISIT (OUTPATIENT)
Dept: SURGERY | Age: 50
End: 2020-08-25
Payer: COMMERCIAL

## 2020-08-25 VITALS — SYSTOLIC BLOOD PRESSURE: 158 MMHG | DIASTOLIC BLOOD PRESSURE: 92 MMHG

## 2020-08-25 PROCEDURE — 93926 LOWER EXTREMITY STUDY: CPT | Performed by: SURGERY

## 2020-08-25 PROCEDURE — 99214 OFFICE O/P EST MOD 30 MIN: CPT | Performed by: SURGERY

## 2020-08-25 RX ORDER — METHYLPREDNISOLONE 4 MG/1
TABLET ORAL
Qty: 1 KIT | Refills: 0 | Status: SHIPPED | OUTPATIENT
Start: 2020-08-25 | End: 2020-08-31

## 2020-08-25 ASSESSMENT — ENCOUNTER SYMPTOMS
ALLERGIC/IMMUNOLOGIC NEGATIVE: 1
EYE DISCHARGE: 0
GASTROINTESTINAL NEGATIVE: 1
EYE REDNESS: 0
RESPIRATORY NEGATIVE: 1
PHOTOPHOBIA: 0
EYE ITCHING: 0
EYE PAIN: 0

## 2020-08-25 NOTE — PROGRESS NOTES
Daily Progress Note   Veronica Stephens MD      8/25/2020    Chief Complaint   Patient presents with    Follow-up     Six week follow up for PAD and left leg graft/results of arterial scan          HISTORY OF PRESENT ILLNESS:                The patient is a 52 y.o. male who presents with a six week follow up for left leg arterial scan and results. Angelito Rodriguez has been doing well, except for the red, scaly skin on the left leg. He says it is itchy, and he also has blotchy, red, scaly patches moving up his body. The scaly patches on his legs, body and arm started approximately two weeks after he stopped his antibiotics. He and his wife say his leg was seeping severely enough three days ago that it came through the bandage on his leg (kerlix) and his pants. He has been using hydrocortosone 10 cream, then wrapping with Kerlix then an ace bandage, being careful to miss the graft scar. He has an appointment with a new PCP at the end of the month, then sees a dermatologist on September 3. Past Medical History:   Diagnosis Date    Acute MI ([facility] 2010    inferior wall    Buerger's disease ([facility]     CAD (coronary artery disease)     S/P IWMIi 4/10 w/ RCA FIONA X3, normal LVEF. Cath 11/10 nonobstructive LAD dz, RCA stent with moderate instent restenosis.  s/p OM fiona X2 12/10    Closed fracture of cervical spine ([facility]     2000; C2-3; fell off ladder    Depression     not on meds    GERD (gastroesophageal reflux disease)     managed by PCP    Hyperlipidemia     rec semi annual lipids with LDL goal <70    Hypertension     controlled    [facility]AD [facility]peripheral artery disease) ([facility])     Aortogram 90% SFA followed by Dr. Mandy WASSERMAN (postoperative nausea and vomiting)     after CABG    Wears glasses     reading       Past Surgical History:   Procedure Laterality Date    CARDIAC CATHETERIZATION      multiple    CARDIAC SURGERY      CABG--2 vessels    CORONARY ANGIOPLASTY      multiple cardiac stents    FEMORAL-TIBIAL BYPASS GRAFT Left 2020    LEFT FEMORAL TO POSTERIOR TIBIAL BYPASS WITH REVERSED LEFT ARM CEPHALIC VEIN performed by James Little MD at [address] Left 2018    stent  x 2; vascular dz; Left EIA angioplasty:  Left SFA/Pop atherectomy/angioplasty of native arteries    VASCULAR SURGERY Left 2011    IR FEMORAL POPLITEAL BYPASS GRAFT        Social History     Socioeconomic History    Marital status:      Spouse name: Not on file    Number of children: Not on file    Years of education: Not on file    Highest education level: Not on file   Occupational History    Not on file   Social Needs    Financial resource strain: Not on file    Food insecurity     Worry: Not on file     Inability: Not on file    Transportation needs     Medical: Not on file     Non-medical: Not on file   Tobacco Use    Smoking status: Former Smoker     Packs/day: 0.25     Years: 25.00     Pack years: 6.25     Types: Cigarettes     Last attempt to quit: 3/5/2014     Years since quittin.4    Smokeless tobacco: Never Used   Substance and Sexual Activity    Alcohol use:  Yes     Alcohol/week: 48.0 standard drinks     Types: 48 Cans of beer per week     Comment: patient states drinks at least 8 beers/day    Drug use: Never    Sexual activity: Yes     Partners: Female   Lifestyle    Physical activity     Days per week: Not on file     Minutes per session: Not on file    Stress: Not on file   Relationships    Social connections     Talks on phone: Not on file     Gets together: Not on file     Attends [employer] service: Not on file     Active member of club or organization: Not on file     Attends meetings of clubs or organizations: Not on file     Relationship status: Not on file    Intimate partner violence     Fear of current or ex partner: Not on file     Emotionally abused: Not on file     Physically abused: Not on file     Forced sexual activity: Not on file   Other Topics Concern    Not on file   Social History Narrative    Not on file       Family History   Problem Relation Age of Onset    Heart Disease Mother     Diabetes Father     High Blood Pressure Father     High Blood Pressure Brother     High Blood Pressure Paternal Uncle     Heart Disease Paternal Uncle     Stroke Paternal Uncle     Diabetes Paternal Uncle          Current Outpatient Medications:     methylPREDNISolone (MEDROL DOSEPACK) 4 MG tablet, Use as directed on package, Disp: 1 kit, Rfl: 0    simvastatin (ZOCOR) 40 MG tablet, TAKE 1 TABLET NIGHTLY, Disp: 90 tablet, Rfl: 3    metoprolol tartrate (LOPRESSOR) 25 MG tablet, TAKE 1 TABLET BY MOUTH TWICE A DAY, Disp: 180 tablet, Rfl: 3    clopidogrel (PLAVIX) 75 MG tablet, TAKE 1 TABLET BY MOUTH EVERY DAY, Disp: 90 tablet, Rfl: 3    amLODIPine (NORVASC) 5 MG tablet, TAKE 1 TABLET BY MOUTH EVERY DAY, Disp: 90 tablet, Rfl: 3    nitroGLYCERIN (NITROSTAT) 0.4 MG SL tablet, Place 1 tablet under the tongue every 5 minutes as needed for Chest pain (Patient taking differently: Place 0.4 mg under the tongue every 5 minutes as needed for Chest pain ), Disp: 25 tablet, Rfl: 3    aspirin 325 MG tablet, Take 325 mg by mouth daily. , Disp: , Rfl:     Patient has no known allergies.     Vitals:    08/25/20 1026 08/25/20 1027   BP: (!) 163/93 (!) 158/92   Site: Right Upper Arm Left Upper Arm   Position: Sitting Sitting   Cuff Size: Medium Adult Medium Adult       Orders Only on 07/09/2020   Component Date Value Ref Range Status    Cholesterol, Total 07/09/2020 184  125-200^125^200 mg/dL Final    HDL 07/09/2020 109  >=40^>=40 mg/dL Final    Comment: National Cholesterol Education Program Adult Treatment Panel Guidelines (ATPIII):  TOTAL CHOLESTEROL:                      LESS THAN 200 mg/dL Desirable                     200-239 mg/dL Borderline                     GREATER OR EQUAL  mg/dL High  TRIGLYCERIDE:                      LESS THAN 150 mg/dL Normal                     150-199 mg/dL Borderline High                     200-499 mg/dL High                     GREATER OR EQUAL  mg/dL Very High  HDL CHOLESTEROL:                      LESS THAN 40 mg/dL Low                     GREATER THAN 60 mg/dL Desirable  LDL CHOLESTEROL:                     LESS THAN 100 mg/dL Optimal                     100-129 mg/dL Near optimal/above optimal                     130-159 mg/dL Borderline High                     160-189 mg/dL High                     GREATER OR EQUAL  mg/dL Very High  NOTE: CLINICAL DECISION MAKING, INCLUDING POSSIBLE TREATMENT, WILL DEPEND ON RISK FACTOR   ANALYSIS AND CONSULTATION WITH A PHYSICIAN.  LDL Calculated 07/09/2020 58  <=100^^<=100 mg/dL Final    Triglycerides 07/09/2020 83  <=150^^<=150 mg/dL Final       Review of Systems   Constitutional: Negative. HENT: Negative. Eyes: Positive for visual disturbance (wears glasses). Negative for photophobia, pain, discharge, redness and itching. Respiratory: Negative. Cardiovascular: Positive for leg swelling (left leg swelling). Negative for chest pain and palpitations. Gastrointestinal: Negative. Endocrine: Negative. Genitourinary: Negative. Musculoskeletal: Negative. Skin: Negative. Allergic/Immunologic: Negative. Neurological: Negative. Hematological: Negative. Psychiatric/Behavioral: Negative. All other systems reviewed and are negative. Physical Exam  Vitals signs reviewed. Constitutional:       Appearance: He is well-developed. HENT:      Head: Normocephalic. Eyes:      Pupils: Pupils are equal, round, and reactive to light. Neck:      Musculoskeletal: Normal range of motion and neck supple. Vascular: No carotid bruit. Cardiovascular:      Rate and Rhythm: Normal rate and regular rhythm. Pulses:           Carotid pulses are 2+ on the right side with bruit and 2+ on the left side. Radial pulses are 2+ on the right side and 2+ on the left side. Femoral pulses are 2+ on the right side and 2+ on the left side with bruit. Popliteal pulses are 1+ on the right side and 2+ on the left side. Dorsalis pedis pulses are 1+ on the right side and 1+ on the left side. Posterior tibial pulses are 1+ on the right side and 2+ on the left side. Heart sounds: Normal heart sounds.       Comments: MEASUREMENTS 8/25/2020:    RIGHT ANKLE: 19.1 cm   RIGHT CALF: 33.6 cm     LEFT ANKLE: 22.4 cm   LEFT CALF: 34.8 cm     Doppler 8/25/2020:  Rt DP: biphasic  Rt PT: monophasic  Rt AT:     Lt DP: monophasic (strong)  Lt PT: biphasic (strong)  Lt AT:      MEASUREMENTS 7/24/2020:    RIGHT ANKLE: 19.8 cm   RIGHT CALF: 34.2 cm     LEFT ANKLE: 23.7 cm   LEFT CALF: 38.0 cm       Doppler 7/24/2020:  Rt DP: biphasic  Rt PT:  monophasic  Rt AT:     Lt DP: none found  Lt PT: biphasic  Lt AT: monophasic, found above crease of the foot  STRONG GRAFT SIGNAL      7/10/2020  +2 of graft at knee  MEASUREMENTS 7/10/2020:    RIGHT ANKLE: 19.2 cm  RIGHT CALF: 34.2 cm    LEFT ANKLE: 22.3 cm  LEFT CALF: 36.0 cm       Doppler 7/10/2020:  Rt DP: monophasic (strong)  Rt PT: monophasic  Rt AT:     Lt DP: biphasic  Lt PT: monophasic (very strong)  Lt AT:    Bypass graft very strong    Doppler 1/21/2020:  Rt DP:  Biphasic  Rt PT: monophasic (strong)   Rt AT:     Lt DP: monophasic (weak)  Lt PT: monophasic (very weak)  Lt AT:          Doppler 11/15/19  Rt DP:   Rt PT:   Rt AT:     Lt DP: monophasic  Lt PT: monophasic  Lt AT:      Doppler 10/25/19:  Rt DP: biphasic  Rt PT: monophasic (weak)  Rt AT:     Lt DP: monophasic  Lt PT: monophasic  Lt AT:    ~~~previous visit~~~~      Doppler signals in right DP and PT with waveforms obtained  Normal multiphasic DP doppler signal in left foot  Left PT signal also multiphasic  Swelling of revascularization to left foot and ankle and lower leg  Skin remains intact  Varicose veins at the left ankle region      Ankle Brachial Index Report described in this documentation as scribed by the Medical Assistant Rhoda Alvarez in my presence and it is both accurate and complete.         Electronically signed by Layla Humphries MD on 8/25/2020 at 11:15 AM

## 2020-12-04 ENCOUNTER — OFFICE VISIT (OUTPATIENT)
Dept: SURGERY | Age: 50
End: 2020-12-04
Payer: COMMERCIAL

## 2020-12-04 VITALS
HEART RATE: 72 BPM | WEIGHT: 178 LBS | DIASTOLIC BLOOD PRESSURE: 77 MMHG | SYSTOLIC BLOOD PRESSURE: 132 MMHG | BODY MASS INDEX: 23.48 KG/M2

## 2020-12-04 PROBLEM — R09.89 RIGHT CAROTID BRUIT: Status: ACTIVE | Noted: 2020-12-04

## 2020-12-04 PROCEDURE — 99214 OFFICE O/P EST MOD 30 MIN: CPT | Performed by: SURGERY

## 2020-12-04 RX ORDER — CETIRIZINE HYDROCHLORIDE 10 MG/1
10 TABLET ORAL 2 TIMES DAILY
COMMUNITY
Start: 2020-09-03

## 2020-12-04 ASSESSMENT — ENCOUNTER SYMPTOMS
GASTROINTESTINAL NEGATIVE: 1
EYE REDNESS: 0
EYE PAIN: 0
PHOTOPHOBIA: 0
ALLERGIC/IMMUNOLOGIC NEGATIVE: 1
RESPIRATORY NEGATIVE: 1
EYE ITCHING: 0
EYE DISCHARGE: 0

## 2020-12-04 NOTE — PATIENT INSTRUCTIONS
Please schedule to return in three months for a left lower extremity arterial duplex and a bilateral carotid artery duplex and office visit.

## 2020-12-04 NOTE — PROGRESS NOTES
Daily Progress Note   Marc Clark MD      12/4/2020    Chief Complaint   Patient presents with    Leg Pain     Patient here for 4 month PAD follow up. Patient has seen significant improvement since last visit. He wears a thigh high or ACE wrap when he notices swelling.  Leg Swelling         HISTORY OF PRESENT ILLNESS:                The patient is a 52 y.o. male who presents with a 4 month follow up for PAD. Past Medical History:   Diagnosis Date    Acute MI ([facility] 2010    inferior wall    Buerger's disease ([facility]     CAD (coronary artery disease)     S/P IWMIi 4/10 w/ RCA FIONA X3, normal LVEF. Cath 11/10 nonobstructive LAD dz, RCA stent with moderate instent restenosis.  s/p OM fiona X2 12/10    Closed fracture of cervical spine ([facility]     2000; C2-3; fell off ladder    Depression     not on meds    GERD (gastroesophageal reflux disease)     managed by PCP    Hyperlipidemia     rec semi annual lipids with LDL goal <70    Hypertension     controlled    Ischemic ulcer of lower leg due to atherosclerotic disease ([facility]     PAD (peripheral artery disease) ([facility]     Aortogram 90% SFA followed by Dr. Robles Stapleton PAD (peripheral artery disease) ([facility] 7/12/2011    PONV (postoperative nausea and vomiting)     after CABG    Wears glasses     reading       Past Surgical History:   Procedure Laterality Date    CARDIAC CATHETERIZATION      multiple    CARDIAC SURGERY      CABG--2 vessels    CORONARY ANGIOPLASTY      multiple cardiac stents    FEMORAL-TIBIAL BYPASS GRAFT Left 2/5/2020    LEFT FEMORAL TO POSTERIOR TIBIAL BYPASS WITH REVERSED LEFT ARM CEPHALIC VEIN [facility] P[facility] 07/28/2018    stent  x 2; vascular dz; Left EIA angioplasty:  Left SFA/Pop atherectomy/angioplasty of native arteries    VASCULAR SURGERY Left 07/21/2011    IR FEMORAL POPLITEAL BYPASS GRAFT        Social History     Socioeconomic History    Marital status:      Spouse name: tablet, Rfl: 3    metoprolol tartrate (LOPRESSOR) 25 MG tablet, TAKE 1 TABLET BY MOUTH TWICE A DAY, Disp: 180 tablet, Rfl: 3    clopidogrel (PLAVIX) 75 MG tablet, TAKE 1 TABLET BY MOUTH EVERY DAY, Disp: 90 tablet, Rfl: 3    amLODIPine (NORVASC) 5 MG tablet, TAKE 1 TABLET BY MOUTH EVERY DAY, Disp: 90 tablet, Rfl: 3    nitroGLYCERIN (NITROSTAT) 0.4 MG SL tablet, Place 1 tablet under the tongue every 5 minutes as needed for Chest pain (Patient taking differently: Place 0.4 mg under the tongue every 5 minutes as needed for Chest pain ), Disp: 25 tablet, Rfl: 3    aspirin 325 MG tablet, Take 325 mg by mouth daily. , Disp: , Rfl:     cetirizine (ZYRTEC) 10 MG tablet, TAKE 1/2 TABLET BY MOUTH EVERY DAY, Disp: , Rfl:     Patient has no known allergies.     Vitals:    12/04/20 0913   BP: 132/77   Pulse: 72   Weight: 178 lb (80.7 kg)       Orders Only on 07/09/2020   Component Date Value Ref Range Status    Cholesterol, Total 07/09/2020 184  125-200^125^200 mg/dL Final    HDL 07/09/2020 109  >=40^>=40 mg/dL Final    Comment: National Cholesterol Education Program Adult Treatment Panel Guidelines (ATPIII):  TOTAL CHOLESTEROL:                      LESS THAN 200 mg/dL Desirable                     200-239 mg/dL Borderline                     GREATER OR EQUAL  mg/dL High  TRIGLYCERIDE:                      LESS THAN 150 mg/dL Normal                     150-199 mg/dL Borderline High                     200-499 mg/dL High                     GREATER OR EQUAL  mg/dL Very High  HDL CHOLESTEROL:                      LESS THAN 40 mg/dL Low                     GREATER THAN 60 mg/dL Desirable  LDL CHOLESTEROL:                     LESS THAN 100 mg/dL Optimal                     100-129 mg/dL Near optimal/above optimal                     130-159 mg/dL Borderline High                     160-189 mg/dL High                     GREATER OR EQUAL  mg/dL Very High  NOTE: CLINICAL DECISION MAKING, INCLUDING POSSIBLE TREATMENT, WILL DEPEND ON RISK FACTOR   ANALYSIS AND CONSULTATION WITH A PHYSICIAN.  LDL Calculated 07/09/2020 58  <=100^^<=100 mg/dL Final    Triglycerides 07/09/2020 83  <=150^^<=150 mg/dL Final       Review of Systems   Constitutional: Negative. HENT: Negative. Eyes: Positive for visual disturbance (wears glasses). Negative for photophobia, pain, discharge, redness and itching. Respiratory: Negative. Cardiovascular: Positive for leg swelling (left leg swelling). Negative for chest pain and palpitations. Gastrointestinal: Negative. Endocrine: Negative. Genitourinary: Negative. Musculoskeletal: Negative. Skin: Negative. Allergic/Immunologic: Negative. Neurological: Negative. Hematological: Negative. Psychiatric/Behavioral: Negative. All other systems reviewed and are negative. Physical Exam  Vitals signs reviewed. Constitutional:       Appearance: He is well-developed. HENT:      Head: Normocephalic. Eyes:      Pupils: Pupils are equal, round, and reactive to light. Neck:      Musculoskeletal: Normal range of motion and neck supple. Vascular: No carotid bruit. Cardiovascular:      Rate and Rhythm: Normal rate and regular rhythm. Pulses:           Carotid pulses are 2+ on the right side with bruit and 2+ on the left side. Radial pulses are 2+ on the right side and 2+ on the left side. Femoral pulses are 1+ on the right side and 2+ on the left side with bruit. Popliteal pulses are 1+ on the right side and 2+ on the left side. Dorsalis pedis pulses are 1+ on the right side and 2+ on the left side. Posterior tibial pulses are 1+ on the right side and 2+ on the left side. Heart sounds: Normal heart sounds.       Comments: 12/4/2020  Left leg graft +2    Doppler 12/4/2020:  Rt DP: monophasic  Rt PT: monophasic  Rt AT: not check    Lt DP: monophasic (strong)  Lt PT: biphasic  Lt AT: not checked    Left leg graft: biphasic      MEASUREMENTS 8/25/2020:    RIGHT ANKLE: 19.1 cm   RIGHT CALF: 33.6 cm     LEFT ANKLE: 22.4 cm   LEFT CALF: 34.8 cm     Doppler 8/25/2020:  Rt DP: biphasic  Rt PT: monophasic  Rt AT:     Lt DP: monophasic (strong)  Lt PT: biphasic (strong)  Lt AT:      MEASUREMENTS 7/24/2020:    RIGHT ANKLE: 19.8 cm   RIGHT CALF: 34.2 cm     LEFT ANKLE: 23.7 cm   LEFT CALF: 38.0 cm       Doppler 7/24/2020:  Rt DP: biphasic  Rt PT:  monophasic  Rt AT:     Lt DP: none found  Lt PT: biphasic  Lt AT: monophasic, found above crease of the foot  STRONG GRAFT SIGNAL      7/10/2020  +2 of graft at knee  MEASUREMENTS 7/10/2020:    RIGHT ANKLE: 19.2 cm  RIGHT CALF: 34.2 cm    LEFT ANKLE: 22.3 cm  LEFT CALF: 36.0 cm       Doppler 7/10/2020:  Rt DP: monophasic (strong)  Rt PT: monophasic  Rt AT:     Lt DP: biphasic  Lt PT: monophasic (very strong)  Lt AT:    Bypass graft very strong    Doppler 1/21/2020:  Rt DP:  Biphasic  Rt PT: monophasic (strong)   Rt AT:     Lt DP: monophasic (weak)  Lt PT: monophasic (very weak)  Lt AT:          Doppler 11/15/19  Rt DP:   Rt PT:   Rt AT:     Lt DP: monophasic  Lt PT: monophasic  Lt AT:      Doppler 10/25/19:  Rt DP: biphasic  Rt PT: monophasic (weak)  Rt AT:     Lt DP: monophasic  Lt PT: monophasic  Lt AT:    ~~~previous visit~~~~      Doppler signals in right DP and PT with waveforms obtained  Normal multiphasic DP doppler signal in left foot  Left PT signal also multiphasic  Swelling of revascularization to left foot and ankle and lower leg  Skin remains intact  Varicose veins at the left ankle region      Ankle Brachial Index Report 8/13/18     Right  Left  Arm   167   168   Posterior Tibial 94   WNO   Dorsalis Pedis 102  160    LISA:   0.61  0.96    Ankle Brachial Index Report 9/17/18     Right  Left  Arm   164   172   Posterior Tibial 104   158   Dorsalis Pedis 92  148    LISA:   0.60  0.92      Pulmonary:      Effort: Pulmonary effort is normal. No respiratory distress.       Breath sounds: Normal breath sounds. No wheezing or rales. Abdominal:      General: There is no distension. Palpations: There is no mass. Tenderness: There is no abdominal tenderness. There is no guarding. Musculoskeletal:         General: No tenderness. Arms:       Left lower leg: He exhibits swelling. Legs:       Left foot: Swelling present. Skin:     General: Skin is warm. Neurological:      Cranial Nerves: No cranial nerve deficit. Sensory: Sensory deficit (hypersensitive from revascularization effect) present. Coordination: Coordination normal.      Gait: Gait normal.           ASSESSMENT:    Problem List Items Addressed This Visit     Right carotid bruit    [facility] ([facility] - Primary          PLAN:    Please schedule to return in three months for a left lower extremity arterial duplex and a bilateral carotid artery duplex and office visit. His last carotid showed wide open right side 16 to 49% left could not measure because the acoustic shadowing    I Rehana Alvarez MA am scribing for and in the presence of Yvon Powell MD on this date of 12/04/20    I Daiana Lovett MD personally performed the services described in this documentation as scribed by the Medical Assistant Rehana Alvarez in my presence and it is both accurate and complete.         Electronically signed by Yvon Powell MD on 12/4/2020 at 9:54 AM

## 2021-02-03 NOTE — PROGRESS NOTES
[facility]  1970 February 4, 2021      CC: \"I feel well\"     HPI:  The patient is 54 y.o. male with a past medical history significant for CAD prior CABG (3/2014), hyperlipidemia, hypertension and PAD. He was previously following with my former partner Dr. Semaj Romero and presents today to establish care. Today, he reports feeling well from a cardiac standpoint. He follows with Dr. Angle Leggett regarding his peripheral disease. He does not participate in any regular exercise. He is able to walk over 100 yards without leg pain or shortness of breath. He denies chest pain with rest or exertion. His breathing has been comfortable without shortness of breath. He was a former smoker and quit in 2014. Review of Systems:  Constitutional: No fatigue, weakness, night sweats or fever. HEENT: No new vision difficulties or ringing in the ears. Respiratory: No new SOB, PND, orthopnea or cough. Cardiovascular: See HPI   GI: No n/v, diarrhea, constipation, abdominal pain or changes in bowel habits. No melena, no hematochezia  : No urinary frequency, urgency, incontinence, hematuria or dysuria. Skin: No cyanosis or skin lesions. Musculoskeletal: No new muscle or joint pain. Neurological: No syncope or TIA-like symptoms. Psychiatric: No anxiety, insomnia or depression     Past Medical History:   Diagnosis Date    Acute MI ([facility] [address] 2010    inferior wall    Buerger's disease ([facility] [facility]     CAD (coronary artery disease)     S/P IWMIi 4/10 w/ RCA FIONA X3, normal LVEF. Cath 11/10 nonobstructive LAD dz, RCA stent with moderate instent restenosis.  s/p OM fiona X2 12/10    Closed fracture of cervical spine ([facility] [address]     2000; C2-3; fell off ladder    Depression     not on meds    Erythrodermic psoriasis     GERD (gastroesophageal reflux disease)     managed by PCP    Hyperlipidemia     rec semi annual lipids with LDL goal <70    Hypertension     controlled    Ischemic ulcer of lower leg due to atherosclerotic disease ([facility] [address]     [facility]per[facility]     Aortogram 90% SFA followed by Dr. Shannon Cavanaugh PAD (peripheral artery disease) ([facility] [address] 2011    PONV (postoperative nausea and vomiting)     after CABG    Wears glasses     reading     Past Surgical History:   Procedure Laterality Date    CARDIAC CATHETERIZATION      multiple    CARDIAC SURGERY      CABG--2 vessels    CORONARY ANGIOPLASTY      multiple cardiac stents    FEMORAL-TIBIAL BYPASS GRAFT Left 2020    LEFT FEMORAL TO POSTERIOR TIBIAL BYPASS WITH REVERSED LEFT ARM CEPHALIC VEIN [facility] [address] [facility] Left 2018    stent  x 2; vascular dz; Left EIA angioplasty:  Left SFA/Pop atherectomy/angioplasty of native arteries    VASCULAR SURGERY Left 2011    IR FEMORAL POPLITEAL BYPASS GRAFT      Family History   Problem Relation Age of Onset    Heart Disease Mother     Diabetes Father     High Blood Pressure Father     High Blood Pressure Brother     High Blood Pressure Paternal Uncle     Heart Disease Paternal Uncle     Stroke Paternal Uncle     Diabetes Paternal Uncle      Social History     Tobacco Use    Smoking status: Former Smoker     Packs/day: 0.25     Years: 25.00     Pack years: 6.25     Types: Cigarettes     Quit date: 3/5/2014     Years since quittin.9    Smokeless tobacco: Never Used   Substance Use Topics    Alcohol use:  Yes     Alcohol/week: 48.0 standard drinks     Types: 48 Cans of beer per week     Comment: patient states drinks at least 8 beers/day    Drug use: Never       Allergies   Allergen Reactions    Sulfa Antibiotics      Current Outpatient Medications   Medication Sig Dispense Refill    cetirizine (ZYRTEC) 10 MG tablet 10 mg 2 times daily       simvastatin (ZOCOR) 40 MG tablet TAKE 1 TABLET NIGHTLY 90 tablet 3    metoprolol tartrate (LOPRESSOR) 25 MG tablet TAKE 1 TABLET BY MOUTH TWICE A  tablet 3    clopidogrel (PLAVIX) 75 MG tablet TAKE 1 TABLET BY MOUTH EVERY DAY 90 tablet 3    amLODIPine (NORVASC) 5 MG tablet TAKE 1 TABLET BY MOUTH EVERY DAY 90 tablet 3    nitroGLYCERIN (NITROSTAT) 0.4 MG SL tablet Place 1 tablet under the tongue every 5 minutes as needed for Chest pain (Patient taking differently: Place 0.4 mg under the tongue every 5 minutes as needed for Chest pain ) 25 tablet 3    aspirin 325 MG tablet Take 325 mg by mouth daily. No current facility-administered medications for this visit. Physical Exam:   BP (!) 153/89   Pulse 70   Temp 97.8 °F (36.6 °C)   Ht 6' 1\" (1.854 m)   Wt 179 lb (81.2 kg)   SpO2 97%   BMI 23.62 kg/m²   No intake or output data in the 24 hours ending 02/04/21 1451  Wt Readings from Last 2 Encounters:   02/04/21 179 lb (81.2 kg)   12/04/20 178 lb (80.7 kg)     Constitutional: He is oriented to person, place, and time. He appears well-developed and well-nourished. In no acute distress. Head: Normocephalic and atraumatic. Neck: Neck supple. No JVD present. Carotid bruit is not present. No mass and no thyromegaly present. No lymphadenopathy present. Cardiovascular: Normal rate, regular rhythm, normal heart sounds and intact distal pulses. Exam reveals no gallop and no friction rub. No murmur heard. Pulmonary/Chest: Effort normal and breath sounds normal. No respiratory distress. He has no wheezes, rhonchi or rales. Abdominal: Soft, non-tender. Bowel sounds and aorta are normal. He exhibits no organomegaly, mass or bruit. Extremities: No edema, cyanosis, or clubbing. Pulses are 2+ radial/carotid/dorsalis pedis and posterior tibial bilaterally. Neurological: He is alert and oriented to person, place, and time. He has normal reflexes. No cranial nerve deficit. Coordination normal.   Skin: Skin is warm and dry. There is no rash or diaphoresis. Psychiatric: He has a normal mood and affect.  His speech is normal and behavior is normal.     EKG Interpretation:     Lab Review:   Lab Results   Component Value Date    TRIG 83 07/09/2020     07/09/2020    LDLCALC 58 07/09/2020    LABVLDL 10 03/05/2014     Lab Results   Component Value Date     07/09/2020    K 4.3 07/09/2020    K 4.3 02/05/2020    BUN 8 07/09/2020    CREATININE 0.81 07/09/2020     No results for input(s): WBC, HGB, HCT, PLT in the last 72 hours. Assessment:  1. CAD of native coronary arteries without angina  2. Hyperlipidemia with LDL goal <70 mg/dL   3. Essential hypertension   4. PAD    Plan:  I think that Mr. Tori Erazo  is entirely stable from a cardiovascular standpoint. I see no need to make any changes currently in his medical regimen or pursue further testing. He is not endorsing any symptoms representing angina and his most recent lipid profile was favorable. I have encouraged him to increase his aerobic activity as tolerated and adhere to a heart healthy diet. I reviewed all of his prior cardiac testing for this initial visit. I will see him in the office for follow up in 1 year. This note was scribed in the presence of Montse Arboleda MD by [employer], RN. Physician Attestation:  The scribes documentation has been prepared under my direction and personally reviewed by me in its entirety. I, Dr. Nica Ramirez personally performed the services described in this documentation as scribed by my RN in my presence, and I confirm that the note above accurately reflects all work, treatment, procedures, and medical decision making performed by me.

## 2021-02-04 ENCOUNTER — OFFICE VISIT (OUTPATIENT)
Dept: CARDIOLOGY CLINIC | Age: 51
End: 2021-02-04
Payer: COMMERCIAL

## 2021-02-04 VITALS
HEIGHT: 73 IN | OXYGEN SATURATION: 97 % | TEMPERATURE: 97.8 F | HEART RATE: 70 BPM | SYSTOLIC BLOOD PRESSURE: 153 MMHG | BODY MASS INDEX: 23.72 KG/M2 | DIASTOLIC BLOOD PRESSURE: 89 MMHG | WEIGHT: 179 LBS

## 2021-02-04 DIAGNOSIS — I73.9 PAD (PERIPHERAL ARTERY DISEASE) (HCC): ICD-10-CM

## 2021-02-04 DIAGNOSIS — I10 ESSENTIAL HYPERTENSION: ICD-10-CM

## 2021-02-04 DIAGNOSIS — I25.10 CORONARY ARTERY DISEASE INVOLVING NATIVE CORONARY ARTERY OF NATIVE HEART WITHOUT ANGINA PECTORIS: Primary | ICD-10-CM

## 2021-02-04 DIAGNOSIS — E78.5 HYPERLIPIDEMIA LDL GOAL <70: ICD-10-CM

## 2021-02-04 PROCEDURE — 93000 ELECTROCARDIOGRAM COMPLETE: CPT | Performed by: INTERNAL MEDICINE

## 2021-02-04 PROCEDURE — 99214 OFFICE O/P EST MOD 30 MIN: CPT | Performed by: INTERNAL MEDICINE

## 2021-04-09 ENCOUNTER — OFFICE VISIT (OUTPATIENT)
Dept: SURGERY | Age: 51
End: 2021-04-09
Payer: COMMERCIAL

## 2021-04-09 ENCOUNTER — PROCEDURE VISIT (OUTPATIENT)
Dept: SURGERY | Age: 51
End: 2021-04-09
Payer: COMMERCIAL

## 2021-04-09 VITALS
HEIGHT: 73 IN | WEIGHT: 185 LBS | BODY MASS INDEX: 24.52 KG/M2 | DIASTOLIC BLOOD PRESSURE: 86 MMHG | SYSTOLIC BLOOD PRESSURE: 148 MMHG

## 2021-04-09 DIAGNOSIS — Z98.62 STATUS POST PERIPHERAL ARTERY ANGIOPLASTY: Primary | ICD-10-CM

## 2021-04-09 DIAGNOSIS — I10 ESSENTIAL HYPERTENSION: ICD-10-CM

## 2021-04-09 DIAGNOSIS — I70.222 ATHEROSCLEROSIS OF NATIVE ARTERIES OF EXTREMITIES WITH REST PAIN, LEFT LEG (HCC): ICD-10-CM

## 2021-04-09 DIAGNOSIS — E78.00 PURE HYPERCHOLESTEROLEMIA: ICD-10-CM

## 2021-04-09 DIAGNOSIS — I73.9 PAD (PERIPHERAL ARTERY DISEASE) (HCC): Primary | ICD-10-CM

## 2021-04-09 DIAGNOSIS — I65.23 CAROTID STENOSIS, ASYMPTOMATIC, BILATERAL: ICD-10-CM

## 2021-04-09 DIAGNOSIS — I73.9 PAD (PERIPHERAL ARTERY DISEASE) (HCC): ICD-10-CM

## 2021-04-09 DIAGNOSIS — R09.89 RIGHT CAROTID BRUIT: ICD-10-CM

## 2021-04-09 PROCEDURE — 99214 OFFICE O/P EST MOD 30 MIN: CPT | Performed by: SURGERY

## 2021-04-09 PROCEDURE — 93926 LOWER EXTREMITY STUDY: CPT | Performed by: SURGERY

## 2021-04-09 PROCEDURE — 93880 EXTRACRANIAL BILAT STUDY: CPT | Performed by: SURGERY

## 2021-04-09 NOTE — PROGRESS NOTES
Daily Progress Note   Tawanda Smallwood MD      4/9/2021    Chief Complaint   Patient presents with    Follow-up     3m for Bilateral cds & LLE ADS. pt states no new complaints. HISTORY OF PRESENT ILLNESS:                The patient is a 54 y.o. male who presents with a 3 month follow up visit for LLE arterial duplex scan, and to scan his carotid arteries. Carotid results show no change from his previous scan done 1/21/20: 21% and 25% right and left      Past Medical History:   Diagnosis Date    Acute MI ([facility] [address] 2010    inferior wall    Buerger's disease ([facility] [address]     CAD (coronary artery disease)     S/P IWMIi 4/10 w/ RCA FIONA X3, normal LVEF. Cath 11/10 nonobstructive LAD dz, RCA stent with moderate instent restenosis.  s/p OM fiona X2 12/10    Closed fracture of cervical spine ([facility] [address]     2000; C2-3; fell off ladder    Depression     not on meds    Erythrodermic psoriasis     GERD (gastroesophageal reflux disease)     managed by PCP    Hyperlipidemia     rec semi annual lipids with LDL goal <70    Hypertension     controlled    Ischemic ulcer of lower leg due to atherosclerotic disease ([facility] [address]     [facility]AD [facility]peripheral artery disease) ([facility])     Aortogram 90% SFA followed by Dr. Lauro Guthrie [facility]12[facility]2011    PONV (postoperative nausea and vomiting)     after CABG    Wears glasses     reading       Past Surgical History:   Procedure Laterality Date    CARDIAC CATHETERIZATION      multiple    CARDIAC SURGERY      CABG--2 vessels    CORONARY ANGIOPLASTY      multiple cardiac stents    FEMORAL-TIBIAL BYPASS GRAFT Left 2/5/2020    LEFT FEMORAL TO POSTERIOR TIBIAL BYPASS WITH REVERSED LEFT ARM CEPHALIC VEIN [facility] [address] [facility] Left 07/28/2018    stent  x 2; vascular dz; Left EIA angioplasty:  Left SFA/Pop atherectomy/angioplasty of native arteries    VASCULAR SURGERY Left 07/21/2011    IR FEMORAL POPLITEAL BYPASS GRAFT        Social History     Socioeconomic History    Marital status:      Spouse name: Not on file    Number of children: Not on file    Years of education: Not on file    Highest education level: Not on file   Occupational History    Not on file   Social Needs    Financial resource strain: Not on file    Food insecurity     Worry: Not on file     Inability: Not on file    Transportation needs     Medical: Not on file     Non-medical: Not on file   Tobacco Use    Smoking status: Former Smoker     Packs/day: 0.25     Years: 25.00     Pack years: 6.25     Types: Cigarettes     Quit date: 3/5/2014     Years since quittin.1    Smokeless tobacco: Never Used   Substance and Sexual Activity    Alcohol use:  Yes     Alcohol/week: 48.0 standard drinks     Types: 48 Cans of beer per week     Comment: patient states drinks at least 8 beers/day    Drug use: Never    Sexual activity: Yes     Partners: Female   Lifestyle    Physical activity     Days per week: Not on file     Minutes per session: Not on file    Stress: Not on file   Relationships    Social connections     Talks on phone: Not on file     Gets together: Not on file     Attends [employer] service: Not on file     Active member of club or organization: Not on file     Attends meetings of clubs or organizations: Not on file     Relationship status: Not on file    Intimate partner violence     Fear of current or ex partner: Not on file     Emotionally abused: Not on file     Physically abused: Not on file     Forced sexual activity: Not on file   Other Topics Concern    Not on file   Social History Narrative    Not on file       Family History   Problem Relation Age of Onset    Heart Disease Mother     Diabetes Father     High Blood Pressure Father     High Blood Pressure Brother     High Blood Pressure Paternal Uncle     Heart Disease Paternal Uncle     Stroke Paternal Uncle     Diabetes Paternal Uncle          Current Outpatient Medications:    cetirizine (ZYRTEC) 10 MG tablet, 10 mg 2 times daily , Disp: , Rfl:     simvastatin (ZOCOR) 40 MG tablet, TAKE 1 TABLET NIGHTLY, Disp: 90 tablet, Rfl: 3    metoprolol tartrate (LOPRESSOR) 25 MG tablet, TAKE 1 TABLET BY MOUTH TWICE A DAY, Disp: 180 tablet, Rfl: 3    clopidogrel (PLAVIX) 75 MG tablet, TAKE 1 TABLET BY MOUTH EVERY DAY, Disp: 90 tablet, Rfl: 3    amLODIPine (NORVASC) 5 MG tablet, TAKE 1 TABLET BY MOUTH EVERY DAY, Disp: 90 tablet, Rfl: 3    nitroGLYCERIN (NITROSTAT) 0.4 MG SL tablet, Place 1 tablet under the tongue every 5 minutes as needed for Chest pain (Patient taking differently: Place 0.4 mg under the tongue every 5 minutes as needed for Chest pain ), Disp: 25 tablet, Rfl: 3    aspirin 325 MG tablet, Take 325 mg by mouth daily.   , Disp: , Rfl:     Sulfa antibiotics    Vitals:    04/09/21 1540 04/09/21 1541   BP: (!) 148/86 (!) 148/86   Site: Right Upper Arm Left Upper Arm   Weight: 185 lb (83.9 kg)    Height: 6' 1\" (1.854 m)        Orders Only on 07/09/2020   Component Date Value Ref Range Status    Cholesterol, Total 07/09/2020 184  125-200^125^200 mg/dL Final    HDL 07/09/2020 109  >=40^>=40 mg/dL Final    Comment: National Cholesterol Education Program Adult Treatment Panel Guidelines (ATPIII):  TOTAL CHOLESTEROL:                      LESS THAN 200 mg/dL Desirable                     200-239 mg/dL Borderline                     GREATER OR EQUAL  mg/dL High  TRIGLYCERIDE:                      LESS THAN 150 mg/dL Normal                     150-199 mg/dL Borderline High                     200-499 mg/dL High                     GREATER OR EQUAL  mg/dL Very High  HDL CHOLESTEROL:                      LESS THAN 40 mg/dL Low                     GREATER THAN 60 mg/dL Desirable  LDL CHOLESTEROL:                     LESS THAN 100 mg/dL Optimal                     100-129 mg/dL Near optimal/above optimal                     130-159 mg/dL Borderline High                     160-189 mg/dL High                     GREATER OR EQUAL  mg/dL Very High  NOTE: CLINICAL DECISION MAKING, INCLUDING POSSIBLE TREATMENT, WILL DEPEND ON RISK FACTOR   ANALYSIS AND CONSULTATION WITH A PHYSICIAN.  LDL Calculated 07/09/2020 58  <=100^^<=100 mg/dL Final    Triglycerides 07/09/2020 83  <=150^^<=150 mg/dL Final       Review of Systems    Physical Exam  Vitals signs reviewed. Constitutional:       Appearance: He is well-developed. HENT:      Head: Normocephalic. Eyes:      Pupils: Pupils are equal, round, and reactive to light. Neck:      Musculoskeletal: Normal range of motion and neck supple. Vascular: No carotid bruit. Cardiovascular:      Rate and Rhythm: Normal rate and regular rhythm. Pulses:           Carotid pulses are 2+ on the right side with bruit and 2+ on the left side. Radial pulses are 2+ on the right side and 2+ on the left side. Femoral pulses are 1+ on the right side and 2+ on the left side with bruit. Popliteal pulses are 1+ on the right side and 2+ on the left side. Dorsalis pedis pulses are 2+ on the right side and 2+ on the left side. Posterior tibial pulses are 0 on the right side and 2+ on the left side. Heart sounds: Normal heart sounds.       Comments:     Doppler 4/9/2021:  Rt DP: monophasic (strong)  Rt PT: monophasic  Rt AT: not checked    Lt DP: monophasic   Lt PT: biphasic  Lt AT: not checked    Graft signal: monophasic (strong)      4/9/21  Left leg graft +2    12/4/2020  Left leg graft +2    Doppler 12/4/2020:  Rt DP: monophasic  Rt PT: monophasic  Rt AT: not check    Lt DP: monophasic (strong)  Lt PT: biphasic  Lt AT: not checked    Left leg graft: biphasic      MEASUREMENTS 8/25/2020:    RIGHT ANKLE: 19.1 cm   RIGHT CALF: 33.6 cm     LEFT ANKLE: 22.4 cm   LEFT CALF: 34.8 cm     Doppler 8/25/2020:  Rt DP: biphasic  Rt PT: monophasic  Rt AT:     Lt DP: monophasic (strong)  Lt PT: biphasic (strong)  Lt AT:      MEASUREMENTS 7/24/2020:    RIGHT ANKLE: 19.8 cm   RIGHT CALF: 34.2 cm     LEFT ANKLE: 23.7 cm   LEFT CALF: 38.0 cm       Doppler 7/24/2020:  Rt DP: biphasic  Rt PT:  monophasic  Rt AT:     Lt DP: none found  Lt PT: biphasic  Lt AT: monophasic, found above crease of the foot  STRONG GRAFT SIGNAL      7/10/2020  +2 of graft at knee  MEASUREMENTS 7/10/2020:    RIGHT ANKLE: 19.2 cm  RIGHT CALF: 34.2 cm    LEFT ANKLE: 22.3 cm  LEFT CALF: 36.0 cm       Doppler 7/10/2020:  Rt DP: monophasic (strong)  Rt PT: monophasic  Rt AT:     Lt DP: biphasic  Lt PT: monophasic (very strong)  Lt AT:    Bypass graft very strong    Doppler 1/21/2020:  Rt DP:  Biphasic  Rt PT: monophasic (strong)   Rt AT:     Lt DP: monophasic (weak)  Lt PT: monophasic (very weak)  Lt AT:          Doppler 11/15/19  Rt DP:   Rt PT:   Rt AT:     Lt DP: monophasic  Lt PT: monophasic  Lt AT:      Doppler 10/25/19:  Rt DP: biphasic  Rt PT: monophasic (weak)  Rt AT:     Lt DP: monophasic  Lt PT: monophasic  Lt AT:    ~~~previous visit~~~~      Doppler signals in right DP and PT with waveforms obtained  Normal multiphasic DP doppler signal in left foot  Left PT signal also multiphasic  Swelling of revascularization to left foot and ankle and lower leg  Skin remains intact  Varicose veins at the left ankle region      Ankle Brachial Index Report 8/13/18     Right  Left  Arm   167   168   Posterior Tibial 94   WNO   Dorsalis Pedis 102  160    LISA:   0.61  0.96    Ankle Brachial Index Report 9/17/18     Right  Left  Arm   164   172   Posterior Tibial 104   158   Dorsalis Pedis 92  148    LISA:   0.60  0.92      Pulmonary:      Effort: Pulmonary effort is normal. No respiratory distress. Breath sounds: Normal breath sounds. No wheezing or rales. Abdominal:      General: There is no distension. Palpations: There is no mass. Tenderness: There is no abdominal tenderness. There is no guarding.        Musculoskeletal:         General: No tenderness. Arms:       Left lower leg: He exhibits swelling. 3+ Pitting Edema present. Legs:       Left foot: Swelling present. Comments:   Blood blister on left baby toe  1.4 cm long x 0.4 cm wide   Skin:     General: Skin is warm. Neurological:      Cranial Nerves: No cranial nerve deficit. Sensory: Sensory deficit (hypersensitive from revascularization effect) present. Coordination: Coordination normal.      Gait: Gait normal.           ASSESSMENT:    Problem List Items Addressed This Visit     Status post peripheral artery angioplasty - Primary    Right carotid bruit    PAD (peripheral artery disease) (HCC)    Hyperlipidemia    HTN (hypertension)      Left Fem posterior tibial graft done with arm vein doing well although he does have a stenosis in the common femoral above the origin of the graft  Had multiple iliac stents we need to do an abdominal scan to check on these the right femoral is diminished left femoral has a bruit and now a stenosis by duplex  He quit smoking after his last heart surgery  His blood pressure and cholesterol seem to be under control  PLAN:     Schedule to return in four months for an aorta iliac scan. The patient will need to fast for at least 5 hours prior to the ultrasound scan. Please understand that by not doing so may result in having an inaccurate ultrasound and may cause your ultrasound to be rescheduled. Zee Caraballo MA am scribing for and in the presence of Vimal Collins MD on this date of 04/09/21    I Analia Valdez MD personally performed the services described in this documentation as scribed by the Medical Assistant Rosa Alvarez in my presence and it is both accurate and complete.         Electronically signed by Vimal Collins MD on 4/9/2021 at 4:49 PM

## 2021-04-09 NOTE — PATIENT INSTRUCTIONS
Schedule to return in four months for an aorta iliac scan. The patient will need to fast for at least 5 hours prior to the ultrasound scan. Please understand that by not doing so may result in having an inaccurate ultrasound and may cause your ultrasound to be rescheduled.

## 2021-08-06 RX ORDER — SIMVASTATIN 40 MG
TABLET ORAL
Qty: 90 TABLET | Refills: 3 | Status: SHIPPED | OUTPATIENT
Start: 2021-08-06

## 2021-08-06 RX ORDER — CLOPIDOGREL BISULFATE 75 MG/1
TABLET ORAL
Qty: 90 TABLET | Refills: 3 | Status: SHIPPED | OUTPATIENT
Start: 2021-08-06

## 2021-08-06 RX ORDER — AMLODIPINE BESYLATE 5 MG/1
TABLET ORAL
Qty: 90 TABLET | Refills: 3 | Status: SHIPPED | OUTPATIENT
Start: 2021-08-06

## 2021-08-17 ENCOUNTER — PROCEDURE VISIT (OUTPATIENT)
Dept: SURGERY | Age: 51
End: 2021-08-17
Payer: COMMERCIAL

## 2021-08-17 ENCOUNTER — OFFICE VISIT (OUTPATIENT)
Dept: SURGERY | Age: 51
End: 2021-08-17
Payer: COMMERCIAL

## 2021-08-17 VITALS — SYSTOLIC BLOOD PRESSURE: 154 MMHG | DIASTOLIC BLOOD PRESSURE: 84 MMHG | BODY MASS INDEX: 24.28 KG/M2 | WEIGHT: 184 LBS

## 2021-08-17 DIAGNOSIS — I73.9 PAD (PERIPHERAL ARTERY DISEASE) (HCC): ICD-10-CM

## 2021-08-17 DIAGNOSIS — Z98.62 STATUS POST PERIPHERAL ARTERY ANGIOPLASTY: Primary | ICD-10-CM

## 2021-08-17 DIAGNOSIS — I73.9 PAD (PERIPHERAL ARTERY DISEASE) (HCC): Primary | ICD-10-CM

## 2021-08-17 DIAGNOSIS — I70.222 ATHEROSCLEROSIS OF NATIVE ARTERIES OF EXTREMITIES WITH REST PAIN, LEFT LEG (HCC): ICD-10-CM

## 2021-08-17 PROCEDURE — 99214 OFFICE O/P EST MOD 30 MIN: CPT | Performed by: SURGERY

## 2021-08-17 PROCEDURE — 93978 VASCULAR STUDY: CPT | Performed by: STUDENT IN AN ORGANIZED HEALTH CARE EDUCATION/TRAINING PROGRAM

## 2021-08-17 ASSESSMENT — ENCOUNTER SYMPTOMS
GASTROINTESTINAL NEGATIVE: 1
RESPIRATORY NEGATIVE: 1
EYES NEGATIVE: 1

## 2021-08-17 NOTE — PROGRESS NOTES
Daily Progress Note   Sergio Begum MD      8/17/2021    Chief Complaint   Patient presents with    Other     4 month aorta iliac scan and OV. Pt states he has no concerns he wants to address today. HISTORY OF PRESENT ILLNESS:                The patient is a 54 y.o. male who presents with a four month follow up aorta iliac scan and office visit. Today Hilton Mejía has no real complaints. He says he still walks about a block and back nearly daily. He says he can walk at [employer] for extended periods of time with no pain. Despite his saying he stopped smoking, he does vape regularly. He is vaping while at the office today. He also has a very large lump at the groin on the right where he fell while getting on the riding mower at home and hit the groin on the gear shift. His scan today shows a blockage on the right above the stent. He says he has no new symptoms. Past Medical History:   Diagnosis Date    Acute MI ([facility] 2010    inferior wall    Buerger's disease ([facility] [address]     CAD (coronary artery disease)     S/P IWMIi 4/10 w/ RCA FIONA X3, normal LVEF. Cath 11/10 nonobstructive LAD dz, RCA stent with moderate instent restenosis.  s/p OM fiona X2 12/10    Closed fracture of cervical spine ([facility] [address]     2000; C2-3; fell off ladder    Depression     not on meds    Erythrodermic psoriasis     GERD (gastroesophageal reflux disease)     managed by PCP    Hyperlipidemia     rec semi annual lipids with LDL goal <70    Hypertension     controlled    Ischemic ulcer of lower leg due to atherosclerotic disease ([facility] [address]     [facility]AD [facility]peripheral [facility]) [facility]     Aortogram 90% SFA followed by Dr. Katt Chowdhury PAD (peripheral artery disease) ([facility] [address] 7/12/2011    PONV (postoperative nausea and vomiting)     after CABG    Wears glasses     reading       Past Surgical History:   Procedure Laterality Date    CARDIAC CATHETERIZATION      multiple    CARDIAC SURGERY      CABG--2 vessels    CORONARY ANGIOPLASTY multiple cardiac stents    FEMORAL-TIBIAL BYPASS GRAFT Left 2020    LEFT FEMORAL TO POSTERIOR TIBIAL BYPASS WITH REVERSED LEFT ARM CEPHALIC VEIN [facility] Thomas [facility] Left 2018    stent  x 2; vascular dz; Left EIA angioplasty:  Left SFA/Pop atherectomy/angioplasty of native arteries    VASCULAR SURGERY Left 2011    IR FEMORAL POPLITEAL BYPASS GRAFT        Social History     Socioeconomic History    Marital status:      Spouse name: Not on file    Number of children: Not on file    Years of education: Not on file    Highest education level: Not on file   Occupational History    Not on file   Tobacco Use    Smoking status: Former Smoker     Packs/day: 0.25     Years: 25.00     Pack years: 6.25     Types: Cigarettes     Quit date: 3/5/2014     Years since quittin.4    Smokeless tobacco: Never Used   Vaping Use    Vaping Use: Every day    Substances: Always   Substance and Sexual Activity    Alcohol use: Yes     Alcohol/week: 48.0 standard drinks     Types: 48 Cans of beer per week     Comment: patient states drinks at least 8 beers/day    Drug use: Never    Sexual activity: Yes     Partners: Female   Other Topics Concern    Not on file   Social History Narrative    Not on file     Social Determinants of Health     Financial Resource Strain:     Difficulty of Paying Living Expenses:    Food Insecurity:     Worried About Running Out of Food in the Last Year:     [address] in the Last Year:    Transportation Needs:     Lack of Transportation (Medical):      Lack of Transportation (Non-Medical):    Physical Activity:     Days of Exercise per Week:     Minutes of Exercise per Session:    Stress:     Feeling of Stress :    Social Connections:     Frequency of Communication with Friends and Family:     Frequency of Social Gatherings with Friends and Family:     Attends Rastafarian Services:     Active Member of Clubs or Organizations:     Attends Club or Organization Meetings:     Marital Status:    Intimate Partner Violence:     Fear of Current or Ex-Partner:     Emotionally Abused:     Physically Abused:     Sexually Abused:        Family History   Problem Relation Age of Onset    Heart Disease Mother     Diabetes Father     High Blood Pressure Father     High Blood Pressure Brother     High Blood Pressure Paternal Uncle     Heart Disease Paternal Uncle     Stroke Paternal Uncle     Diabetes Paternal Uncle          Current Outpatient Medications:     clopidogrel (PLAVIX) 75 MG tablet, TAKE 1 TABLET BY MOUTH EVERY DAY, Disp: 90 tablet, Rfl: 3    metoprolol tartrate (LOPRESSOR) 25 MG tablet, TAKE 1 TABLET BY MOUTH TWICE A DAY, Disp: 180 tablet, Rfl: 3    simvastatin (ZOCOR) 40 MG tablet, TAKE 1 TABLET BY MOUTH EVERY DAY AT NIGHT, Disp: 90 tablet, Rfl: 3    amLODIPine (NORVASC) 5 MG tablet, TAKE 1 TABLET BY MOUTH EVERY DAY, Disp: 90 tablet, Rfl: 3    cetirizine (ZYRTEC) 10 MG tablet, 10 mg 2 times daily , Disp: , Rfl:     nitroGLYCERIN (NITROSTAT) 0.4 MG SL tablet, Place 1 tablet under the tongue every 5 minutes as needed for Chest pain (Patient taking differently: Place 0.4 mg under the tongue every 5 minutes as needed for Chest pain ), Disp: 25 tablet, Rfl: 3    aspirin 325 MG tablet, Take 325 mg by mouth daily.   , Disp: , Rfl:     Sulfa antibiotics    Vitals:    08/17/21 1008   BP: (!) 154/84   Weight: 184 lb (83.5 kg)       Orders Only on 07/09/2020   Component Date Value Ref Range Status    Cholesterol, Total 07/09/2020 184  125-200^125^200 mg/dL Final    HDL 07/09/2020 109  >=40^>=40 mg/dL Final    Comment: National Cholesterol Education Program Adult Treatment Panel Guidelines (ATPIII):  TOTAL CHOLESTEROL:                      LESS THAN 200 mg/dL Desirable                     200-239 mg/dL Borderline                     GREATER OR EQUAL  mg/dL High  TRIGLYCERIDE:                      LESS THAN 150 mg/dL Normal                     150-199 mg/dL Borderline High                     200-499 mg/dL High                     GREATER OR EQUAL  mg/dL Very High  HDL CHOLESTEROL:                      LESS THAN 40 mg/dL Low                     GREATER THAN 60 mg/dL Desirable  LDL CHOLESTEROL:                     LESS THAN 100 mg/dL Optimal                     100-129 mg/dL Near optimal/above optimal                     130-159 mg/dL Borderline High                     160-189 mg/dL High                     GREATER OR EQUAL  mg/dL Very High  NOTE: CLINICAL DECISION MAKING, INCLUDING POSSIBLE TREATMENT, WILL DEPEND ON RISK FACTOR   ANALYSIS AND CONSULTATION WITH A PHYSICIAN.  LDL Calculated 07/09/2020 58  <=100^^<=100 mg/dL Final    Triglycerides 07/09/2020 83  <=150^^<=150 mg/dL Final       Review of Systems   Constitutional: Negative. HENT: Negative. Eyes: Negative. Respiratory: Negative. Cardiovascular: Positive for leg swelling. Gastrointestinal: Negative. Endocrine: Negative. Genitourinary: Negative. Musculoskeletal: Negative. Skin: Negative. Neurological: Negative. Psychiatric/Behavioral: Negative. All other systems reviewed and are negative. Physical Exam  Vitals reviewed. Constitutional:       Appearance: He is well-developed. HENT:      Head: Normocephalic. Eyes:      Pupils: Pupils are equal, round, and reactive to light. Neck:      Vascular: No carotid bruit. Cardiovascular:      Rate and Rhythm: Normal rate and regular rhythm. Pulses:           Carotid pulses are 2+ on the right side with bruit and 2+ on the left side. Radial pulses are 2+ on the right side and 2+ on the left side. Femoral pulses are 2+ on the right side and 2+ on the left side with bruit. Popliteal pulses are 1+ on the right side and 2+ on the left side. Dorsalis pedis pulses are 1+ on the right side and 2+ on the left side.         Posterior tibial pulses are 1+ on the right side and 2+ on the left side. Heart sounds: Normal heart sounds.       Comments:     Doppler 4/9/2021:  Rt DP: monophasic (strong)  Rt PT: monophasic  Rt AT: not checked    Lt DP: monophasic   Lt PT: biphasic  Lt AT: not checked    Graft signal: monophasic (strong)      4/9/21  Left leg graft +2    12/4/2020  Left leg graft +2    Doppler 12/4/2020:  Rt DP: monophasic  Rt PT: monophasic  Rt AT: not check    Lt DP: monophasic (strong)  Lt PT: biphasic  Lt AT: not checked    Left leg graft: biphasic      MEASUREMENTS 8/25/2020:    RIGHT ANKLE: 19.1 cm   RIGHT CALF: 33.6 cm     LEFT ANKLE: 22.4 cm   LEFT CALF: 34.8 cm     Doppler 8/25/2020:  Rt DP: biphasic  Rt PT: monophasic  Rt AT:     Lt DP: monophasic (strong)  Lt PT: biphasic (strong)  Lt AT:      MEASUREMENTS 7/24/2020:    RIGHT ANKLE: 19.8 cm   RIGHT CALF: 34.2 cm     LEFT ANKLE: 23.7 cm   LEFT CALF: 38.0 cm       Doppler 7/24/2020:  Rt DP: biphasic  Rt PT:  monophasic  Rt AT:     Lt DP: none found  Lt PT: biphasic  Lt AT: monophasic, found above crease of the foot  STRONG GRAFT SIGNAL      7/10/2020  +2 of graft at knee  MEASUREMENTS 7/10/2020:    RIGHT ANKLE: 19.2 cm  RIGHT CALF: 34.2 cm    LEFT ANKLE: 22.3 cm  LEFT CALF: 36.0 cm       Doppler 7/10/2020:  Rt DP: monophasic (strong)  Rt PT: monophasic  Rt AT:     Lt DP: biphasic  Lt PT: monophasic (very strong)  Lt AT:    Bypass graft very strong    Doppler 1/21/2020:  Rt DP:  Biphasic  Rt PT: monophasic (strong)   Rt AT:     Lt DP: monophasic (weak)  Lt PT: monophasic (very weak)  Lt AT:          Doppler 11/15/19  Rt DP:   Rt PT:   Rt AT:     Lt DP: monophasic  Lt PT: monophasic  Lt AT:      Doppler 10/25/19:  Rt DP: biphasic  Rt PT: monophasic (weak)  Rt AT:     Lt DP: monophasic  Lt PT: monophasic  Lt AT:    ~~~previous visit~~~~      Doppler signals in right DP and PT with waveforms obtained  Normal multiphasic DP doppler signal in left foot  Left PT signal also multiphasic  Swelling of revascularization to left foot and ankle and lower leg  Skin remains intact  Varicose veins at the left ankle region      Ankle Brachial Index Report 8/13/18     Right  Left  Arm   167   168   Posterior Tibial 94   WNO   Dorsalis Pedis 102  160    LISA:   0.61  0.96    Ankle Brachial Index Report 9/17/18     Right  Left  Arm   164   172   Posterior Tibial 104   158   Dorsalis Pedis 92  148    LISA:   0.60  0.92      Pulmonary:      Effort: Pulmonary effort is normal. No respiratory distress. Breath sounds: Normal breath sounds. No wheezing or rales. Abdominal:      General: There is no distension. Palpations: There is no mass. Tenderness: There is no abdominal tenderness. There is no guarding. Musculoskeletal:         General: No tenderness. Arms:       Cervical back: Normal range of motion and neck supple. Left lower leg: Swelling present. 3+ Pitting Edema present. Left foot: Swelling present. Legs:       Comments:   Blood blister on left baby toe  1.4 cm long x 0.4 cm wide   Skin:     General: Skin is warm. Neurological:      Cranial Nerves: No cranial nerve deficit. Sensory: Sensory deficit (hypersensitive from revascularization effect) present. Coordination: Coordination normal.      Gait: Gait normal.           ASSESSMENT:    Problem List Items Addressed This Visit     Status post peripheral artery angioplasty - Primary    PAD (peripheral artery disease) (HCC)          PLAN:    Return in six months for an aorta iliac scan, bilateral lower extremity arterial scan and office visit. Call if you have any new symptoms.          Flaco Alegre MA am scribing for and in the presence of Patrick Curiel MD on this date of 08/17/21    I Rudy Romero MD personally performed the services described in this documentation as scribed by the Medical Assistant Yuki Alvarez in my presence and it is both accurate and complete.         Electronically signed by India Johnson MD on 8/17/2021 at 4:36 PM

## 2021-08-17 NOTE — PATIENT INSTRUCTIONS
Return in six months for an aorta iliac scan, bilateral lower extremity arterial scan and office visit. Call if you have any new symptoms.

## 2022-02-09 NOTE — DISCHARGE INSTR - DIET

## 2022-03-18 ENCOUNTER — OFFICE VISIT (OUTPATIENT)
Dept: VASCULAR SURGERY | Age: 52
End: 2022-03-18
Payer: COMMERCIAL

## 2022-03-18 ENCOUNTER — PROCEDURE VISIT (OUTPATIENT)
Dept: SURGERY | Age: 52
End: 2022-03-18
Payer: COMMERCIAL

## 2022-03-18 VITALS — WEIGHT: 189 LBS | SYSTOLIC BLOOD PRESSURE: 164 MMHG | BODY MASS INDEX: 24.94 KG/M2 | DIASTOLIC BLOOD PRESSURE: 90 MMHG

## 2022-03-18 DIAGNOSIS — Z95.828 S/P INSERTION OF ILIAC ARTERY STENT: ICD-10-CM

## 2022-03-18 DIAGNOSIS — R09.89 RIGHT CAROTID BRUIT: ICD-10-CM

## 2022-03-18 DIAGNOSIS — E78.2 MIXED HYPERLIPIDEMIA: ICD-10-CM

## 2022-03-18 DIAGNOSIS — I70.222 ATHEROSCLEROSIS OF NATIVE ARTERIES OF EXTREMITIES WITH REST PAIN, LEFT LEG (HCC): ICD-10-CM

## 2022-03-18 DIAGNOSIS — Z98.62 STATUS POST PERIPHERAL ARTERY ANGIOPLASTY: ICD-10-CM

## 2022-03-18 DIAGNOSIS — I73.9 PAD (PERIPHERAL ARTERY DISEASE) (HCC): Primary | ICD-10-CM

## 2022-03-18 PROCEDURE — 93925 LOWER EXTREMITY STUDY: CPT | Performed by: STUDENT IN AN ORGANIZED HEALTH CARE EDUCATION/TRAINING PROGRAM

## 2022-03-18 PROCEDURE — 99214 OFFICE O/P EST MOD 30 MIN: CPT | Performed by: SURGERY

## 2022-03-18 PROCEDURE — 93978 VASCULAR STUDY: CPT | Performed by: STUDENT IN AN ORGANIZED HEALTH CARE EDUCATION/TRAINING PROGRAM

## 2022-03-18 RX ORDER — OMEPRAZOLE 20 MG/1
CAPSULE, DELAYED RELEASE ORAL
COMMUNITY
Start: 2022-03-07

## 2022-03-18 NOTE — PROGRESS NOTES
Daily Progress Note   Ally Murcia MD      3/18/2022    Chief Complaint   Patient presents with    6 Month Follow-Up     6 month aorta iliac scan, BLE arterial scan and office visit. Pt states he haqs no complaints with his legs, no changes since surgery. HISTORY OF PRESENT ILLNESS:                The patient is a 50 y.o. male who presents with a six month follow up and aorta iliac scan and arterial duplex scan of bilateral lower extremities. Mr. Barney Vaughn says he has no complaints since his last visit. He has no wounds on his feet at this time. He had a left femoral to posterior tibial bypass with reversed left arm cephalic vein 8/7/9720. He says there is really no difference since his last visit: occasionally his left foot swells, and is better in the morning, and he can still walk through the grocery, or [employer] with no problems. His scan today has no change from his last visit. Past Medical History:   Diagnosis Date    Acute MI ([facility] [address] 2010    inferior wall    Buerger's disease ([facility] [address]     CAD (coronary artery disease)     S/P IWMIi 4/10 w/ RCA FIONA X3, normal LVEF. Cath 11/10 nonobstructive LAD dz, RCA stent with moderate instent restenosis.  s/p OM fiona X2 12/10    Closed fracture of cervical spine ([facility] [address]     2000; C2-3; fell off ladder    Depression     not on meds    Erythrodermic psoriasis     GERD (gastroesophageal reflux disease)     managed by PCP    Hyperlipidemia     rec semi annual lipids with LDL goal <70    Hypertension     controlled    Ischemic ulcer of lower leg due to atherosclerotic disease ([facility] [address]     [facility]AD [facility] ([facility])     Aortogram 90% SFA followed by Dr. Kayla Gallardo [facility] 7/12/2011    PONV (postoperative nausea and vomiting)     after CABG    Wears glasses     reading       Past Surgical History:   Procedure Laterality Date    CARDIAC CATHETERIZATION      multiple    CARDIAC SURGERY      CABG--2 vessels    CORONARY ANGIOPLASTY      multiple cardiac stents    FEMORAL-TIBIAL BYPASS GRAFT Left 2020    LEFT FEMORAL TO POSTERIOR TIBIAL BYPASS WITH REVERSED LEFT ARM CEPHALIC VEIN [facility] 2018    stent  x 2; vascular dz; Left EIA angioplasty:  Left SFA/Pop atherectomy/angioplasty of native arteries    VASCULAR SURGERY Left 2011    IR FEMORAL POPLITEAL BYPASS GRAFT        Social History     Socioeconomic History    Marital status:      Spouse name: Not on file    Number of children: Not on file    Years of education: Not on file    Highest education level: Not on file   Occupational History    Not on file   Tobacco Use    Smoking status: Former Smoker     Packs/day: 0.25     Years: 25.00     Pack years: 6.25     Types: Cigarettes     Quit date: 3/5/2014     Years since quittin.0    Smokeless tobacco: Never Used   Vaping Use    Vaping Use: Every day    Substances: Always   Substance and Sexual Activity    Alcohol use: Yes     Alcohol/week: 48.0 standard drinks     Types: 48 Cans of beer per week     Comment: patient states drinks at least 8 beers/day    Drug use: Never    Sexual activity: Yes     Partners: Female   Other Topics Concern    Not on file   Social History Narrative    Not on file     Social Determinants of Health     Financial Resource Strain:     Difficulty of Paying Living Expenses: Not on file   Food Insecurity:     Worried About Running Out of Food in the Last Year: Not on file    Aimee of Food in the Last Year: Not on file   Transportation Needs:     Lack of Transportation (Medical): Not on file    Lack of Transportation (Non-Medical):  Not on file   Physical Activity:     Days of Exercise per Week: Not on file    Minutes of Exercise per Session: Not on file   Stress:     Feeling of Stress : Not on file   Social Connections:     Frequency of Communication with Friends and Family: Not on file    166/90 (!) 164/90   Weight: 189 lb (85.7 kg)        Orders Only on 07/09/2020   Component Date Value Ref Range Status    Cholesterol, Total 07/09/2020 184  125-200^125^200 mg/dL Final    HDL 07/09/2020 109  >=40^>=40 mg/dL Final    Comment: National Cholesterol Education Program Adult Treatment Panel Guidelines (ATPIII):  TOTAL CHOLESTEROL:                      LESS THAN 200 mg/dL Desirable                     200-239 mg/dL Borderline                     GREATER OR EQUAL  mg/dL High  TRIGLYCERIDE:                      LESS THAN 150 mg/dL Normal                     150-199 mg/dL Borderline High                     200-499 mg/dL High                     GREATER OR EQUAL  mg/dL Very High  HDL CHOLESTEROL:                      LESS THAN 40 mg/dL Low                     GREATER THAN 60 mg/dL Desirable  LDL CHOLESTEROL:                     LESS THAN 100 mg/dL Optimal                     100-129 mg/dL Near optimal/above optimal                     130-159 mg/dL Borderline High                     160-189 mg/dL High                     GREATER OR EQUAL  mg/dL Very High  NOTE: CLINICAL DECISION MAKING, INCLUDING POSSIBLE TREATMENT, WILL DEPEND ON RISK FACTOR   ANALYSIS AND CONSULTATION WITH A PHYSICIAN.  LDL Calculated 07/09/2020 58  <=100^^<=100 mg/dL Final    Triglycerides 07/09/2020 83  <=150^^<=150 mg/dL Final       Review of Systems    Physical Exam  Vitals reviewed. Constitutional:       Appearance: He is well-developed. HENT:      Head: Normocephalic. Eyes:      Pupils: Pupils are equal, round, and reactive to light. Neck:      Vascular: No carotid bruit. Cardiovascular:      Rate and Rhythm: Normal rate and regular rhythm. Pulses:           Carotid pulses are 2+ on the right side with bruit and 2+ on the left side. Radial pulses are 2+ on the right side and 2+ on the left side. Femoral pulses are 2+ on the right side and 2+ on the left side with bruit. Popliteal pulses are 1+ on the right side and 2+ on the left side. Dorsalis pedis pulses are 2+ on the right side and 2+ on the left side. Posterior tibial pulses are 1+ on the right side and 2+ on the left side. Heart sounds: Normal heart sounds.       Comments:   Doppler 3/18/22:  Rt DP: monophasic (strong)  Rt PT: monophasic (strong)   Rt AT:     Lt DP: monophasic (strong)  Lt PT: monophasic (strong)  Lt bypass: biphasic    Lt AT:    Doppler 4/9/2021:  Rt DP: monophasic (strong)  Rt PT: monophasic  Rt AT: not checked    Lt DP: monophasic   Lt PT: biphasic  Lt AT: not checked    Graft signal: monophasic (strong)      4/9/21  Left leg graft +2    12/4/2020  Left leg graft +2    Doppler 12/4/2020:  Rt DP: monophasic  Rt PT: monophasic  Rt AT: not check    Lt DP: monophasic (strong)  Lt PT: biphasic  Lt AT: not checked    Left leg graft: biphasic      MEASUREMENTS 8/25/2020:    RIGHT ANKLE: 19.1 cm   RIGHT CALF: 33.6 cm     LEFT ANKLE: 22.4 cm   LEFT CALF: 34.8 cm     Doppler 8/25/2020:  Rt DP: biphasic  Rt PT: monophasic  Rt AT:     Lt DP: monophasic (strong)  Lt PT: biphasic (strong)  Lt AT:      MEASUREMENTS 7/24/2020:    RIGHT ANKLE: 19.8 cm   RIGHT CALF: 34.2 cm     LEFT ANKLE: 23.7 cm   LEFT CALF: 38.0 cm       Doppler 7/24/2020:  Rt DP: biphasic  Rt PT:  monophasic  Rt AT:     Lt DP: none found  Lt PT: biphasic  Lt AT: monophasic, found above crease of the foot  STRONG GRAFT SIGNAL      7/10/2020  +2 of graft at knee  MEASUREMENTS 7/10/2020:    RIGHT ANKLE: 19.2 cm  RIGHT CALF: 34.2 cm    LEFT ANKLE: 22.3 cm  LEFT CALF: 36.0 cm       Doppler 7/10/2020:  Rt DP: monophasic (strong)  Rt PT: monophasic  Rt AT:     Lt DP: biphasic  Lt PT: monophasic (very strong)  Lt AT:    Bypass graft very strong    Doppler 1/21/2020:  Rt DP:  Biphasic  Rt PT: monophasic (strong)   Rt AT:     Lt DP: monophasic (weak)  Lt PT: monophasic (very weak)  Lt AT:          Doppler 11/15/19  Rt DP:   Rt PT:   Rt AT: Lt DP: monophasic  Lt PT: monophasic  Lt AT:      Doppler 10/25/19:  Rt DP: biphasic  Rt PT: monophasic (weak)  Rt AT:     Lt DP: monophasic  Lt PT: monophasic  Lt AT:    ~~~previous visit~~~~      Doppler signals in right DP and PT with waveforms obtained  Normal multiphasic DP doppler signal in left foot  Left PT signal also multiphasic  Swelling of revascularization to left foot and ankle and lower leg  Skin remains intact  Varicose veins at the left ankle region      Ankle Brachial Index Report 8/13/18     Right  Left  Arm   167   168   Posterior Tibial 94   WNO   Dorsalis Pedis 102  160    LISA:   0.61  0.96    Ankle Brachial Index Report 9/17/18     Right  Left  Arm   164   172   Posterior Tibial 104   158   Dorsalis Pedis 92  148    LISA:   0.60  0.92      Pulmonary:      Effort: Pulmonary effort is normal. No respiratory distress. Breath sounds: Normal breath sounds. No wheezing or rales. Abdominal:      General: There is no distension. Palpations: There is no mass. Tenderness: There is no abdominal tenderness. There is no guarding. Musculoskeletal:         General: No tenderness. Arms:       Cervical back: Normal range of motion and neck supple. Left lower leg: Swelling present. 3+ Pitting Edema present. Left foot: Swelling present. Legs:    Skin:     General: Skin is warm. Neurological:      Cranial Nerves: No cranial nerve deficit. Sensory: Sensory deficit (hypersensitive from revascularization effect) present.       Coordination: Coordination normal.      Gait: Gait normal.           ASSESSMENT:    Problem List Items Addressed This Visit        High    PAD (peripheral artery disease) (HCC) - Primary       Unprioritized    Status post peripheral artery angioplasty    Right carotid bruit    Hyperlipidemia    Atherosclerosis of native arteries of extremities with rest pain, left leg (HCC)          PLAN:  Carotid less than 50% stenosis we will check this about every year and a half  We will check his aorta iliac scan again in a year follow his multiple stenoses and right-sided stent  We have to look at that aneurysmal graft again in 6 months as well as the usual surveillance for the bypass  Return in six months for a bilateral arterial duplex scan and bilateral carotid duplex scan and office visit. Gerry Gomez MA am scribing for and in the presence of Soni Neville MD on this date of 03/18/22    I Brandon Goodrich MD personally performed the services described in this documentation as scribed by the Medical Assistant Nita Alvarez in my presence and it is both accurate and complete.         Electronically signed by Soni Neville MD on 3/18/2022 at 12:21 PM

## 2022-03-18 NOTE — PATIENT INSTRUCTIONS
Return in six months for a bilateral arterial duplex scan and bilateral carotid duplex scan and office visit.

## 2022-09-30 ENCOUNTER — PROCEDURE VISIT (OUTPATIENT)
Dept: SURGERY | Age: 52
End: 2022-09-30
Payer: COMMERCIAL

## 2022-09-30 DIAGNOSIS — I73.9 PAD (PERIPHERAL ARTERY DISEASE) (HCC): Primary | ICD-10-CM

## 2022-09-30 DIAGNOSIS — I65.23 CAROTID STENOSIS, ASYMPTOMATIC, BILATERAL: ICD-10-CM

## 2022-09-30 PROCEDURE — 93880 EXTRACRANIAL BILAT STUDY: CPT | Performed by: STUDENT IN AN ORGANIZED HEALTH CARE EDUCATION/TRAINING PROGRAM

## 2022-09-30 PROCEDURE — 93925 LOWER EXTREMITY STUDY: CPT | Performed by: STUDENT IN AN ORGANIZED HEALTH CARE EDUCATION/TRAINING PROGRAM

## 2022-10-21 ENCOUNTER — OFFICE VISIT (OUTPATIENT)
Dept: VASCULAR SURGERY | Age: 52
End: 2022-10-21
Payer: COMMERCIAL

## 2022-10-21 VITALS
DIASTOLIC BLOOD PRESSURE: 82 MMHG | SYSTOLIC BLOOD PRESSURE: 135 MMHG | BODY MASS INDEX: 23.88 KG/M2 | HEART RATE: 69 BPM | WEIGHT: 180.2 LBS | HEIGHT: 73 IN

## 2022-10-21 DIAGNOSIS — T82.398D: ICD-10-CM

## 2022-10-21 DIAGNOSIS — R09.89 RIGHT CAROTID BRUIT: ICD-10-CM

## 2022-10-21 DIAGNOSIS — I73.9 PAD (PERIPHERAL ARTERY DISEASE) (HCC): Primary | ICD-10-CM

## 2022-10-21 PROBLEM — T82.398A BYPASS GRAFT MECHANICAL COMPLICATION (HCC): Status: ACTIVE | Noted: 2022-10-21

## 2022-10-21 PROCEDURE — 99214 OFFICE O/P EST MOD 30 MIN: CPT | Performed by: SURGERY

## 2022-10-21 ASSESSMENT — ENCOUNTER SYMPTOMS
RESPIRATORY NEGATIVE: 1
GASTROINTESTINAL NEGATIVE: 1
EYES NEGATIVE: 1

## 2022-10-21 NOTE — PATIENT INSTRUCTIONS
Schedule to return in six months for an aorta iliac scan, and bilateral arterial duplex with office visit      The patient will need to fast for at least 5 hours prior to the aorta iliac ultrasound scan.  Please understand that by not doing so may result in having an inaccurate ultrasound and may cause your ultrasound to be rescheduled

## 2022-10-21 NOTE — PROGRESS NOTES
Daily Progress Note   Rossy Harden MD      10/21/2022    Chief Complaint   Patient presents with    Follow-up     PAD and Carotid Bruit         HISTORY OF PRESENT ILLNESS:                The patient is a 50 y.o. male who presents with a six month follow up for PAD and a carotid bruit. Mr. Maximiliano Mohan says he is doing okay. Nothing has changed as far as how far he can walk: he says he walks as far as he needs. He is here today with his wife who says he has been moving and walking a lot. Past Medical History:   Diagnosis Date    Acute MI ([facility] 2010    inferior wall    Buerger's disease ([facility]     CAD (coronary artery disease)     S/P IWMIi 4/10 w/ RCA FIONA X3, normal LVEF. Cath 11/10 nonobstructive LAD dz, RCA stent with moderate instent restenosis.  s/p OM fiona X2 12/10    Closed fracture of cervical spine ([facility]     2000; C2-3; fell off ladder    Depression     not on meds    Erythrodermic psoriasis     GERD (gastroesophageal reflux disease)     managed by PCP    Hyperlipidemia     rec semi annual lipids with LDL goal <70    Hypertension     controlled    Ischemic ulcer of lower leg due to atherosclerotic disease ([facility]     PAD (peripheral artery disease) ([facility])     Aortogram 90% SFA followed by Dr. Kvng Mcclellan    PAD [facility]peripheral [facility] 7/12/2011    PONV (postoperative nausea and vomiting)     after CABG    Wears glasses     reading       Past Surgical History:   Procedure Laterality Date    CARDIAC CATHETERIZATION      multiple    CARDIAC SURGERY      CABG--2 vessels    CORONARY ANGIOPLASTY      multiple cardiac stents    FEMORAL-TIBIAL BYPASS GRAFT Left 02/05/2020    LEFT FEMORAL TO POSTERIOR TIBIAL BYPASS WITH REVERSED LEFT ARM CEPHALIC VEIN performed by Ameena Martinez MD at [facility] Left 07/28/2018    stent  x 2; vascular dz; Left EIA angioplasty:  Left SFA/Pop atherectomy/angioplasty of native arteries    VASCULAR SURGERY Left 07/21/2011    ANGELITO Bauer left femoropopliteal Social History     Socioeconomic History    Marital status:      Spouse name: Not on file    Number of children: Not on file    Years of education: Not on file    Highest education level: Not on file   Occupational History    Not on file   Tobacco Use    Smoking status: Former     Packs/day: 0.25     Years: 25.00     Pack years: 6.25     Types: Cigarettes     Quit date: 3/5/2014     Years since quittin.6    Smokeless tobacco: Never   Vaping Use    Vaping Use: Every day    Substances: Always   Substance and Sexual Activity    Alcohol use:  Yes     Alcohol/week: 48.0 standard drinks     Types: 48 Cans of beer per week     Comment: patient states drinks at least 8 beers/day    Drug use: Never    Sexual activity: Yes     Partners: Female   Other Topics Concern    Not on file   Social History Narrative    Not on file     Social Determinants of Health     Financial Resource Strain: Not on file   Food Insecurity: Not on file   Transportation Needs: Not on file   Physical Activity: Not on file   Stress: Not on file   Social Connections: Not on file   Intimate Partner Violence: Not on file   Housing Stability: Not on file       Family History   Problem Relation Age of Onset    Heart Disease Mother     Diabetes Father     High Blood Pressure Father     High Blood Pressure Brother     High Blood Pressure Paternal Uncle     Heart Disease Paternal Uncle     Stroke Paternal Uncle     Diabetes Paternal Uncle          Current Outpatient Medications:     omeprazole (PRILOSEC) 20 MG delayed release capsule, TAKE 1 CAPSULE BY MOUTH EVERY DAY, Disp: , Rfl:     clopidogrel (PLAVIX) 75 MG tablet, TAKE 1 TABLET BY MOUTH EVERY DAY, Disp: 90 tablet, Rfl: 3    metoprolol tartrate (LOPRESSOR) 25 MG tablet, TAKE 1 TABLET BY MOUTH TWICE A DAY, Disp: 180 tablet, Rfl: 3    simvastatin (ZOCOR) 40 MG tablet, TAKE 1 TABLET BY MOUTH EVERY DAY AT NIGHT, Disp: 90 tablet, Rfl: 3    amLODIPine (NORVASC) 5 MG tablet, TAKE 1 TABLET BY MOUTH EVERY DAY, Disp: 90 tablet, Rfl: 3    cetirizine (ZYRTEC) 10 MG tablet, 10 mg 2 times daily , Disp: , Rfl:     nitroGLYCERIN (NITROSTAT) 0.4 MG SL tablet, Place 1 tablet under the tongue every 5 minutes as needed for Chest pain, Disp: 25 tablet, Rfl: 3    aspirin 325 MG tablet, Take 325 mg by mouth daily. , Disp: , Rfl:     Sulfa antibiotics    Vitals:    10/21/22 0937   BP: 135/82   Site: Left Upper Arm   Pulse: 69   Weight: 180 lb 3.2 oz (81.7 kg)   Height: 6' 1\" (1.854 m)       Orders Only on 05/23/2022   Component Date Value Ref Range Status    PSA 05/23/2022 2.0  0.0-4.0^0.0^4.0 ng/mL Final    Comment: Biotin doses of > 1,000 mcg per day may potentially interfere with this assay causing   falsely elevated or decreased results dependent upon the assay tested. Please contact the   laboratory at [phone] for direction of interference. Specimens should be collected at   least 24 hours, ideally 48 hours, after oral or intravenous biotin administration to   minimize biotin interference. Review of Systems   Constitutional: Negative. HENT: Negative. Eyes: Negative. Respiratory: Negative. Cardiovascular:  Positive for leg swelling. Gastrointestinal: Negative. Endocrine: Negative. Genitourinary: Negative. Musculoskeletal: Negative. Skin: Negative. Neurological: Negative. Psychiatric/Behavioral: Negative. All other systems reviewed and are negative. Physical Exam  Vitals reviewed. Constitutional:       Appearance: He is well-developed. HENT:      Head: Normocephalic. Eyes:      Pupils: Pupils are equal, round, and reactive to light. Neck:      Vascular: No carotid bruit. Cardiovascular:      Rate and Rhythm: Normal rate and regular rhythm. Pulses:           Carotid pulses are 2+ on the right side with bruit and 2+ on the left side. Radial pulses are 2+ on the right side and 2+ on the left side.         Femoral pulses are 2+ on the right side and 2+ on the left side with bruit. Popliteal pulses are 1+ on the right side and 2+ on the left side. Dorsalis pedis pulses are 2+ on the right side and 1+ on the left side. Posterior tibial pulses are 1+ on the right side and 2+ on the left side. Heart sounds: Normal heart sounds.       Comments:   Doppler 10/21/2022:  Rt DP: monophasic (strong)  Rt PT: monophasic (strong)  Rt AT:     Lt DP: biphasic  Lt PT: biphasic  Lt AT:        Doppler 3/18/22:  Rt DP: monophasic (strong)  Rt PT: monophasic (strong)   Rt AT:     Lt DP: monophasic (strong)  Lt PT: monophasic (strong)  Lt bypass: biphasic    Lt AT:    Doppler 4/9/2021:  Rt DP: monophasic (strong)  Rt PT: monophasic  Rt AT: not checked    Lt DP: monophasic   Lt PT: biphasic  Lt AT: not checked    Graft signal: monophasic (strong)      4/9/21  Left leg graft +2    12/4/2020  Left leg graft +2    Doppler 12/4/2020:  Rt DP: monophasic  Rt PT: monophasic  Rt AT: not check    Lt DP: monophasic (strong)  Lt PT: biphasic  Lt AT: not checked    Left leg graft: biphasic      MEASUREMENTS 8/25/2020:    RIGHT ANKLE: 19.1 cm   RIGHT CALF: 33.6 cm     LEFT ANKLE: 22.4 cm   LEFT CALF: 34.8 cm     Doppler 8/25/2020:  Rt DP: biphasic  Rt PT: monophasic  Rt AT:     Lt DP: monophasic (strong)  Lt PT: biphasic (strong)  Lt AT:      MEASUREMENTS 7/24/2020:    RIGHT ANKLE: 19.8 cm   RIGHT CALF: 34.2 cm     LEFT ANKLE: 23.7 cm   LEFT CALF: 38.0 cm       Doppler 7/24/2020:  Rt DP: biphasic  Rt PT:  monophasic  Rt AT:     Lt DP: none found  Lt PT: biphasic  Lt AT: monophasic, found above crease of the foot  STRONG GRAFT SIGNAL      7/10/2020  +2 of graft at knee  MEASUREMENTS 7/10/2020:    RIGHT ANKLE: 19.2 cm  RIGHT CALF: 34.2 cm    LEFT ANKLE: 22.3 cm  LEFT CALF: 36.0 cm       Doppler 7/10/2020:  Rt DP: monophasic (strong)  Rt PT: monophasic  Rt AT:     Lt DP: biphasic  Lt PT: monophasic (very strong)  Lt AT:    Bypass graft very strong    Doppler 1/21/2020:  Rt DP:  Biphasic  Rt PT: monophasic (strong)   Rt AT:     Lt DP: monophasic (weak)  Lt PT: monophasic (very weak)  Lt AT:          Doppler 11/15/19  Rt DP:   Rt PT:   Rt AT:     Lt DP: monophasic  Lt PT: monophasic  Lt AT:      Doppler 10/25/19:  Rt DP: biphasic  Rt PT: monophasic (weak)  Rt AT:     Lt DP: monophasic  Lt PT: monophasic  Lt AT:    ~~~previous visit~~~~      Doppler signals in right DP and PT with waveforms obtained  Normal multiphasic DP doppler signal in left foot  Left PT signal also multiphasic  Swelling of revascularization to left foot and ankle and lower leg  Skin remains intact  Varicose veins at the left ankle region      Ankle Brachial Index Report 8/13/18     Right  Left  Arm   167   168   Posterior Tibial 94   WNO   Dorsalis Pedis 102  160    LISA:   0.61  0.96    Ankle Brachial Index Report 9/17/18     Right  Left  Arm   164   172   Posterior Tibial 104   158   Dorsalis Pedis 92  148    LISA:   0.60  0.92      Pulmonary:      Effort: Pulmonary effort is normal. No respiratory distress. Breath sounds: Normal breath sounds. No wheezing or rales. Abdominal:      General: There is no distension. Palpations: There is no mass. Tenderness: There is no abdominal tenderness. There is no guarding. Musculoskeletal:         General: No tenderness. Arms:       Cervical back: Normal range of motion and neck supple. Left lower leg: Swelling present. 3+ Pitting Edema present. Left foot: Swelling present. Legs:    Skin:     General: Skin is warm. Neurological:      Cranial Nerves: No cranial nerve deficit. Sensory: Sensory deficit (hypersensitive from revascularization effect) present.       Coordination: Coordination normal.      Gait: Gait normal.         ASSESSMENT:    Problem List Items Addressed This Visit          High    PAD (peripheral artery disease) ([facility] - Primary       Medium    Bypass graft mechanical complication (HCC) Unprioritized    Right carotid bruit     Is a aneurysmal dilation of the cephalic vein used as a leg bypass on the left goes from 6 mm to 10 this is about what its been so no intervention planned but will follow. His carotid showed 30% stenosis on the right 23 on the left which is stable I do not think we have to repeat these for a year and a half or so  Right leg shows a 50% stenosis of the superficial femoral artery proximal to the stent with a greater than 50% stenosis in the mid stent and a greater than 50% stenosis of the proximal popliteal (popliteal stenosis is new )ABIs still 1.0. Left leg shows a widely patent bypass with a 10.8 mid dilated mid vein dilation 6.5 prior to the dilation 8.1 distal to the dilation  PLAN:    Schedule to return in six months for an aorta iliac scan, and bilateral arterial duplex with office visit      The patient will need to fast for at least 5 hours prior to the aorta iliac ultrasound scan. Please understand that by not doing so may result in having an inaccurate ultrasound and may cause your ultrasound to be rescheduled. Rene Peraza MA am scribing for and in the presence of Amie Reeves MD on this date of 10/21/22    I Gopi Castro MD personally performed the services described in this documentation as scribed by the Medical Assistant Jeanine Alvarez in my presence and it is both accurate and complete.       Electronically signed by Amie Reeves MD on 10/21/2022 at 11:39 AM

## 2023-01-05 ENCOUNTER — OFFICE VISIT (OUTPATIENT)
Dept: CARDIOLOGY CLINIC | Age: 53
End: 2023-01-05
Payer: COMMERCIAL

## 2023-01-05 VITALS
HEIGHT: 73 IN | OXYGEN SATURATION: 96 % | DIASTOLIC BLOOD PRESSURE: 92 MMHG | BODY MASS INDEX: 24.65 KG/M2 | HEART RATE: 72 BPM | WEIGHT: 186 LBS | SYSTOLIC BLOOD PRESSURE: 140 MMHG

## 2023-01-05 DIAGNOSIS — I10 ESSENTIAL HYPERTENSION: ICD-10-CM

## 2023-01-05 DIAGNOSIS — I73.9 PAD (PERIPHERAL ARTERY DISEASE) (HCC): ICD-10-CM

## 2023-01-05 DIAGNOSIS — E78.5 HYPERLIPIDEMIA LDL GOAL <70: ICD-10-CM

## 2023-01-05 DIAGNOSIS — I25.10 CORONARY ARTERY DISEASE INVOLVING NATIVE CORONARY ARTERY OF NATIVE HEART WITHOUT ANGINA PECTORIS: Primary | ICD-10-CM

## 2023-01-05 PROCEDURE — 3078F DIAST BP <80 MM HG: CPT | Performed by: INTERNAL MEDICINE

## 2023-01-05 PROCEDURE — 99214 OFFICE O/P EST MOD 30 MIN: CPT | Performed by: INTERNAL MEDICINE

## 2023-01-05 PROCEDURE — 93000 ELECTROCARDIOGRAM COMPLETE: CPT | Performed by: INTERNAL MEDICINE

## 2023-01-05 PROCEDURE — 3074F SYST BP LT 130 MM HG: CPT | Performed by: INTERNAL MEDICINE

## 2023-01-05 NOTE — PROGRESS NOTES
[address] [facility]  1970 January 5, 2023      CC: \"Stefani new. \"     HPI:  The patient is 50 y.o. male with a past medical history significant for CAD prior CABG (3/2014), hyperlipidemia, hypertension and PAD. He was previously following with my former partner Dr. Alexa Bejarano. Today, he reports feeling well from a cardiac standpoint. He follows with Dr. Brandee Velázquez regarding his peripheral disease. He does not participate in any regular exercise. He is able to walk over 100 yards without leg pain or shortness of breath. He denies chest pain with rest or exertion. His breathing has been comfortable without shortness of breath. He continues to smoke and admits to drinking 14-16 beers a day. Review of Systems:  Constitutional: No fatigue, weakness, night sweats or fever. HEENT: No new vision difficulties or ringing in the ears. Respiratory: No new SOB, PND, orthopnea or cough. Cardiovascular: See HPI   GI: No n/v, diarrhea, constipation, abdominal pain or changes in bowel habits. No melena, no hematochezia  : No urinary frequency, urgency, incontinence, hematuria or dysuria. Skin: No cyanosis or skin lesions. Musculoskeletal: No new muscle or joint pain. Neurological: No syncope or TIA-like symptoms. Psychiatric: No anxiety, insomnia or depression     Past Medical History:   Diagnosis Date    Acute MI ([facility] 2010    inferior wall    Buerger's disease ([facility] [address]     CAD (coronary artery disease)     S/P IWMIi 4/10 w/ RCA FIONA X3, normal LVEF. Cath 11/10 nonobstructive LAD dz, RCA stent with moderate instent restenosis.  s/p OM fiona X2 12/10    Closed fracture of cervical spine ([facility] [address]     2000; C2-3; fell off ladder    Depression     not on meds    Erythrodermic psoriasis     GERD (gastroesophageal reflux disease)     managed by PCP    Hyperlipidemia     rec semi annual lipids with LDL goal <70    Hypertension     controlled    Ischemic ulcer of lower leg due to atherosclerotic disease ([facility] [address]     PAD (peripheral artery disease) ([facility])     Aortogram 90% SFA followed by Dr. Olena Hendricks    PAD (peripheral artery disease) ([facility] 2011    PONV (postoperative nausea and vomiting)     after CABG    Wears glasses     reading     Past Surgical History:   Procedure Laterality Date    CARDIAC CATHETERIZATION      multiple    CARDIAC SURGERY      CABG--2 vessels    CORONARY ANGIOPLASTY      multiple cardiac stents    FEMORAL-TIBIAL BYPASS GRAFT Left 2020    LEFT FEMORAL TO POSTERIOR TIBIAL BYPASS WITH REVERSED LEFT ARM CEPHALIC VEIN performed by Beronica Oneill MD at [facility] Left 2018    stent  x 2; vascular dz; Left EIA angioplasty:  Left SFA/Pop atherectomy/angioplasty of native arteries    VASCULAR SURGERY Left 2011    EJ Zyat left femoropopliteal     Family History   Problem Relation Age of Onset    Heart Disease Mother     Diabetes Father     High Blood Pressure Father     High Blood Pressure Brother     High Blood Pressure Paternal Uncle     Heart Disease Paternal Uncle     Stroke Paternal Uncle     Diabetes Paternal Uncle      Social History     Tobacco Use    Smoking status: Former     Packs/day: 0.25     Years: 25.00     Pack years: 6.25     Types: Cigarettes     Quit date: 3/5/2014     Years since quittin.8    Smokeless tobacco: Never   Vaping Use    Vaping Use: Every day    Substances: Always   Substance Use Topics    Alcohol use:  Yes     Alcohol/week: 48.0 standard drinks     Types: 48 Cans of beer per week     Comment: patient states drinks at least 8 beers/day    Drug use: Never       Allergies   Allergen Reactions    Sulfa Antibiotics      Current Outpatient Medications   Medication Sig Dispense Refill    omeprazole (PRILOSEC) 20 MG delayed release capsule daily as needed      clopidogrel (PLAVIX) 75 MG tablet TAKE 1 TABLET BY MOUTH EVERY DAY 90 tablet 3    metoprolol tartrate (LOPRESSOR) 25 MG tablet TAKE 1 TABLET BY MOUTH TWICE A DAY 180 tablet 3    simvastatin (ZOCOR) 40 MG tablet TAKE 1 TABLET BY MOUTH EVERY DAY AT NIGHT 90 tablet 3    amLODIPine (NORVASC) 5 MG tablet TAKE 1 TABLET BY MOUTH EVERY DAY 90 tablet 3    cetirizine (ZYRTEC) 10 MG tablet 10 mg 2 times daily       nitroGLYCERIN (NITROSTAT) 0.4 MG SL tablet Place 1 tablet under the tongue every 5 minutes as needed for Chest pain 25 tablet 3    aspirin 325 MG tablet Take 325 mg by mouth daily. No current facility-administered medications for this visit. Physical Exam:   BP (!) 140/92   Pulse 72   Ht 6' 1\" (1.854 m)   Wt 186 lb (84.4 kg)   SpO2 96%   BMI 24.54 kg/m²   No intake or output data in the 24 hours ending 01/05/23 1605  Wt Readings from Last 2 Encounters:   01/05/23 186 lb (84.4 kg)   10/21/22 180 lb 3.2 oz (81.7 kg)     Constitutional: He is oriented to person, place, and time. He appears well-developed and well-nourished. In no acute distress. Head: Normocephalic and atraumatic. Neck: Neck supple. No JVD present. Carotid bruit is not present. No mass and no thyromegaly present. No lymphadenopathy present. Cardiovascular: Normal rate, regular rhythm, normal heart sounds and intact distal pulses. Exam reveals no gallop and no friction rub. No murmur heard. Pulmonary/Chest: Effort normal and breath sounds normal. No respiratory distress. He has no wheezes, rhonchi or rales. Abdominal: Soft, non-tender. Bowel sounds and aorta are normal. He exhibits no organomegaly, mass or bruit. Extremities: No edema, cyanosis, or clubbing. Pulses are 2+ radial/carotid/dorsalis pedis and posterior tibial bilaterally. Neurological: He is alert and oriented to person, place, and time. He has normal reflexes. No cranial nerve deficit. Coordination normal.   Skin: Skin is warm and dry. There is no rash or diaphoresis. Psychiatric: He has a normal mood and affect. His speech is normal and behavior is normal.     EKG Interpretation: 1/5/23 Normal sinus rhythm. Stress test 12/11/19  Technically a satisfactory study  Normal pharmacological stress portion of the study. No evidence of Ischemia by Myocardial Perfusion Imaging. Gated Study shows normal LV size and Systolic function; EF is 70 %. Carotids 9/30/22   Right Impression  16-49% stenosis of the right ICA based on velocity criteria and B-Mode  imaging. KELSI stenosis measures 32% by image. Right vertebral artery demonstrates antegrade flow. Left Impression 16-49% stenosis of the left ICA based on velocity criteria and B-Mode imaging. LICA stenosis measures 23% by image. Left vertebral artery demonstrates antegrade flow. Arterial studies 9/30/22   Right LISA within normal limits. Moderate stenosis appreciated in the right superficial femoral and popliteal arteries. Left LISA is falsely elevated. Patent left femoral to posterior tibial artery  bypass graft. Abnormally low velocities in the mid graft may be related to  diameter of the graft as opposed to a criteria suggesting threatened graft. Lab Review:   Lab Results   Component Value Date/Time    TRIG 56 05/23/2022 08:05 AM     05/23/2022 08:05 AM    LDLCALC 91 05/23/2022 08:05 AM    LABVLDL 10 03/05/2014 04:00 PM     Lab Results   Component Value Date/Time     05/23/2022 08:05 AM    K 4.2 05/23/2022 08:05 AM    K 4.3 02/05/2020 06:25 AM    BUN 6 05/23/2022 08:05 AM    CREATININE 0.76 05/23/2022 08:05 AM     No results for input(s): WBC, HGB, HCT, PLT in the last 72 hours. Assessment:  1. CAD of native coronary arteries without angina  2. Coronary artery bypass grafting x3   3. Hyperlipidemia with LDL goal <70 mg/dL   4. Essential hypertension   5. PAD  6. Tobacco/alcohol abuse     Plan:  I think that Mr. Doug De Leon  is entirely stable from a cardiovascular standpoint. I see no need to make any changes currently in his medical regimen or pursue further testing.  He is not endorsing any symptoms representing angina and his most recent lipid profile was favorable. I have encouraged him to increase his aerobic activity as tolerated and adhere to a heart healthy diet. I reviewed all of his prior cardiac testing for this initial visit. I encouraged smoking cessation and to limit alcohol intake. I will see him in the office for follow up in 6 months     This note was scribed in the presence of Dr Kristi Geronimo MD by Karen Zazueta, ARIE. Physician Attestation:  The scribes documentation has been prepared under my direction and personally reviewed by me in its entirety. I, Dr. Kristi Geronimo personally performed the services described in this documentation as scribed by my RN in my presence, and I confirm that the note above accurately reflects all work, treatment, procedures, and medical decision making performed by me.

## 2023-01-07 LAB
ALBUMIN SERPL-MCNC: 4.1 GM/DL
ALP BLD-CCNC: 94 UNIT/L
ALT SERPL-CCNC: 40 UNIT/L
ANION GAP SERPL CALCULATED.3IONS-SCNC: 7 MMOL/L
AST SERPL-CCNC: 72 UNIT/L
BASOPHILS ABSOLUTE: 0.1 X10(3)/UL
BASOPHILS RELATIVE PERCENT: 1.2 %
BILIRUB SERPL-MCNC: 0.6 MG/DL
BUN BLDV-MCNC: 6 MG/DL
CALCIUM SERPL-MCNC: 9 MG/DL
CALCIUM SERPL-MCNC: 9.1 MG/DL
CHLORIDE BLD-SCNC: 98 MMOL/L
CHOLESTEROL, TOTAL: 178 MG/DL
CO2: 26 MMOL/L
CORRECTED WBC: 7.3 X10(3)/UL
CREAT SERPL-MCNC: 0.82 MG/DL
EGFR (CKD-EPI): 107
EOSINOPHILS ABSOLUTE COUNT: 0.1 X10(3)/UL
EOSINOPHILS RELATIVE PERCENT: 1.5 %
ERYTHROCYTE [DISTWIDTH] IN BLOOD BY AUTOMATED COUNT: 12.4 %
GLUCOSE: 94 MG/DL
HCT VFR BLD CALC: 39.2 %
HDLC SERPL-MCNC: 84 MG/DL
HEMOGLOBIN: 13.9 GM/DL
IMMATURE GRANS (ABS): 0.08 X10(3)/UL
IMMATURE GRANULOCYTES %: 1.1 %
LDL CHOLESTEROL CALCULATED: 84 MG/DL
LYMPHOCYTES ABSOLUTE: 1.3 X10(3)/UL
LYMPHOCYTES RELATIVE PERCENT: 17.5 %
MCH RBC QN AUTO: 34 PG
MCHC RBC AUTO-ENTMCNC: 36 GM/DL
MCV RBC AUTO: 96 FL
MONOCYTES ABSOLUTE: 1 X10(3)/UL
MONOCYTES RELATIVE PERCENT: 13.6 %
NEUTROPHILS ABSOLUTE: 4.8 X10(3)/UL
NEUTROPHILS RELATIVE PERCENT: 65.1 %
NRBC ABSOLUTE: 0 X10(3)/UL
NRBC AUTO PCT: 0 /100 WBCS
PLATELET # BLD: 126 X10(3)/UL
PMV BLD AUTO: 8.1 FL
POTASSIUM SERPL-SCNC: 4.2 MMOL/L
PROTEIN TOTAL: 7.6 GM/DL
RBC # BLD: 4.07 X10(6)/UL
SODIUM BLD-SCNC: 131 MMOL/L
TRIGL SERPL-MCNC: 55 MG/DL
TSH SERPL DL<=0.05 MIU/L-ACNC: 2.54 UIU/ML
WBC: 7.3 X10(3)/UL

## 2023-04-28 ENCOUNTER — OFFICE VISIT (OUTPATIENT)
Dept: VASCULAR SURGERY | Age: 53
End: 2023-04-28

## 2023-04-28 ENCOUNTER — PROCEDURE VISIT (OUTPATIENT)
Dept: SURGERY | Age: 53
End: 2023-04-28

## 2023-04-28 VITALS — WEIGHT: 188 LBS | DIASTOLIC BLOOD PRESSURE: 90 MMHG | SYSTOLIC BLOOD PRESSURE: 148 MMHG | BODY MASS INDEX: 24.8 KG/M2

## 2023-04-28 DIAGNOSIS — I10 PRIMARY HYPERTENSION: ICD-10-CM

## 2023-04-28 DIAGNOSIS — Z98.62 STATUS POST PERIPHERAL ARTERY ANGIOPLASTY: ICD-10-CM

## 2023-04-28 DIAGNOSIS — R09.89 RIGHT CAROTID BRUIT: ICD-10-CM

## 2023-04-28 DIAGNOSIS — I73.9 PAD (PERIPHERAL ARTERY DISEASE) (HCC): Primary | ICD-10-CM

## 2023-04-28 DIAGNOSIS — T82.398D: ICD-10-CM

## 2023-04-28 DIAGNOSIS — I25.10 CORONARY ARTERY DISEASE INVOLVING NATIVE CORONARY ARTERY OF NATIVE HEART WITHOUT ANGINA PECTORIS: ICD-10-CM

## 2023-04-28 DIAGNOSIS — Z95.828 S/P INSERTION OF ILIAC ARTERY STENT: ICD-10-CM

## 2023-04-28 ASSESSMENT — ENCOUNTER SYMPTOMS
EYES NEGATIVE: 1
GASTROINTESTINAL NEGATIVE: 1
RESPIRATORY NEGATIVE: 1

## 2023-04-28 NOTE — PROGRESS NOTES
Daily Progress Note   Albert Ortega MD      4/28/2023    Chief Complaint   Patient presents with    6 Month Follow-Up     6 month aorta iliac scan, arterial duplex and office visit. Pt has no complaints. HISTORY OF PRESENT ILLNESS:                The patient is a 48 y.o. male who presents with a six month follow up for aorta iliac scan and office visit. Mr. Mary Joe says he is the same as he was at his last visit. 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 He says he does some heavier lifting and walking, but finds he becomes short of breath. He says he is consistently walking. His arterial duplex scan and aorta iliac scan today showed no changes from his last scan done October, 2022. He did not need his carotid scans done this visit. Past Medical History:   Diagnosis Date    Acute MI ([facility] 2010    inferior wall    Buerger's disease ([facility]     CAD (coronary artery disease)     S/P IWMIi 4/10 w/ RCA FIONA X3, normal LVEF. Cath 11/10 nonobstructive LAD dz, RCA stent with moderate instent restenosis.  s/p OM fiona X2 12/10    Closed fracture of cervical spine ([facility]     2000; C2-3; fell off ladder    Depression     not on meds    Erythrodermic psoriasis     GERD (gastroesophageal reflux disease)     managed by PCP    Hyperlipidemia     rec semi annual lipids with LDL goal <70    Hypertension     controlled    Ischemic ulcer of lower leg due to atherosclerotic disease ([address]     PAD (peripheral artery disease) ([facility])     Aortogram 90% SFA followed by Dr. Joshua Barbosa    PAD (peripheral artery disease) ([facility] [address] 7/12/2011    PONV (postoperative nausea and vomiting)     after CABG    Wears glasses     reading       Past Surgical History:   Procedure Laterality Date    CARDIAC CATHETERIZATION      multiple    CARDIAC SURGERY      CABG--2 vessels    CORONARY ANGIOPLASTY      multiple cardiac stents    FEMORAL-TIBIAL BYPASS GRAFT Left 02/05/2020

## 2023-04-28 NOTE — PATIENT INSTRUCTIONS
Schedule to return in six months for an aorta iliac scan and a bilateral lower extremity ADS and office visit.

## 2023-07-27 ENCOUNTER — OFFICE VISIT (OUTPATIENT)
Dept: CARDIOLOGY CLINIC | Age: 53
End: 2023-07-27
Payer: COMMERCIAL

## 2023-07-27 VITALS
SYSTOLIC BLOOD PRESSURE: 152 MMHG | WEIGHT: 183 LBS | HEIGHT: 72 IN | OXYGEN SATURATION: 97 % | DIASTOLIC BLOOD PRESSURE: 84 MMHG | BODY MASS INDEX: 24.79 KG/M2 | HEART RATE: 79 BPM

## 2023-07-27 DIAGNOSIS — I10 ESSENTIAL HYPERTENSION: ICD-10-CM

## 2023-07-27 DIAGNOSIS — F10.10 ALCOHOL ABUSE: ICD-10-CM

## 2023-07-27 DIAGNOSIS — I25.10 CORONARY ARTERY DISEASE INVOLVING NATIVE CORONARY ARTERY OF NATIVE HEART WITHOUT ANGINA PECTORIS: Primary | ICD-10-CM

## 2023-07-27 DIAGNOSIS — I73.9 PAD (PERIPHERAL ARTERY DISEASE) (HCC): ICD-10-CM

## 2023-07-27 DIAGNOSIS — Z72.0 TOBACCO ABUSE: ICD-10-CM

## 2023-07-27 DIAGNOSIS — E78.5 HYPERLIPIDEMIA LDL GOAL <70: ICD-10-CM

## 2023-07-27 PROCEDURE — 3078F DIAST BP <80 MM HG: CPT | Performed by: INTERNAL MEDICINE

## 2023-07-27 PROCEDURE — 99214 OFFICE O/P EST MOD 30 MIN: CPT | Performed by: INTERNAL MEDICINE

## 2023-07-27 PROCEDURE — 3074F SYST BP LT 130 MM HG: CPT | Performed by: INTERNAL MEDICINE

## 2023-07-27 RX ORDER — NITROGLYCERIN 0.4 MG/1
0.4 TABLET SUBLINGUAL EVERY 5 MIN PRN
Qty: 25 TABLET | Refills: 3 | Status: SHIPPED | OUTPATIENT
Start: 2023-07-27

## 2023-07-27 RX ORDER — ROSUVASTATIN CALCIUM 40 MG/1
40 TABLET, COATED ORAL DAILY
Qty: 90 TABLET | Refills: 3 | Status: SHIPPED | OUTPATIENT
Start: 2023-07-27

## 2023-10-28 LAB
CHOLESTEROL, TOTAL: 159 MG/DL
HDLC SERPL-MCNC: 80 MG/DL
LDL CHOLESTEROL CALCULATED: 68 MG/DL
NON-HDL CHOLESTEROL: 79 MG/DL
TRIGL SERPL-MCNC: 54 MG/DL

## 2023-11-02 ENCOUNTER — TELEPHONE (OUTPATIENT)
Dept: CARDIOLOGY CLINIC | Age: 53
End: 2023-11-02

## 2023-11-02 NOTE — TELEPHONE ENCOUNTER
Please call patient. Thanks. Office visit 7/27/23 with Dr. Madai Norton. His last LDL was 84, so I will switch him to Crestor 40 mg daily and repeat a fasting lipid profile in 3 months. Fax received and reviewed per Dr. Madai Norton. LDLc down to 68. No new recs. Continue same meds and keep f/u as planned.

## 2023-11-03 NOTE — TELEPHONE ENCOUNTER
Called spoke with patient and patient's wife Bina Chen.  Is boyd to take Crestor 40 mg daily and repeat fasting labs in 3 months

## 2023-11-07 ENCOUNTER — PROCEDURE VISIT (OUTPATIENT)
Dept: SURGERY | Age: 53
End: 2023-11-07
Payer: COMMERCIAL

## 2023-11-07 ENCOUNTER — OFFICE VISIT (OUTPATIENT)
Dept: VASCULAR SURGERY | Age: 53
End: 2023-11-07
Payer: COMMERCIAL

## 2023-11-07 VITALS — DIASTOLIC BLOOD PRESSURE: 80 MMHG | WEIGHT: 185 LBS | SYSTOLIC BLOOD PRESSURE: 142 MMHG | BODY MASS INDEX: 25.09 KG/M2

## 2023-11-07 DIAGNOSIS — Z72.0 NICOTINE ABUSE: ICD-10-CM

## 2023-11-07 DIAGNOSIS — Z95.828 S/P INSERTION OF ILIAC ARTERY STENT: ICD-10-CM

## 2023-11-07 DIAGNOSIS — I10 PRIMARY HYPERTENSION: ICD-10-CM

## 2023-11-07 DIAGNOSIS — I73.9 PAD (PERIPHERAL ARTERY DISEASE) (HCC): Primary | ICD-10-CM

## 2023-11-07 DIAGNOSIS — I25.10 CORONARY ARTERY DISEASE INVOLVING NATIVE CORONARY ARTERY OF NATIVE HEART WITHOUT ANGINA PECTORIS: ICD-10-CM

## 2023-11-07 DIAGNOSIS — Z98.890 STATUS POST FEMOROTIBIAL BYPASS: ICD-10-CM

## 2023-11-07 PROCEDURE — 3077F SYST BP >= 140 MM HG: CPT | Performed by: SURGERY

## 2023-11-07 PROCEDURE — 3079F DIAST BP 80-89 MM HG: CPT | Performed by: SURGERY

## 2023-11-07 PROCEDURE — 93978 VASCULAR STUDY: CPT | Performed by: SURGERY

## 2023-11-07 PROCEDURE — 93925 LOWER EXTREMITY STUDY: CPT | Performed by: SURGERY

## 2023-11-07 PROCEDURE — 99214 OFFICE O/P EST MOD 30 MIN: CPT | Performed by: SURGERY

## 2023-11-07 ASSESSMENT — ENCOUNTER SYMPTOMS
RESPIRATORY NEGATIVE: 1
EYES NEGATIVE: 1
COLOR CHANGE: 1
GASTROINTESTINAL NEGATIVE: 1

## 2023-11-07 NOTE — PROGRESS NOTES
ANKLE: 19.2 cm  RIGHT CALF: 34.2 cm    LEFT ANKLE: 22.3 cm  LEFT CALF: 36.0 cm       Doppler 7/10/2020:  Rt DP: monophasic (strong)  Rt PT: monophasic  Rt AT:     Lt DP: biphasic  Lt PT: monophasic (very strong)  Lt AT:    Bypass graft very strong    Doppler 1/21/2020:  Rt DP:  Biphasic  Rt PT: monophasic (strong)   Rt AT:     Lt DP: monophasic (weak)  Lt PT: monophasic (very weak)  Lt AT:          Doppler 11/15/19  Rt DP:   Rt PT:   Rt AT:     Lt DP: monophasic  Lt PT: monophasic  Lt AT:      Doppler 10/25/19:  Rt DP: biphasic  Rt PT: monophasic (weak)  Rt AT:     Lt DP: monophasic  Lt PT: monophasic  Lt AT:    ~~~previous visit~~~~      Doppler signals in right DP and PT with waveforms obtained  Normal multiphasic DP doppler signal in left foot  Left PT signal also multiphasic  Swelling of revascularization to left foot and ankle and lower leg  Skin remains intact  Varicose veins at the left ankle region      Ankle Brachial Index Report 8/13/18     Right  Left  Arm   167   168   Posterior Tibial 94   WNO   Dorsalis Pedis 102  160    LISA:   0.61  0.96    Ankle Brachial Index Report 9/17/18     Right  Left  Arm   164   172   Posterior Tibial 104   158   Dorsalis Pedis 92  148    LISA:   0.60  0.92      Pulmonary:      Effort: Pulmonary effort is normal. No respiratory distress. Breath sounds: Normal breath sounds. No wheezing or rales. Chest:       Abdominal:      General: Bowel sounds are normal. There is no distension. Palpations: There is no mass. Tenderness: There is no abdominal tenderness. There is no guarding. Musculoskeletal:         General: No tenderness. Normal range of motion. Arms:       Cervical back: Normal range of motion and neck supple. Left lower leg: Swelling present. 2+ Pitting Edema present. Left foot: Swelling present. Legs:    Skin:     General: Skin is warm.           Neurological:      Mental Status: He is alert and oriented to person, place, and

## 2023-12-21 NOTE — PATIENT INSTRUCTIONS
Outreach call to patient to support a smooth transition of care from recent admission.  Spoke with patient, reviewed discharge medications, discharge instructions, assessed social needs, and provided education on importance of follow-up appointment with provider.      Medications  Medications reviewed with patient/caregiver?: No (12/21/2023  9:58 AM)  Is the patient having any side effects they believe may be caused by any medication additions or changes?: No (12/21/2023  9:58 AM)  Does the patient have all medications ordered at discharge?: No (12/21/2023  9:58 AM)  What is keeping the patient from filling the prescriptions?: -- (She has not picked up medication yet) (12/21/2023  9:58 AM)  Is the patient taking all medications as directed (includes completed medication regime)?: No (12/21/2023  9:58 AM)  What is preventing the patient from taking all medications as directed?: Other (Comment) (Has not picked up yet) (12/21/2023  9:58 AM)    Appointments  Does the patient have a primary care provider?: Yes (12/21/2023  9:58 AM)  Care Management Interventions: Verified appointment date/time/provider (12/21/2023  9:58 AM)  Has the patient kept scheduled appointments due by today?: Yes (12/21/2023  9:58 AM)  Care Management Interventions: Advised patient to keep appointment (12/21/2023  9:58 AM)  Follow Up Tasks: Appointments (12/21/2023  9:58 AM)    Self Management  Follow Up Tasks: Psychosocial (Non urgent) (12/21/2023  9:58 AM)    Patient Teaching  What is the patient's perception of their health status since discharge?: Improving (12/21/2023  9:58 AM)    Wrap Up  Wrap Up Additional Comments: Patient requests assistance finding an outpatient behavioral health provider. She was agreeable to me [facility]. Following our conversation I called the scheduling line at [facility] and made an appointment for patient on 1/23 @ 1pm. The appt is virtual. Patient will receive a confirmation via text.  (12/21/2023  9:58 AM)    I tried to call patient back to confirm the appt information but she did not answer. I was unable to leave a message due to her mailbox being full.    TUCKER Franklin   III  [facility]  Office Phone: [phone]  [email or web address]        you have questions about a medical condition or this instruction, always ask your healthcare professional. [place] [address] any warranty or liability for your use of this information.      START PLAVIX 75MG DAILY  START NORVASC 5MG DAILY

## 2024-01-24 NOTE — PROGRESS NOTES
[facility]    Emmanuel Ledesma  1970 February 1, 2024      CC: \"Feeling well nothing changed\"    HPI:  The patient is 54 y.o. male with a past medical history significant for CAD prior CABG (3/2014), hyperlipidemia, hypertension and PAD. He was previously following with my former partner Dr. Napier. He does follow with Dr Bess for PAD.     Today he presents for follow up and states that overall he is feeling well. He denies any new sounding cardiac complaints.  He states he is still vaping but cut back on the dosage.  He does state moves slower than he used to.  He also reports possible  neuropathy in left leg.  His left lower leg is discolored.  His  He denies any chest pains or worsening shortness of breath. He reports medication compliance and is tolerating. He continues with significant alcohol consumption and tobacco abuse. He denies any abnormal bleeding or bruising. He denies exertional chest pain/pressure, dyspnea at rest, worsening LOYD, PND, orthopnea, palpitations, lightheadedness, weight changes, changes in LE edema, and syncope.    Review of Systems:  Constitutional: No fatigue, weakness, night sweats or fever.   HEENT: No new vision difficulties or ringing in the ears.  Respiratory: No new SOB, PND, orthopnea or cough.   Cardiovascular: See HPI   GI: No n/v, diarrhea, constipation, abdominal pain or changes in bowel habits.  No melena, no hematochezia  : No urinary frequency, urgency, incontinence, hematuria or dysuria.  Skin: No cyanosis or skin lesions.  Musculoskeletal: No new muscle or joint pain.  Neurological: No syncope or TIA-like symptoms.  Psychiatric: No anxiety, insomnia or depression     Past Medical History:   Diagnosis Date    Acute MI ([facility]) 2010    inferior wall    Buerger's disease ([facility])     CAD (coronary artery disease)     S/P IWMIi 4/10 w/ RCA SISSY X3, normal LVEF. Cath 11/10 nonobstructive LAD dz, RCA stent with moderate instent restenosis.

## 2024-02-01 ENCOUNTER — OFFICE VISIT (OUTPATIENT)
Dept: CARDIOLOGY CLINIC | Age: 54
End: 2024-02-01
Payer: COMMERCIAL

## 2024-02-01 VITALS
BODY MASS INDEX: 26.28 KG/M2 | WEIGHT: 194 LBS | HEART RATE: 77 BPM | HEIGHT: 72 IN | DIASTOLIC BLOOD PRESSURE: 104 MMHG | SYSTOLIC BLOOD PRESSURE: 150 MMHG | OXYGEN SATURATION: 95 %

## 2024-02-01 DIAGNOSIS — Z95.1 HISTORY OF THREE VESSEL CORONARY ARTERY BYPASS: ICD-10-CM

## 2024-02-01 DIAGNOSIS — E78.5 HYPERLIPIDEMIA LDL GOAL <70: ICD-10-CM

## 2024-02-01 DIAGNOSIS — I25.10 CORONARY ARTERY DISEASE INVOLVING NATIVE CORONARY ARTERY OF NATIVE HEART WITHOUT ANGINA PECTORIS: Primary | ICD-10-CM

## 2024-02-01 DIAGNOSIS — I73.9 PAD (PERIPHERAL ARTERY DISEASE) (HCC): ICD-10-CM

## 2024-02-01 PROCEDURE — 3080F DIAST BP >= 90 MM HG: CPT | Performed by: INTERNAL MEDICINE

## 2024-02-01 PROCEDURE — 99214 OFFICE O/P EST MOD 30 MIN: CPT | Performed by: INTERNAL MEDICINE

## 2024-02-01 PROCEDURE — 93000 ELECTROCARDIOGRAM COMPLETE: CPT | Performed by: INTERNAL MEDICINE

## 2024-02-01 PROCEDURE — 3077F SYST BP >= 140 MM HG: CPT | Performed by: INTERNAL MEDICINE

## 2024-02-01 RX ORDER — FUROSEMIDE 20 MG/1
TABLET ORAL
Qty: 90 TABLET | Refills: 3 | Status: SHIPPED | OUTPATIENT
Start: 2024-02-01

## 2024-02-11 NOTE — PATIENT INSTRUCTIONS
Schedule to return in six months for aorta iliac scan and a bilateral lower extremity arterial duplex scan and office visit.
No

## 2024-02-15 ENCOUNTER — TELEPHONE (OUTPATIENT)
Dept: CARDIOLOGY CLINIC | Age: 54
End: 2024-02-15

## 2024-02-15 NOTE — TELEPHONE ENCOUNTER
Emmanuel called in this afternoon with an update, he states the lasix is helping the swelling in his left leg.      He can be reached at [phone].

## 2024-02-15 NOTE — TELEPHONE ENCOUNTER
Emmanuel called into the office stating he missed a phone call while on the phone with a nurse already. Informed of msg and he states he was aware. Emmanuel would like to get his blood work done at [facility] and asked if we can fax over order.     Called  lab to obtain fax and faxed order over. Emmanuel states he lives about 4 miles from there and will go get that done on Sat.

## 2024-02-15 NOTE — TELEPHONE ENCOUNTER
Great. Continue Lasix 20 mg daily as prescribed, but please encourage patient to get lab work done to check kidney function. Thank you.

## 2024-02-15 NOTE — TELEPHONE ENCOUNTER
Called patient with message from Jessica SARGENT.  Patient verbalized and confirmed understanding.

## 2024-02-24 LAB
ANION GAP SERPL CALCULATED.3IONS-SCNC: 11 MMOL/L (ref 5–15)
ANION GAP SERPL CALCULATED.3IONS-SCNC: 11 MMOL/L (ref 5–15)
BUN BLDV-MCNC: 8 MG/DL (ref 9–20)
BUN BLDV-MCNC: 8 MG/DL (ref 9–20)
CALCIUM SERPL-MCNC: 9.8 MG/DL (ref 8.6–10.3)
CALCIUM SERPL-MCNC: 9.8 MG/DL (ref 8.6–10.3)
CHLORIDE BLD-SCNC: 93 MMOL/L (ref 98–107)
CHLORIDE BLD-SCNC: 93 MMOL/L (ref 98–107)
CO2: 30.2 MMOL/L (ref 22–30)
CO2: 30.2 MMOL/L (ref 22–30)
CREAT SERPL-MCNC: 0.99 MG/DL (ref 0.66–1.25)
CREAT SERPL-MCNC: 0.99 MG/DL (ref 0.66–1.25)
EGFR (CKD-EPI): 92
EGFR (CKD-EPI): 92
GLUCOSE BLD-MCNC: 121 MG/DL (ref 70–99)
GLUCOSE BLD-MCNC: 121 MG/DL (ref 70–99)
POTASSIUM SERPL-SCNC: 3.4 MMOL/L (ref 3.5–5.1)
POTASSIUM SERPL-SCNC: 3.4 MMOL/L (ref 3.5–5.1)
SODIUM BLD-SCNC: 134 MMOL/L (ref 137–145)
SODIUM BLD-SCNC: 134 MMOL/L (ref 137–145)

## 2024-05-09 ENCOUNTER — OFFICE VISIT (OUTPATIENT)
Dept: CARDIOLOGY CLINIC | Age: 54
End: 2024-05-09
Payer: COMMERCIAL

## 2024-05-09 VITALS
SYSTOLIC BLOOD PRESSURE: 120 MMHG | BODY MASS INDEX: 26.03 KG/M2 | OXYGEN SATURATION: 97 % | HEIGHT: 72 IN | WEIGHT: 192.2 LBS | HEART RATE: 71 BPM | DIASTOLIC BLOOD PRESSURE: 98 MMHG

## 2024-05-09 DIAGNOSIS — E78.5 HYPERLIPIDEMIA LDL GOAL <70: ICD-10-CM

## 2024-05-09 DIAGNOSIS — Z95.1 HISTORY OF THREE VESSEL CORONARY ARTERY BYPASS: ICD-10-CM

## 2024-05-09 DIAGNOSIS — I73.9 PAD (PERIPHERAL ARTERY DISEASE) (HCC): ICD-10-CM

## 2024-05-09 DIAGNOSIS — I25.10 CORONARY ARTERY DISEASE INVOLVING NATIVE CORONARY ARTERY OF NATIVE HEART WITHOUT ANGINA PECTORIS: Primary | ICD-10-CM

## 2024-05-09 DIAGNOSIS — Z72.0 TOBACCO ABUSE: ICD-10-CM

## 2024-05-09 DIAGNOSIS — I10 ESSENTIAL HYPERTENSION: ICD-10-CM

## 2024-05-09 DIAGNOSIS — I35.0 NONRHEUMATIC AORTIC VALVE STENOSIS: ICD-10-CM

## 2024-05-09 PROCEDURE — 3074F SYST BP LT 130 MM HG: CPT | Performed by: INTERNAL MEDICINE

## 2024-05-09 PROCEDURE — 99214 OFFICE O/P EST MOD 30 MIN: CPT | Performed by: INTERNAL MEDICINE

## 2024-05-09 PROCEDURE — 3080F DIAST BP >= 90 MM HG: CPT | Performed by: INTERNAL MEDICINE

## 2024-05-09 PROCEDURE — 93000 ELECTROCARDIOGRAM COMPLETE: CPT | Performed by: INTERNAL MEDICINE

## 2024-05-09 NOTE — PROGRESS NOTES
[facility]    Emmanuel Ledesma  1970    May 10, 2024      CC: \"I am feeling well.\"    HPI:  The patient is 54 y.o. male with a past medical history significant for CAD prior CABG (3/2014), hyperlipidemia, hypertension and PAD. He was previously following with my former partner Dr. Napier. He does follow with Dr Bess for PAD.     Today he presents for follow up and states that overall he is feeling well. He is still having swelling in his left leg.  He is taking the lasix daily.  He reports medication compliance and is tolerating. He denies any abnormal bleeding or bruising. He denies exertional chest pain/pressure, dyspnea at rest, worsening LOYD, PND, orthopnea, palpitations, lightheadedness, weight changes, and syncope.     Review of Systems:  Constitutional: No fatigue, weakness, night sweats or fever.   HEENT: No new vision difficulties or ringing in the ears.  Respiratory: No new SOB, PND, orthopnea or cough.   Cardiovascular: See HPI   GI: No n/v, diarrhea, constipation, abdominal pain or changes in bowel habits.  No melena, no hematochezia  : No urinary frequency, urgency, incontinence, hematuria or dysuria.  Skin: No cyanosis or skin lesions.  Musculoskeletal: No new muscle or joint pain.  Neurological: No syncope or TIA-like symptoms.  Psychiatric: No anxiety, insomnia or depression     Past Medical History:   Diagnosis Date    Acute MI ([facility]) 2010    inferior wall    Buerger's disease ([facility])     CAD (coronary artery disease)     S/P IWMIi 4/10 w/ RCA FIONA X3, normal LVEF. Cath 11/10 nonobstructive LAD dz, RCA stent with moderate instent restenosis. s/p OM fiona X2 12/10    Closed fracture of cervical spine ([facility])     2000; C2-3; fell off ladder    Depression     not on meds    Erythrodermic psoriasis     GERD (gastroesophageal reflux disease)     managed by PCP    Hyperlipidemia     rec semi annual lipids with LDL goal <70    Hypertension     controlled    Ischemic ulcer of 
2+ radial/carotid/dorsalis pedis and posterior tibial bilaterally.  Neurological: He is alert and oriented to person, place, and time. He has normal reflexes. No cranial nerve deficit. Coordination normal.   Skin: Skin is warm and dry. There is no rash or diaphoresis.   Psychiatric: He has a normal mood and affect. His speech is normal and behavior is normal.     EKG Interpretation: 1/5/23 Normal sinus rhythm.   EKG Interpretation: 02/01/2024 Sinus Rhythm   Stress test 12/11/19  Technically a satisfactory study  Normal pharmacological stress portion of the study.  No evidence of Ischemia by Myocardial Perfusion Imaging.  Gated Study shows normal LV size and Systolic function; EF is 70 %.    Carotids 9/30/22   Right Impression  16-49% stenosis of the right ICA based on velocity criteria and B-Mode  imaging. KELSI stenosis measures 32% by image.  Right vertebral artery demonstrates antegrade flow.  Left Impression 16-49% stenosis of the left ICA based on velocity criteria and B-Mode imaging.  LICA stenosis measures 23% by image.  Left vertebral artery demonstrates antegrade flow.     Arterial studies 9/30/22   Right LISA within normal limits. Moderate stenosis appreciated in the right superficial femoral and popliteal arteries.  Left LISA is falsely elevated. Patent left femoral to posterior tibial artery  bypass graft. Abnormally low velocities in the mid graft may be related to  diameter of the graft as opposed to a criteria suggesting threatened graft.    Bilateral Arterial Doppler 5/1/23  Greater than 50% stenosis of the right superficial femoral artery at the  origin (proximal to the stent).  Greater than 50% stenosis of the right mid superficial femoral artery within  the stent.  Greater than 50% stenosis of the right proximal popliteal artery.  Right LISA 1.03  LEFT mid FEM PT bypass open, CFA with elevated melvin. of 305 cm/sec, rest of  bypass looks good. ABI1.3 DP and 1.12 PT    Ted Arterial Dopplers

## 2024-05-17 ENCOUNTER — OFFICE VISIT (OUTPATIENT)
Dept: VASCULAR SURGERY | Age: 54
End: 2024-05-17
Payer: COMMERCIAL

## 2024-05-17 VITALS
WEIGHT: 192 LBS | SYSTOLIC BLOOD PRESSURE: 154 MMHG | DIASTOLIC BLOOD PRESSURE: 80 MMHG | HEART RATE: 74 BPM | BODY MASS INDEX: 26.03 KG/M2

## 2024-05-17 DIAGNOSIS — Z72.0 NICOTINE ABUSE: ICD-10-CM

## 2024-05-17 DIAGNOSIS — R09.89 RIGHT CAROTID BRUIT: ICD-10-CM

## 2024-05-17 DIAGNOSIS — E78.2 MIXED HYPERLIPIDEMIA: ICD-10-CM

## 2024-05-17 DIAGNOSIS — I73.9 PAD (PERIPHERAL ARTERY DISEASE) (HCC): Primary | ICD-10-CM

## 2024-05-17 DIAGNOSIS — I10 PRIMARY HYPERTENSION: ICD-10-CM

## 2024-05-17 DIAGNOSIS — Z98.890 STATUS POST FEMOROTIBIAL BYPASS: ICD-10-CM

## 2024-05-17 PROCEDURE — 99214 OFFICE O/P EST MOD 30 MIN: CPT | Performed by: SURGERY

## 2024-05-17 PROCEDURE — 3077F SYST BP >= 140 MM HG: CPT | Performed by: SURGERY

## 2024-05-17 PROCEDURE — 3079F DIAST BP 80-89 MM HG: CPT | Performed by: SURGERY

## 2024-05-17 ASSESSMENT — ENCOUNTER SYMPTOMS
BACK PAIN: 1
COLOR CHANGE: 1
GASTROINTESTINAL NEGATIVE: 1
RESPIRATORY NEGATIVE: 1

## 2024-05-17 NOTE — PROGRESS NOTES
Daily Progress Note   Bryson Bess MD      5/17/2024    Chief Complaint   Patient presents with    6 Month Follow-Up     BLE ADS, aorta iliac scan          HISTORY OF PRESENT ILLNESS:                The patient is a 54 y.o. male who presents with a six month follow up for a blilateral lower extremity arterial duplex scan and aorta iliac scan.    Tom says he has been okay.  His biggest complaint is hip pain, but he thinks it could be arthritis. Tom had a left leg femoral-tibial bypass done by Dr. Bess in 2020, and had a vein taken from his left arm. He is here again today with his wife who says he is now on a water pill from his cardiologist since his left leg kept swelling.     Tom's scans today show no real significant changes except a decrease in his right LISA. He has an extensive cardiac history with stents as well as a stents in his SFA. He is concerned for his results that show a slight change.     Past Medical History:   Diagnosis Date    Acute MI ([facility]) 2010    inferior wall    Buerger's disease ([facility])     CAD (coronary artery disease)     S/P IWMIi 4/10 w/ RCA FIONA X3, normal LVEF. Cath 11/10 nonobstructive LAD dz, RCA stent with moderate instent restenosis. s/p OM fiona X2 12/10    Closed fracture of cervical spine ([facility])     2000; C2-3; fell off ladder    Depression     not on meds    Erythrodermic psoriasis     GERD (gastroesophageal reflux disease)     managed by PCP    Hyperlipidemia     rec semi annual lipids with LDL goal <70    Hypertension     controlled    Ischemic ulcer of lower leg due to atherosclerotic disease ([facility])     PAD (peripheral artery disease) ([facility])     Aortogram 90% SFA followed by Dr. Schaefer    PAD (peripheral artery disease) ([facility]) 7/12/2011    PONV (postoperative nausea and vomiting)     after CABG    Wears glasses     reading     DATE OF PROCEDURE:  07/27/2018     PREPROCEDURE DIAGNOSES:  1.  Lower extremity atherosclerosis with rest pain of the left foot.  2.  Peripheral

## 2024-06-26 ENCOUNTER — HOSPITAL ENCOUNTER (OUTPATIENT)
Dept: CARDIOLOGY | Age: 54
Discharge: HOME | End: 2024-06-28
Attending: INTERNAL MEDICINE
Payer: COMMERCIAL

## 2024-06-26 VITALS
BODY MASS INDEX: 26.01 KG/M2 | WEIGHT: 192 LBS | HEIGHT: 72 IN | DIASTOLIC BLOOD PRESSURE: 80 MMHG | SYSTOLIC BLOOD PRESSURE: 154 MMHG

## 2024-06-26 DIAGNOSIS — I35.0 NONRHEUMATIC AORTIC VALVE STENOSIS: ICD-10-CM

## 2024-06-26 LAB
ECHO AO ASC DIAM: 3.1 CM
ECHO AO ASCENDING AORTA INDEX: 1.48 CM/M2
ECHO AO ROOT DIAM: 2.6 CM
ECHO AO ROOT INDEX: 1.24 CM/M2
ECHO AV AREA PEAK VELOCITY: 2.4 CM2
ECHO AV AREA VTI: 2.3 CM2
ECHO AV AREA/BSA PEAK VELOCITY: 1.1 CM2/M2
ECHO AV AREA/BSA VTI: 1.1 CM2/M2
ECHO AV CUSP MM: 1.9 CM
ECHO AV MEAN GRADIENT: 3 MMHG
ECHO AV MEAN VELOCITY: 0.8 M/S
ECHO AV PEAK GRADIENT: 5 MMHG
ECHO AV PEAK VELOCITY: 1.1 M/S
ECHO AV VELOCITY RATIO: 0.82
ECHO AV VTI: 23.8 CM
ECHO BSA: 2.1 M2
ECHO EST RA PRESSURE: 3 MMHG
ECHO LA AREA 2C: 17.6 CM2
ECHO LA AREA 4C: 15.5 CM2
ECHO LA MAJOR AXIS: 4.7 CM
ECHO LA MINOR AXIS: 5.7 CM
ECHO LA VOL BP: 48 ML (ref 18–58)
ECHO LA VOL MOD A2C: 44 ML (ref 18–58)
ECHO LA VOL MOD A4C: 42 ML (ref 18–58)
ECHO LA VOL/BSA BIPLANE: 23 ML/M2 (ref 16–34)
ECHO LA VOLUME INDEX MOD A2C: 21 ML/M2 (ref 16–34)
ECHO LA VOLUME INDEX MOD A4C: 20 ML/M2 (ref 16–34)
ECHO LV E' LATERAL VELOCITY: 8 CM/S
ECHO LV E' SEPTAL VELOCITY: 9 CM/S
ECHO LV EDV A2C: 76 ML
ECHO LV EDV A4C: 64 ML
ECHO LV EDV INDEX A4C: 31 ML/M2
ECHO LV EDV NDEX A2C: 36 ML/M2
ECHO LV EJECTION FRACTION A2C: 55 %
ECHO LV EJECTION FRACTION A4C: 48 %
ECHO LV EJECTION FRACTION BIPLANE: 52 % (ref 55–100)
ECHO LV ESV A2C: 34 ML
ECHO LV ESV A4C: 33 ML
ECHO LV ESV INDEX A2C: 16 ML/M2
ECHO LV ESV INDEX A4C: 16 ML/M2
ECHO LV FRACTIONAL SHORTENING: 28 % (ref 28–44)
ECHO LV GLOBAL LONGITUDINAL STRAIN (GLS): -17.9 %
ECHO LV INTERNAL DIMENSION DIASTOLE INDEX: 2.2 CM/M2
ECHO LV INTERNAL DIMENSION DIASTOLIC: 4.6 CM (ref 4.2–5.9)
ECHO LV INTERNAL DIMENSION SYSTOLIC INDEX: 1.58 CM/M2
ECHO LV INTERNAL DIMENSION SYSTOLIC: 3.3 CM
ECHO LV IVSD: 1.2 CM (ref 0.6–1)
ECHO LV MASS 2D: 181.2 G (ref 88–224)
ECHO LV MASS INDEX 2D: 86.7 G/M2 (ref 49–115)
ECHO LV POSTERIOR WALL DIASTOLIC: 1 CM (ref 0.6–1)
ECHO LV RELATIVE WALL THICKNESS RATIO: 0.43
ECHO LVOT AREA: 3.1 CM2
ECHO LVOT AV VTI INDEX: 0.73
ECHO LVOT DIAM: 2 CM
ECHO LVOT MEAN GRADIENT: 2 MMHG
ECHO LVOT PEAK GRADIENT: 3 MMHG
ECHO LVOT PEAK VELOCITY: 0.9 M/S
ECHO LVOT STROKE VOLUME INDEX: 26 ML/M2
ECHO LVOT SV: 54.3 ML
ECHO LVOT VTI: 17.3 CM
ECHO MV A VELOCITY: 0.59 M/S
ECHO MV AREA VTI: 1.7 CM2
ECHO MV E DECELERATION TIME (DT): 121 MS
ECHO MV E VELOCITY: 1.03 M/S
ECHO MV E/A RATIO: 1.75
ECHO MV E/E' LATERAL: 12.88
ECHO MV E/E' RATIO (AVERAGED): 12.16
ECHO MV E/E' SEPTAL: 11.44
ECHO MV LVOT VTI INDEX: 1.89
ECHO MV MAX VELOCITY: 1.1 M/S
ECHO MV MEAN GRADIENT: 3 MMHG
ECHO MV MEAN VELOCITY: 0.8 M/S
ECHO MV PEAK GRADIENT: 5 MMHG
ECHO MV VTI: 32.7 CM
ECHO PV MAX VELOCITY: 1 M/S
ECHO PV MEAN GRADIENT: 2 MMHG
ECHO PV MEAN VELOCITY: 0.6 M/S
ECHO PV PEAK GRADIENT: 4 MMHG
ECHO PV VTI: 21.4 CM
ECHO RA AREA 4C: 11.6 CM2
ECHO RA END SYSTOLIC VOLUME APICAL 4 CHAMBER INDEX BSA: 12 ML/M2
ECHO RA VOLUME: 26 ML
ECHO RIGHT VENTRICULAR SYSTOLIC PRESSURE (RVSP): 30 MMHG
ECHO RV BASAL DIMENSION: 3.3 CM
ECHO RV INTERNAL DIMENSION: 3.4 CM
ECHO RV TAPSE: 2.1 CM (ref 1.7–?)
ECHO TV REGURGITANT MAX VELOCITY: 2.58 M/S
ECHO TV REGURGITANT PEAK GRADIENT: 27 MMHG

## 2024-06-26 PROCEDURE — 93306 TTE W/DOPPLER COMPLETE: CPT

## 2024-07-01 DIAGNOSIS — E78.5 HYPERLIPIDEMIA LDL GOAL <70: Primary | ICD-10-CM

## 2024-07-01 RX ORDER — ROSUVASTATIN CALCIUM 40 MG/1
40 TABLET, COATED ORAL DAILY
Qty: 30 TABLET | Refills: 11 | Status: SHIPPED | OUTPATIENT
Start: 2024-07-01

## 2024-07-01 NOTE — TELEPHONE ENCOUNTER
Alt  and AST ordered since not completed since 1/2023    Called patient to get labs completed and will fax to [facility] to completed     Pt verbalized understanding     Orders placed and script refilled

## 2024-10-03 NOTE — TELEPHONE ENCOUNTER
Medication Refill    Medication needing refilled:    clopidogrel (PLAVIX)     Dosage of the medication:  75 MG tablet     How are you taking this medication (QD, BID, TID, QID, PRN):  TAKE 1 TABLET BY MOUTH EVERY DAY     30 or 90 day supply called in:  90 tablet       Which Pharmacy are we sending the medication to?:    [facility]/pharmacy #[id number] - [place], [place] - [address] [place] [address] [address] - P [phone] - F [phone]  [address] [place] [address] [place] [address]  Phone: [phone]  Fax: [phone]

## 2024-10-04 RX ORDER — CLOPIDOGREL BISULFATE 75 MG/1
75 TABLET ORAL DAILY
Qty: 90 TABLET | Refills: 3 | Status: SHIPPED | OUTPATIENT
Start: 2024-10-04

## 2024-12-12 ENCOUNTER — OFFICE VISIT (OUTPATIENT)
Dept: VASCULAR SURGERY | Age: 54
End: 2024-12-12
Payer: COMMERCIAL

## 2024-12-12 VITALS
SYSTOLIC BLOOD PRESSURE: 138 MMHG | WEIGHT: 194 LBS | BODY MASS INDEX: 26.28 KG/M2 | HEIGHT: 72 IN | DIASTOLIC BLOOD PRESSURE: 80 MMHG

## 2024-12-12 DIAGNOSIS — Z98.890 STATUS POST FEMOROTIBIAL BYPASS: ICD-10-CM

## 2024-12-12 DIAGNOSIS — L40.9 PSORIASIS: ICD-10-CM

## 2024-12-12 DIAGNOSIS — I72.9 ANEURYSM OF VASCULAR GRAFT (HCC): ICD-10-CM

## 2024-12-12 DIAGNOSIS — E78.2 MIXED HYPERLIPIDEMIA: ICD-10-CM

## 2024-12-12 DIAGNOSIS — Z98.62 STATUS POST PERIPHERAL ARTERY ANGIOPLASTY: ICD-10-CM

## 2024-12-12 DIAGNOSIS — T82.898A ANEURYSM OF VASCULAR GRAFT (HCC): ICD-10-CM

## 2024-12-12 DIAGNOSIS — R60.0 BILATERAL LEG EDEMA: ICD-10-CM

## 2024-12-12 DIAGNOSIS — R09.89 RIGHT CAROTID BRUIT: ICD-10-CM

## 2024-12-12 DIAGNOSIS — I74.09 AORTOILIAC OCCLUSIVE DISEASE (HCC): Primary | ICD-10-CM

## 2024-12-12 DIAGNOSIS — Z72.0 NICOTINE ABUSE: ICD-10-CM

## 2024-12-12 DIAGNOSIS — I73.9 PAD (PERIPHERAL ARTERY DISEASE) (HCC): ICD-10-CM

## 2024-12-12 DIAGNOSIS — I10 PRIMARY HYPERTENSION: ICD-10-CM

## 2024-12-12 PROCEDURE — 3075F SYST BP GE 130 - 139MM HG: CPT | Performed by: SURGERY

## 2024-12-12 PROCEDURE — 3079F DIAST BP 80-89 MM HG: CPT | Performed by: SURGERY

## 2024-12-12 PROCEDURE — 99214 OFFICE O/P EST MOD 30 MIN: CPT | Performed by: SURGERY

## 2024-12-12 ASSESSMENT — ENCOUNTER SYMPTOMS
BACK PAIN: 1
COLOR CHANGE: 1
ALLERGIC/IMMUNOLOGIC NEGATIVE: 1
GASTROINTESTINAL NEGATIVE: 1
RESPIRATORY NEGATIVE: 1

## 2024-12-12 NOTE — PATIENT INSTRUCTIONS
Schedule to return in six months for a bilateral lower extremity arterial duplex scan, aorta iliac scan and office visit.       The patient will need to fast for at least 5 hours prior to the aorta iliac ultrasound scan. Please understand that by not doing so may result in having an inaccurate ultrasound and may cause your ultrasound to be rescheduled.

## 2024-12-12 NOTE — PROGRESS NOTES
Daily Progress Note   Bryson Bess MD      12/12/2024    Chief Complaint   Patient presents with    PAD     Arterial duplex and aorta iliac scan and office visit         HISTORY OF PRESENT ILLNESS:                The patient is a 54 y.o. male who presents with a six month follow up for PAD.    Today Tom and his wife both say he is doing about the same, except for his psoriasis on his left leg. He has plaques and swelling of the left leg, as well as hemosiderin. He says he has many aches and pains: his hips, his back, his neck all hurt enough to wake him when he finally goes to sleep. He doesn't walk very far when he does walk, and doesn't walk often. He has concern for his leg getting a wound as he is not sure it would heal.  Has had venous ulceration of the left leg in the past treated with Medih[facility]    Previously, Tom had angioplasties and stents done by Dr. ALIX Butler:    DATE OF PROCEDURE:  07/27/2018     PREPROCEDURE DIAGNOSES:  1.  Lower extremity atherosclerosis with rest pain of the left foot.  2.  Peripheral arterial disease.  3.  Arterial occlusion, left lower extremity.     POSTPROCEDURE DIAGNOSES:  1.  Lower extremity atherosclerosis with rest pain of the left foot.  2.  Peripheral arterial disease.  3.  Arterial occlusion, left lower extremity.     PROCEDURES PERFORMED:  1.  Ultrasound-guided access to the right common femoral artery.  2.  Abdominal aortogram.  3.  Angiogram of the bilateral lower extremities.  4.  Selective angiography of the left femoral artery bifurcation.  5.  Selective angiography of the left posterior tibial artery.  6.  Stent placement, left external iliac artery.  7.  Primary percutaneous mechanical arterial thrombectomy, left superficial  femoral and popliteal arteries.  8.  Laser atherectomy with stent placement, left superficial femoral and  popliteal arteries.  9.  Angioplasty of the left posterior tibial artery.  SURGEON:  Tae Butler MD      He also has an extensive

## 2025-02-04 DIAGNOSIS — I10 ESSENTIAL HYPERTENSION: ICD-10-CM

## 2025-02-04 DIAGNOSIS — E78.5 HYPERLIPIDEMIA LDL GOAL <70: Primary | ICD-10-CM

## 2025-02-04 DIAGNOSIS — I25.10 CORONARY ARTERY DISEASE INVOLVING NATIVE CORONARY ARTERY OF NATIVE HEART WITHOUT ANGINA PECTORIS: ICD-10-CM

## 2025-02-04 DIAGNOSIS — I73.9 PAD (PERIPHERAL ARTERY DISEASE) (HCC): ICD-10-CM

## 2025-02-04 NOTE — TELEPHONE ENCOUNTER
Last OV: 5/9/24  Next OV: 4/8/25  Last refill: 2/1/24 #90 3 R/F  Most recent Labs: 2/24/24  Last EKG (if needed):5/9/24    Called spoke with patient set up appt. States will get labs done. Labs ordered.

## 2025-02-05 RX ORDER — FUROSEMIDE 20 MG/1
20 TABLET ORAL DAILY
Qty: 30 TABLET | Refills: 2 | Status: SHIPPED | OUTPATIENT
Start: 2025-02-05

## 2025-02-28 RX ORDER — FUROSEMIDE 20 MG/1
20 TABLET ORAL DAILY
Qty: 90 TABLET | Refills: 1 | Status: SHIPPED | OUTPATIENT
Start: 2025-02-28

## 2025-04-07 NOTE — PROGRESS NOTES
5/17/2024    Greater than 50% stenosis of the right distal common iliac artery    Greater than 50% stenosis of the left proximal common iliac artery    Greater than 50% stenosis of the left proximal external iliac artery    .    There has been an increase in left proximal EIA velocity compared to the previous study of 11/7/23.     HISTORY  6/14/2011 Left fem-pop bypass graft  10/9/2018 Right EIA stent; right SFA stent with atherectomy; right DANIEL atherectomy w/ angioplasty  2/25/202 Right femoral to PTA bypass w/ reversed cephalic vein    Echo 6/26/2024    Left Ventricle: Normal left ventricular systolic function with a visually estimated EF of 60 - 65%. Left ventricle size is normal. Mildly increased wall thickness. Normal wall motion. Global longitudinal strain is -17.9%. Normal diastolic function.    Right Ventricle: Right ventricle size is normal. Normal systolic function. TAPSE is normal.    Aortic Valve: Valve structure is normal. No regurgitation. No stenosis.    Mitral Valve: Trace regurgitation.    Tricuspid Valve: Mild regurgitation.    Left Atrium: Left atrium size is normal.    Image quality is adequate.    Arterial Duplex lower extremity, bilateral 12/12/2024    RIGHT    Greater than 75% stenosis of the right superficial femoral artery at the origin (proximal to the stent).    Less than 50% stenosis of the right mid SFA within the stent.    Greater than 50% stenosis of the right mid popliteal artery.    Right side findings: Resting LISA is 0.93.    LEFT    Greater than 50% stenosis of the left inflow common femoral artery.    Widely patent left femoral  to posterior tibial artery bypass graft.  Dilatation of mid graft with diameter of 1.15 cm.    Left side findings: Resting LISA is 1.30. Resting LISA is non-compressible (LISA >1.3).    .    There has been a significant increase in right proximal SFA velocities compared to the previous study of 5/17/24.   HISTORY  2/25/2020 Left femoral to PTA bypass w/

## 2025-04-08 ENCOUNTER — OFFICE VISIT (OUTPATIENT)
Dept: CARDIOLOGY CLINIC | Age: 55
End: 2025-04-08

## 2025-04-08 VITALS
BODY MASS INDEX: 26.28 KG/M2 | RESPIRATION RATE: 16 BRPM | HEIGHT: 72 IN | OXYGEN SATURATION: 98 % | WEIGHT: 194 LBS | HEART RATE: 76 BPM | DIASTOLIC BLOOD PRESSURE: 72 MMHG | SYSTOLIC BLOOD PRESSURE: 110 MMHG

## 2025-04-08 DIAGNOSIS — E78.5 HYPERLIPIDEMIA LDL GOAL <70: ICD-10-CM

## 2025-04-08 DIAGNOSIS — Z72.0 TOBACCO ABUSE: ICD-10-CM

## 2025-04-08 DIAGNOSIS — I25.10 CORONARY ARTERY DISEASE INVOLVING NATIVE CORONARY ARTERY OF NATIVE HEART WITHOUT ANGINA PECTORIS: Primary | ICD-10-CM

## 2025-04-08 DIAGNOSIS — I73.9 PAD (PERIPHERAL ARTERY DISEASE): ICD-10-CM

## 2025-04-08 DIAGNOSIS — I10 ESSENTIAL HYPERTENSION: ICD-10-CM

## 2025-04-08 PROCEDURE — 99214 OFFICE O/P EST MOD 30 MIN: CPT | Performed by: INTERNAL MEDICINE

## 2025-04-08 PROCEDURE — 3078F DIAST BP <80 MM HG: CPT | Performed by: INTERNAL MEDICINE

## 2025-04-08 PROCEDURE — 3074F SYST BP LT 130 MM HG: CPT | Performed by: INTERNAL MEDICINE

## 2025-04-08 PROCEDURE — G2211 COMPLEX E/M VISIT ADD ON: HCPCS | Performed by: INTERNAL MEDICINE

## 2025-04-08 RX ORDER — NITROGLYCERIN 0.4 MG/1
0.4 TABLET SUBLINGUAL EVERY 5 MIN PRN
Qty: 25 TABLET | Refills: 3 | Status: SHIPPED | OUTPATIENT
Start: 2025-04-08

## 2025-04-08 RX ORDER — AMLODIPINE BESYLATE 5 MG/1
TABLET ORAL
Qty: 90 TABLET | Refills: 3 | Status: SHIPPED | OUTPATIENT
Start: 2025-04-08

## 2025-04-08 RX ORDER — METOPROLOL TARTRATE 25 MG/1
TABLET, FILM COATED ORAL
Qty: 180 TABLET | Refills: 3 | Status: SHIPPED | OUTPATIENT
Start: 2025-04-08

## 2025-05-03 LAB
CHOLESTEROL, TOTAL: 151 MG/DL (ref 125–200)
HDLC SERPL-MCNC: 69 MG/DL
LDL CHOLESTEROL DIRECT: 50 MG/DL
LDL CHOLESTEROL: 70 MG/DL
NON-HDL CHOLESTEROL: 82 MG/DL (ref 0–129)
TRIGL SERPL-MCNC: 60 MG/DL

## 2025-05-06 ENCOUNTER — RESULTS FOLLOW-UP (OUTPATIENT)
Dept: CARDIOLOGY CLINIC | Age: 55
End: 2025-05-06

## 2025-06-27 ENCOUNTER — TELEPHONE (OUTPATIENT)
Dept: CARDIOLOGY CLINIC | Age: 55
End: 2025-06-27

## 2025-06-27 NOTE — TELEPHONE ENCOUNTER
CARDIA CLEARANCE  Cardiac History:  Last office visit with Cardiologist:  4/8/2  Past medical history significant for CAD prior CABG (3/2014), hyperlipidemia, hypertension and PAD.   Coronary artery disease of native artery without angina  - List previous interventions, S/p Coronary artery bypass grafting x3  Peripheral Artery Disease  -6/14/2011 Left fem-pop bypass graft  -10/9/2018 Right EIA stent; right SFA stent with atherectomy; right DANIEL atherectomy w/ angioplasty  -2/25/202 Right femoral to PTA bypass w/ reversed cephalic vein  2/25/2020 Left femoral to PTA bypass w/ reversed cephalic vein

## 2025-06-27 NOTE — TELEPHONE ENCOUNTER
Dr. Kenney,   Please review and advise on the below preop cardio risk assess and request to hold Plavix therapy? Per his med list. He is also on asiprin 325 mg daily. Recs? Thanks.

## 2025-06-27 NOTE — TELEPHONE ENCOUNTER
CARDIAC CLEARANCE     What type of procedure are you having?  Colonoscopy   Which physician is performing your procedure?  Dr. Danyel Palmer  When is your procedure scheduled?  TBD  Where are you having this procedure?  [facility]  Are you taking Blood Thinners?   If so what? (Name/dose/frequesncy)  Plavix   Does the surgeon want you to stop your blood thinner?  If so for how long?  5 days prior  Are you having any new or worsening cardiac symptoms?   No  Phone Number and Contact Name for Physicians office:  [phone]   Fax number to send information:    Cardiac History:  Last office visit with Cardiologist:  4/8/25  Cardiac Procedures:    Cardiac Testing:    Tom bocanegra CC request form should be faxed over to our office on Monday.

## 2025-06-30 NOTE — TELEPHONE ENCOUNTER
Per Dr. Pablito merino to lower to ASA 81 mg daily.  Please prepare new letter and fax.  Thank you

## 2025-06-30 NOTE — TELEPHONE ENCOUNTER
Indy from Dr. Palmer called in, stated that patient also takes a 325 mg ASA and wants to know if it could be held for 5 days or if pt can be lowered to 81 mg ASA.    Pt does consume alcohol heavily.     Would like updated clearance letter faxed.     Please advise.

## 2025-07-05 DIAGNOSIS — E78.5 HYPERLIPIDEMIA LDL GOAL <70: ICD-10-CM

## 2025-07-07 NOTE — TELEPHONE ENCOUNTER
Last OV: 4/8/25  Next OV: 10/16/25  Last refill: 7/1/24 #30 11 R/F  Most recent Labs: 5/3/25  Last EKG (if needed):

## 2025-07-08 RX ORDER — ROSUVASTATIN CALCIUM 40 MG/1
40 TABLET, COATED ORAL DAILY
Qty: 90 TABLET | Refills: 3 | Status: SHIPPED | OUTPATIENT
Start: 2025-07-08

## 2025-07-11 ENCOUNTER — OFFICE VISIT (OUTPATIENT)
Dept: VASCULAR SURGERY | Age: 55
End: 2025-07-11
Payer: COMMERCIAL

## 2025-07-11 VITALS
DIASTOLIC BLOOD PRESSURE: 90 MMHG | SYSTOLIC BLOOD PRESSURE: 150 MMHG | WEIGHT: 188 LBS | HEIGHT: 72 IN | BODY MASS INDEX: 25.47 KG/M2

## 2025-07-11 DIAGNOSIS — T82.898A ANEURYSM OF VASCULAR GRAFT: Primary | ICD-10-CM

## 2025-07-11 DIAGNOSIS — Z98.62 STATUS POST PERIPHERAL ARTERY ANGIOPLASTY: ICD-10-CM

## 2025-07-11 DIAGNOSIS — I73.9 PAD (PERIPHERAL ARTERY DISEASE): ICD-10-CM

## 2025-07-11 DIAGNOSIS — I72.9 ANEURYSM OF VASCULAR GRAFT: Primary | ICD-10-CM

## 2025-07-11 DIAGNOSIS — R19.5 POSITIVE COLORECTAL CANCER SCREENING USING COLOGUARD TEST: ICD-10-CM

## 2025-07-11 DIAGNOSIS — E78.2 MIXED HYPERLIPIDEMIA: ICD-10-CM

## 2025-07-11 DIAGNOSIS — I10 PRIMARY HYPERTENSION: ICD-10-CM

## 2025-07-11 DIAGNOSIS — T82.398D: ICD-10-CM

## 2025-07-11 DIAGNOSIS — I25.10 CORONARY ARTERY DISEASE INVOLVING NATIVE CORONARY ARTERY OF NATIVE HEART WITHOUT ANGINA PECTORIS: ICD-10-CM

## 2025-07-11 DIAGNOSIS — Z72.0 NICOTINE ABUSE: ICD-10-CM

## 2025-07-11 DIAGNOSIS — R60.0 BILATERAL LEG EDEMA: ICD-10-CM

## 2025-07-11 PROCEDURE — 3080F DIAST BP >= 90 MM HG: CPT | Performed by: SURGERY

## 2025-07-11 PROCEDURE — 3077F SYST BP >= 140 MM HG: CPT | Performed by: SURGERY

## 2025-07-11 PROCEDURE — 99214 OFFICE O/P EST MOD 30 MIN: CPT | Performed by: SURGERY

## 2025-07-11 RX ORDER — TRIAMCINOLONE ACETONIDE 1 MG/G
CREAM TOPICAL
COMMUNITY
Start: 2025-07-02

## 2025-07-11 RX ORDER — CLOBETASOL PROPIONATE 0.5 MG/G
CREAM TOPICAL
COMMUNITY
Start: 2025-06-17

## 2025-07-11 ASSESSMENT — ENCOUNTER SYMPTOMS
COLOR CHANGE: 1
RESPIRATORY NEGATIVE: 1
GASTROINTESTINAL NEGATIVE: 1
ALLERGIC/IMMUNOLOGIC NEGATIVE: 1
BACK PAIN: 1

## 2025-07-11 NOTE — PROGRESS NOTES
Daily Progress Note   Bryson Bess MD      7/11/2025    Chief Complaint   Patient presents with    aortoiliac occlusive disease     Results of scans. Pt says there are no changes.          HISTORY OF PRESENT ILLNESS:                The patient is a 55 y.o. male who presents with a six month follow up for PAD.    Tom says he is okay today.  He says he does not notice any change in distance he is able to walk.  He has seen a dermatologist for his psoriasis of his left leg and his leg appears to be healing. He has been putting on a cream, then wrapping his leg in saran wrap prior to putting on ace wraps. He also says he has pain in his hips and back so it's painful to walk, and he does not sleep in a bed.     Past Medical History:   Diagnosis Date    Acute MI ([facility]) 2010    inferior wall    Buerger's disease     CAD (coronary artery disease)     S/P IWMIi 4/10 w/ RCA FIONA X3, normal LVEF. Cath 11/10 nonobstructive LAD dz, RCA stent with moderate instent restenosis. s/p OM fiona X2 12/10    Closed fracture of cervical spine ([facility])     2000; C2-3; fell off ladder    Depression     not on meds    Erythrodermic psoriasis     GERD (gastroesophageal reflux disease)     managed by PCP    Hyperlipidemia     rec semi annual lipids with LDL goal <70    Hypertension     controlled    Ischemic ulcer of lower leg due to atherosclerotic disease ([facility])     PAD (peripheral artery disease)     Aortogram 90% SFA followed by Dr. Schaefer    PAD (peripheral artery disease) 7/12/2011    PONV (postoperative nausea and vomiting)     after CABG    Wears glasses     reading       Past Surgical History:   Procedure Laterality Date    CARDIAC CATHETERIZATION      multiple    CARDIAC SURGERY      CABG--2 vessels    CORONARY ANGIOPLASTY      multiple cardiac stents    FEMORAL-TIBIAL BYPASS GRAFT Left 02/05/2020    LEFT FEMORAL TO POSTERIOR TIBIAL BYPASS WITH REVERSED LEFT ARM CEPHALIC VEIN performed by Bryson Bess MD at [facility] OR    LEG SURGERY

## 2025-07-11 NOTE — PATIENT INSTRUCTIONS
Dr. Bess will speak with Dr. John Ny about an angiogram and then you will hear from someone about the next steps

## 2025-08-07 ENCOUNTER — TELEPHONE (OUTPATIENT)
Dept: VASCULAR SURGERY | Age: 55
End: 2025-08-07

## 2025-08-21 RX ORDER — FUROSEMIDE 20 MG/1
20 TABLET ORAL DAILY
Qty: 30 TABLET | Refills: 3 | Status: SHIPPED | OUTPATIENT
Start: 2025-08-21

## (undated) DEVICE — SUTURE BOOT: Brand: DEROYAL

## (undated) DEVICE — 5F 40 CM NOVASIL SILICONE SINGLE LUMEN EMBOLECTOMY CATHETER: Brand: LEMAITRE EMBOLECTOMY CATHETER

## (undated) DEVICE — AGENT HEMSTAT W4XL4IN OXIDIZED REGENERATED CELOS ABSRB SFT

## (undated) DEVICE — ELECTROSURGICAL PENCIL BUTTON SWITCH NON COATED BLADE ELECTRODE 10 FT (3 M) CORD HOLSTER: Brand: MEGADYNE

## (undated) DEVICE — CATHETER IV 18 GAX125 IN WNG INTROCAN SAFETY

## (undated) DEVICE — DUAL HOSE W/CPC CONNECTORS: Brand: A.T.S.® TOURNIQUET SYSTEM

## (undated) DEVICE — CHLORAPREP 26ML ORANGE

## (undated) DEVICE — SYSTEM SKIN CLSR 60CM 2-OCTYL CYNOACRLT W/ MESH DISPNS

## (undated) DEVICE — SYSTEM SKIN CLSR 22CM DERMBND PRINEO

## (undated) DEVICE — MAJOR SET UP PK

## (undated) DEVICE — COVER LT HNDL BLU PLAS

## (undated) DEVICE — CLIP LIG M TI 6 SIL HNDL FOR OPN AND ENDOSCP SGL APPL

## (undated) DEVICE — TOTAL TRAY, 16FR 10ML SIL FOLEY, URN: Brand: MEDLINE

## (undated) DEVICE — LOOP VES W25MM THK1MM MAXI RED SIL FLD REPELLENT 100 PER

## (undated) DEVICE — SUTURE PERMAHAND SZ 4-0 L12X30IN NONABSORBABLE BLK SILK A303H

## (undated) DEVICE — PICO 7 10CM X 20CM: Brand: PICO™ 7

## (undated) DEVICE — ELECTRODE PT RET AD L9FT HI MOIST COND ADH HYDRGEL CORDED

## (undated) DEVICE — INTENDED FOR TISSUE SEPARATION, AND OTHER PROCEDURES THAT REQUIRE A SHARP SURGICAL BLADE TO PUNCTURE OR CUT.: Brand: BARD-PARKER ® STAINLESS STEEL BLADES

## (undated) DEVICE — SHEET,DRAPE,53X77,STERILE: Brand: MEDLINE

## (undated) DEVICE — 3M™ STERI-DRAPE™ INSTRUMENT POUCH 1018: Brand: STERI-DRAPE™

## (undated) DEVICE — SUTURE PERMAHAND SZ 3-0 L18IN NONABSORBABLE BLK L26MM SH C013D

## (undated) DEVICE — MERCY HEALTH WEST TURNOVER: Brand: MEDLINE INDUSTRIES, INC.

## (undated) DEVICE — GAUZE,SPONGE,4"X4",16PLY,XRAY,STRL,LF: Brand: MEDLINE

## (undated) DEVICE — BANDAGE COMPR W6INXL12FT SMOOTH FOR LIMB EXSANG ESMARCH

## (undated) DEVICE — SUTURE VCRL SZ 4-0 L18IN ABSRB UD L19MM PS-2 3/8 CIR PRIM J496H

## (undated) DEVICE — DRAPE C ARM UNIV W41XL74IN CLR PLAS XR VELC CLSR POLY STRP

## (undated) DEVICE — SUTURE PROL SZ 6-0 L24IN NONABSORBABLE BLU L9.3MM BV-1 3/8 8805H

## (undated) DEVICE — SUTURE PROL SZ 7-0 L18IN NONABSORBABLE BLU L9.3MM BV-1 3/8 8701H

## (undated) DEVICE — 6F 80 CM NOVASIL SILICONE SINGLE LUMEN EMBOLECTOMY CATHETER: Brand: LEMAITRE EMBOLECTOMY CATHETER

## (undated) DEVICE — 3M™ IOBAN™ 2 ANTIMICROBIAL INCISE DRAPE 6650EZ: Brand: IOBAN™ 2

## (undated) DEVICE — CLIP LIG SM TI 6 BLU HNDL FOR OPN AND ENDOSCP SGL APPL

## (undated) DEVICE — CANISTER, RIGID, 1200CC: Brand: MEDLINE INDUSTRIES, INC.

## (undated) DEVICE — TOWEL,OR,DSP,ST,WHITE,DLX,XR,4/PK,20PK/C: Brand: MEDLINE

## (undated) DEVICE — SMALL (GREEN) FOR GRAFTS UP TO 8 MM, 20 1/2" / 52 CM (1/PKG): Brand: SCANLAN® VASCULAR TUNNELER SHEATHS AND BULLET TIPS

## (undated) DEVICE — SYRINGE MED 30ML STD CLR PLAS LUERLOCK TIP N CTRL DISP

## (undated) DEVICE — TOWEL,OR,DSP,ST,BLUE,STD,4/PK,20PK/CS: Brand: MEDLINE

## (undated) DEVICE — DECANTER BAG 9": Brand: MEDLINE INDUSTRIES, INC.

## (undated) DEVICE — SYRINGE TB 1ML NDL 27GA L0.5IN PLAS W/ SFTY LOK PERM NDL

## (undated) DEVICE — SUTURE VCRL SZ 3-0 L27IN ABSRB UD L26MM SH 1/2 CIR J416H

## (undated) DEVICE — COTTON UNDERCAST PADDING,CRIMPED FINISH: Brand: WEBRIL

## (undated) DEVICE — LOOP VES W1.3MM THK0.9MM MINI WHT SIL FLD REPELLENT

## (undated) DEVICE — SYRINGE 20ML LL S/C 50

## (undated) DEVICE — SUTURE NONABSORBABLE MONOFILAMENT 5-0 C-1 1X24 IN PROLENE 8725H

## (undated) DEVICE — GOWN SIRUS NONREIN XL W/TWL: Brand: MEDLINE INDUSTRIES, INC.

## (undated) DEVICE — GLOVE SURG SZ 8 CRM LTX FREE POLYISOPRENE POLYMER BEAD ANTI

## (undated) DEVICE — SOLUTION IV 1000ML 0.9% SOD CHL PH 5 INJ USP VIAFLX PLAS

## (undated) DEVICE — SYRINGE MED 1ML POLYCARB LUERLOCK HUB

## (undated) DEVICE — ZIMMER® STERILE DISPOSABLE TOURNIQUET CUFF WITH PLC, DUAL PORT, SINGLE BLADDER, 24 IN. (61 CM)

## (undated) DEVICE — SUTURE ABSORBABLE BRAIDED 2-0 CT-1 27 IN UD VICRYL J259H

## (undated) DEVICE — Z DUP USE 2701075 SYSTEM SKIN CLSR 42CM DERMBND PRINEO

## (undated) DEVICE — SUTURE PERMAHAND SZ 2-0 L30IN NONABSORBABLE BLK SILK W/O A305H

## (undated) DEVICE — Device: Brand: MEDEX

## (undated) DEVICE — SHEET, T, LAPAROTOMY, STERILE: Brand: MEDLINE

## (undated) DEVICE — SUTURE VCRL SZ 3-0 L27IN ABSRB UD L36MM CT-1 1/2 CIR J258H